# Patient Record
Sex: FEMALE | Race: BLACK OR AFRICAN AMERICAN | NOT HISPANIC OR LATINO | Employment: OTHER | URBAN - METROPOLITAN AREA
[De-identification: names, ages, dates, MRNs, and addresses within clinical notes are randomized per-mention and may not be internally consistent; named-entity substitution may affect disease eponyms.]

---

## 2017-06-12 ENCOUNTER — ALLSCRIPTS OFFICE VISIT (OUTPATIENT)
Dept: OTHER | Facility: OTHER | Age: 62
End: 2017-06-12

## 2017-08-21 ENCOUNTER — GENERIC CONVERSION - ENCOUNTER (OUTPATIENT)
Dept: OTHER | Facility: OTHER | Age: 62
End: 2017-08-21

## 2018-06-18 ENCOUNTER — OFFICE VISIT (OUTPATIENT)
Dept: DERMATOLOGY | Facility: CLINIC | Age: 63
End: 2018-06-18
Payer: COMMERCIAL

## 2018-06-18 DIAGNOSIS — L20.84 INTRINSIC ATOPIC DERMATITIS: Primary | ICD-10-CM

## 2018-06-18 DIAGNOSIS — Z13.89 SCREENING FOR SKIN CONDITION: ICD-10-CM

## 2018-06-18 PROCEDURE — 99213 OFFICE O/P EST LOW 20 MIN: CPT | Performed by: DERMATOLOGY

## 2018-06-18 RX ORDER — CLOBETASOL PROPIONATE 0.5 MG/G
OINTMENT TOPICAL
COMMUNITY
Start: 2015-06-03 | End: 2018-06-18 | Stop reason: SDUPTHER

## 2018-06-18 RX ORDER — CLOBETASOL PROPIONATE 0.5 MG/G
OINTMENT TOPICAL 2 TIMES DAILY
Qty: 30 G | Refills: 2 | Status: SHIPPED | OUTPATIENT
Start: 2018-06-18 | End: 2019-01-27

## 2018-06-18 NOTE — PROGRESS NOTES
[address] [facility] [address] [facility] DERMATOLOGY  [address] [address]     MRN: [id number] : 1955  Encounter: [id number]  Patient Information: Rubbie Craze  Chief complaint:Yearly checkup    History of present illness:  16-year-old female with history of eczema presents for overall checkup concerned regarding some itchy spots on her legs also between her feet she has been using clobetasol I had given her previously  No past medical history on file  No past surgical history on file  Social History   History   Alcohol use Not on file     History   Drug use: Unknown     History   Smoking Status    Not on file   Smokeless Tobacco    Not on file     No family history on file  Meds/Allergies   No Known Allergies    Meds:  Prior to Admission medications    Medication Sig Start Date End Date Taking?  Authorizing Provider   clobetasol (TEMOVATE) 0 05 % ointment Apply topically 6/3/15  Yes Historical Provider, MD   glucose blood test strip TEST 1-2 TIMES DAILY 17  Yes Historical Provider, MD   ONE TOUCH ULTRA TEST test strip TEST 1-2 TIMES DAILY 18  Yes Historical Provider, MD       Subjective:     Review of Systems:    General: negative for - chills, fatigue, fever,  weight gain or weight loss  Psychological: negative for - anxiety, behavioral disorder, concentration difficulties, decreased libido, depression, irritability, memory difficulties, mood swings, sleep disturbances or suicidal ideation  ENT: negative for - hearing difficulties , nasal congestion, nasal discharge, oral lesions, sinus pain, sneezing, sore throat  Allergy and Immunology: negative for - hives, insect bite sensitivity,  Hematological and Lymphatic: negative for - bleeding problems, blood clots,bruising, swollen lymph nodes  Endocrine: negative for - hair pattern changes, hot flashes, malaise/lethargy, mood swings, palpitations, polydipsia/polyuria, skin changes, temperature intolerance or unexpected weight change  Respiratory: negative for - cough, hemoptysis, orthopnea, shortness of breath, or wheezing  Cardiovascular: negative for - chest pain, dyspnea on exertion, edema,  Gastrointestinal: negative for - abdominal pain, nausea/vomiting  Genito-Urinary: negative for - dysuria, incontinence, irregular/heavy menses or urinary frequency/urgency  Musculoskeletal: negative for - gait disturbance, joint pain, joint stiffness, joint swelling, muscle pain, muscular weakness  Dermatological:  As in HPI  Neurological: negative for confusion, dizziness, headaches, impaired coordination/balance, memory loss, numbness/tingling, seizures, speech problems, tremors or weakness       Objective: There were no vitals taken for this visit  Physical Exam:    General Appearance:    Alert, cooperative, no distress   Head:    Normocephalic, without obvious abnormality, atraumatic           Skin:   A full skin exam was performed including scalp, head scalp, eyes, ears, nose, lips, neck, chest, axilla, abdomen, back, buttocks, bilateral upper extremities, bilateral lower extremities, hands, feet, fingers, toes, fingernails, and toenails  Mild hyperpigmentation noted between the toes of the 4th and 5th web  Postinflammatory hyperpigmentation noted on the lower legs no signs of any active rash at this time     Assessment:     1  Intrinsic atopic dermatitis     2   Screening for skin condition           Plan:    eczema again slightly active continue with the topical steroid as needed nothing else of concern noted on complete exam follow-up in a year or so as necessary    Lady Mae MD  6/18/2018,1:17 PM    Portions of the record may have been created with voice recognition software   Occasional wrong word or "sound a like" substitutions may have occurred due to the inherent limitations of voice recognition software   Read the chart carefully and recognize, using context, where substitutions have occurred

## 2018-06-18 NOTE — PATIENT INSTRUCTIONS
eczema again slightly active continue with the topical steroid as needed nothing else of concern noted on complete exam follow-up in a year or so as necessary

## 2019-01-27 ENCOUNTER — APPOINTMENT (EMERGENCY)
Dept: RADIOLOGY | Facility: HOSPITAL | Age: 64
End: 2019-01-27
Payer: COMMERCIAL

## 2019-01-27 ENCOUNTER — HOSPITAL ENCOUNTER (EMERGENCY)
Facility: HOSPITAL | Age: 64
Discharge: HOME | End: 2019-01-27
Attending: EMERGENCY MEDICINE | Admitting: EMERGENCY MEDICINE
Payer: COMMERCIAL

## 2019-01-27 ENCOUNTER — APPOINTMENT (EMERGENCY)
Dept: ULTRASOUND IMAGING | Facility: HOSPITAL | Age: 64
End: 2019-01-27
Payer: COMMERCIAL

## 2019-01-27 VITALS
WEIGHT: 152.12 LBS | RESPIRATION RATE: 18 BRPM | SYSTOLIC BLOOD PRESSURE: 122 MMHG | OXYGEN SATURATION: 98 % | TEMPERATURE: 99 F | HEART RATE: 64 BPM | DIASTOLIC BLOOD PRESSURE: 66 MMHG

## 2019-01-27 DIAGNOSIS — M70.40 PREPATELLAR BURSITIS: Primary | ICD-10-CM

## 2019-01-27 LAB
ANION GAP SERPL CALCULATED.3IONS-SCNC: 13 MMOL/L (ref 4–13)
APTT PPP: 29 SECONDS (ref 26–38)
BASOPHILS # BLD AUTO: 0.04 THOUSANDS/ΜL (ref 0–0.1)
BASOPHILS NFR BLD AUTO: 1 % (ref 0–1)
BUN SERPL-MCNC: 15 MG/DL (ref 5–25)
CALCIUM SERPL-MCNC: 9.2 MG/DL (ref 8.3–10.1)
CHLORIDE SERPL-SCNC: 103 MMOL/L (ref 100–108)
CO2 SERPL-SCNC: 25 MMOL/L (ref 21–32)
CREAT SERPL-MCNC: 0.97 MG/DL (ref 0.6–1.3)
EOSINOPHIL # BLD AUTO: 0.1 THOUSAND/ΜL (ref 0–0.61)
EOSINOPHIL NFR BLD AUTO: 2 % (ref 0–6)
ERYTHROCYTE [DISTWIDTH] IN BLOOD BY AUTOMATED COUNT: 13.5 % (ref 11.6–15.1)
GFR SERPL CREATININE-BSD FRML MDRD: 73 ML/MIN/1.73SQ M
GLUCOSE SERPL-MCNC: 119 MG/DL (ref 65–140)
HCT VFR BLD AUTO: 41.6 % (ref 34.8–46.1)
HGB BLD-MCNC: 14.1 G/DL (ref 11.5–15.4)
IMM GRANULOCYTES # BLD AUTO: 0.01 THOUSAND/UL (ref 0–0.2)
IMM GRANULOCYTES NFR BLD AUTO: 0 % (ref 0–2)
INR PPP: 0.94 (ref 0.86–1.17)
LYMPHOCYTES # BLD AUTO: 1.97 THOUSANDS/ΜL (ref 0.6–4.47)
LYMPHOCYTES NFR BLD AUTO: 29 % (ref 14–44)
MCH RBC QN AUTO: 27.7 PG (ref 26.8–34.3)
MCHC RBC AUTO-ENTMCNC: 33.9 G/DL (ref 31.4–37.4)
MCV RBC AUTO: 82 FL (ref 82–98)
MONOCYTES # BLD AUTO: 0.48 THOUSAND/ΜL (ref 0.17–1.22)
MONOCYTES NFR BLD AUTO: 7 % (ref 4–12)
NEUTROPHILS # BLD AUTO: 4.13 THOUSANDS/ΜL (ref 1.85–7.62)
NEUTS SEG NFR BLD AUTO: 61 % (ref 43–75)
NRBC BLD AUTO-RTO: 0 /100 WBCS
PLATELET # BLD AUTO: 277 THOUSANDS/UL (ref 149–390)
PMV BLD AUTO: 9.5 FL (ref 8.9–12.7)
POTASSIUM SERPL-SCNC: 3.7 MMOL/L (ref 3.5–5.3)
PROTHROMBIN TIME: 12.5 SECONDS (ref 11.8–14.2)
RBC # BLD AUTO: 5.09 MILLION/UL (ref 3.81–5.12)
SODIUM SERPL-SCNC: 141 MMOL/L (ref 136–145)
URATE SERPL-MCNC: 5.5 MG/DL (ref 2–6.8)
WBC # BLD AUTO: 6.73 THOUSAND/UL (ref 4.31–10.16)

## 2019-01-27 PROCEDURE — 80048 BASIC METABOLIC PNL TOTAL CA: CPT | Performed by: PHYSICIAN ASSISTANT

## 2019-01-27 PROCEDURE — 85610 PROTHROMBIN TIME: CPT | Performed by: PHYSICIAN ASSISTANT

## 2019-01-27 PROCEDURE — 73562 X-RAY EXAM OF KNEE 3: CPT

## 2019-01-27 PROCEDURE — 36415 COLL VENOUS BLD VENIPUNCTURE: CPT | Performed by: PHYSICIAN ASSISTANT

## 2019-01-27 PROCEDURE — 99284 EMERGENCY DEPT VISIT MOD MDM: CPT

## 2019-01-27 PROCEDURE — 85730 THROMBOPLASTIN TIME PARTIAL: CPT | Performed by: PHYSICIAN ASSISTANT

## 2019-01-27 PROCEDURE — 84550 ASSAY OF BLOOD/URIC ACID: CPT | Performed by: PHYSICIAN ASSISTANT

## 2019-01-27 PROCEDURE — 85025 COMPLETE CBC W/AUTO DIFF WBC: CPT | Performed by: PHYSICIAN ASSISTANT

## 2019-01-27 PROCEDURE — 93971 EXTREMITY STUDY: CPT | Performed by: SURGERY

## 2019-01-27 PROCEDURE — 93971 EXTREMITY STUDY: CPT

## 2019-01-27 RX ORDER — NAPROXEN 500 MG/1
500 TABLET ORAL 2 TIMES DAILY PRN
Qty: 30 TABLET | Refills: 0 | Status: SHIPPED | OUTPATIENT
Start: 2019-01-27 | End: 2021-03-30 | Stop reason: ALTCHOICE

## 2019-01-27 RX ORDER — TRAMADOL HYDROCHLORIDE 50 MG/1
50 TABLET ORAL EVERY 6 HOURS PRN
Qty: 15 TABLET | Refills: 0 | Status: SHIPPED | OUTPATIENT
Start: 2019-01-27 | End: 2019-02-06

## 2019-01-27 RX ORDER — CLINDAMYCIN HYDROCHLORIDE 300 MG/1
300 CAPSULE ORAL 3 TIMES DAILY
Qty: 21 CAPSULE | Refills: 0 | Status: SHIPPED | OUTPATIENT
Start: 2019-01-27 | End: 2019-02-03

## 2019-01-27 RX ORDER — ACETAMINOPHEN 325 MG/1
650 TABLET ORAL ONCE
Status: COMPLETED | OUTPATIENT
Start: 2019-01-27 | End: 2019-01-27

## 2019-01-27 RX ADMIN — ACETAMINOPHEN 650 MG: 325 TABLET, FILM COATED ORAL at 07:12
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
## 2019-01-27 NOTE — DISCHARGE INSTRUCTIONS
Knee Bursitis   WHAT YOU NEED TO KNOW:   Knee bursitis is inflammation of the bursa in your knee  The bursa is a fluid-filled sac that acts as a cushion between a bone and a tendon  A tendon is a cord of strong tissue that connects muscles to bones  DISCHARGE INSTRUCTIONS:   Medicines:   · NSAIDs:  These medicines decrease swelling, pain, and fever  NSAIDs are available without a doctor's order  Ask your healthcare provider which medicine is right for you  Ask how much to take and when to take it  Take as directed  NSAIDs can cause stomach bleeding and kidney problems if not taken correctly  · Antibiotics: These help fight an infection caused by bacteria  You may need antibiotics if your bursitis is caused by infection  · Take your medicine as directed  Contact your healthcare provider if you think your medicine is not helping or if you have side effects  Tell him of her if you are allergic to any medicine  Keep a list of the medicines, vitamins, and herbs you take  Include the amounts, and when and why you take them  Bring the list or the pill bottles to follow-up visits  Carry your medicine list with you in case of an emergency  Manage your symptoms:   · Rest:  Rest your knee as much as possible to decrease pain and swelling  Slowly start to do more each day  Return to your daily activities as directed  · Ice:  Ice helps decrease swelling and pain  Ice may also help prevent tissue damage  Use an ice pack, or put crushed ice in a plastic bag  Cover it with a towel and place it on your knee for 15 to 20 minutes, 3 to 4 times each day, as directed  · Heat:  Heat helps decrease pain and stiffness  Apply heat on the area for 15 to 20 minutes, 3 to 4 times each day, as directed  · Compression:  Healthcare providers may wrap your knee with tape or an elastic bandage to decrease swelling  Loosen the elastic bandage if you start to lose feeling in your toes      · Elevation:  Raise your knee above the level of your heart as often as you can  This will help decrease swelling and pain  Prop your knee on pillows or blankets to keep it elevated comfortably  Physical therapy:  A physical therapist teaches you exercises to help improve movement and strength, and to decrease pain  Prevent another knee injury:   · Stretch, warm up, and cool down:  Always stretch and do warmup and cool-down exercises before and after you exercise  This will help loosen your muscles and decrease stress on your knees  Rest between workouts  · Protect your knees:  Use kneepads when you kneel on a hard surface and when you play sports  Stand and walk around every 20 minutes if you have to kneel for a long period of time  Follow up with your healthcare provider as directed:  Write down your questions so you remember to ask them during your visits  Contact your healthcare provider if:   · Your pain and swelling increase  · Your symptoms do not improve with treatment  · You have a fever  · You have questions or concerns about your condition or care  © 2017 [facility] Information is for End User's use only and may not be sold, redistributed or otherwise used for commercial purposes  All illustrations and images included in CareNotes® are the copyrighted property of A D [employer]  or [employer]  The above information is an  only  It is not intended as medical advice for individual conditions or treatments  Talk to your doctor, nurse or pharmacist before following any medical regimen to see if it is safe and effective for you  Knee Immobilizer   WHAT YOU NEED TO KNOW:   A knee immobilizer limits knee movement  It is used after an injury or surgery to help your knee, muscles, or tendons heal    DISCHARGE INSTRUCTIONS:   How to safely use a knee immobilizer:   · Have your knee immobilizer fitted by your healthcare provider    It is important that your knee immobilizer is the right size for you and that it fits properly  · Wear your knee immobilizer as directed  It can be worn over your clothing  Check the fit of the knee immobilizer often  If it does not fit properly or slips out of place, it could cause further injury  · Use crutches as directed  You may need to avoid putting weight on your injured leg  Your healthcare provider will tell you if you need crutches and for how long  · Inspect your knee immobilizer often  Do not wear your knee immobilizer if it is damaged or broken  You may need to replace it if it becomes worn  · Ask your healthcare provider how to care for your knee immobilizer  You may be able to hand wash the fabric with mild soap and water  Do not place it in the washer or dryer  · Go to physical therapy as directed  A physical therapist can help you strengthen the muscles in your leg and help your knee heal   Contact your healthcare provider if:   · Your knee pain becomes worse when you wear your knee immobilizer  · Your skin is sore or raw after you wear your knee immobilizer  · Your leg feels numb or swells while you wear your knee immobilizer  · Your knee immobilizer is damaged  · You have questions or concerns about your condition or care  Return to the emergency department if:   · You have severe swelling or pain in your leg or knee  © 2017 [facility] Information is for End User's use only and may not be sold, redistributed or otherwise used for commercial purposes  All illustrations and images included in CareNotes® are the copyrighted property of [employer]  or [employer]  The above information is an  only  It is not intended as medical advice for individual conditions or treatments  Talk to your doctor, nurse or pharmacist before following any medical regimen to see if it is safe and effective for you        RICE Therapy   WHAT YOU NEED TO KNOW:   RICE therapy is a 4-step process used to reduce swelling and pain from an injury  RICE stands for rest, ice, compression, and elevation  RICE should be done within the first 24 to 48 hours after an injury  DISCHARGE INSTRUCTIONS:   Follow up with your healthcare provider as directed:  Write down your questions so you remember to ask them during your visits  How to use RICE therapy:   · Rest  the injured area so that it can heal  You may need to avoid putting any weight on your injury for at least 48 hours  Return to normal activities as directed  · Ice  the injury for 20 minutes every 4 hours, or as directed  Use an ice pack, or put crushed ice in a plastic bag  Cover it with a towel to protect your skin  Ice helps prevent tissue damage and decreases swelling and pain  · Compress  the injury with an elastic bandage, air cast, special boot, or splint to reduce swelling  Ask your healthcare provider which compression device to use, and how tight it should be  · Elevate  the injured area above the level of your heart as often as you can  This will help decrease swelling and pain  If possible, prop the injured area on pillows or blankets to keep it elevated comfortably  Contact your healthcare provider if:   · Your pain does not decrease, even after treatment  · You have questions or concerns about your condition or care  Return to the emergency department if:   · You have severe pain in the injured area  · You have numbness in the injured area  · You cannot put any weight on or move the injured area  © 2017 [address] [employer]  Information is for End User's use only and may not be sold, redistributed or otherwise used for commercial purposes  All illustrations and images included in CareNotes® are the copyrighted property of [employer]  or [employer]  The above information is an  only  It is not intended as medical advice for individual conditions or treatments   Talk to your doctor, nurse or pharmacist before following any medical regimen to see if it is safe and effective for you

## 2019-06-24 ENCOUNTER — OFFICE VISIT (OUTPATIENT)
Dept: DERMATOLOGY | Facility: CLINIC | Age: 64
End: 2019-06-24
Payer: COMMERCIAL

## 2019-06-24 DIAGNOSIS — Z13.89 SCREENING FOR SKIN CONDITION: ICD-10-CM

## 2019-06-24 DIAGNOSIS — L20.84 INTRINSIC ATOPIC DERMATITIS: Primary | ICD-10-CM

## 2019-06-24 PROCEDURE — 99213 OFFICE O/P EST LOW 20 MIN: CPT | Performed by: DERMATOLOGY

## 2019-06-24 RX ORDER — GABAPENTIN 100 MG/1
100 CAPSULE ORAL AS NEEDED
COMMUNITY

## 2019-06-24 RX ORDER — BETAMETHASONE DIPROPIONATE 0.5 MG/G
OINTMENT TOPICAL 2 TIMES DAILY
Qty: 15 G | Refills: 2 | Status: SHIPPED | OUTPATIENT
Start: 2019-06-24 | End: 2021-03-30 | Stop reason: ALTCHOICE

## 2020-06-26 ENCOUNTER — TELEPHONE (OUTPATIENT)
Dept: DERMATOLOGY | Facility: CLINIC | Age: 65
End: 2020-06-26

## 2020-06-29 ENCOUNTER — OFFICE VISIT (OUTPATIENT)
Dept: DERMATOLOGY | Facility: CLINIC | Age: 65
End: 2020-06-29
Payer: COMMERCIAL

## 2020-06-29 VITALS — DIASTOLIC BLOOD PRESSURE: 70 MMHG | SYSTOLIC BLOOD PRESSURE: 122 MMHG | TEMPERATURE: 98 F

## 2020-06-29 DIAGNOSIS — L28.0 LICHEN SIMPLEX CHRONICUS: Primary | ICD-10-CM

## 2020-06-29 DIAGNOSIS — L65.9 ALOPECIA: ICD-10-CM

## 2020-06-29 PROCEDURE — 99213 OFFICE O/P EST LOW 20 MIN: CPT | Performed by: DERMATOLOGY

## 2020-06-29 RX ORDER — CLOBETASOL PROPIONATE 0.5 MG/G
OINTMENT TOPICAL
COMMUNITY
End: 2020-06-29 | Stop reason: SDUPTHER

## 2020-06-29 RX ORDER — CLOBETASOL PROPIONATE 0.5 MG/G
OINTMENT TOPICAL 2 TIMES DAILY
Qty: 30 G | Refills: 1 | Status: SHIPPED | OUTPATIENT
Start: 2020-06-29 | End: 2021-03-30 | Stop reason: ALTCHOICE

## 2020-09-05 ENCOUNTER — APPOINTMENT (OUTPATIENT)
Dept: RADIOLOGY | Facility: CLINIC | Age: 65
End: 2020-09-05
Payer: COMMERCIAL

## 2020-09-05 ENCOUNTER — OFFICE VISIT (OUTPATIENT)
Dept: URGENT CARE | Facility: CLINIC | Age: 65
End: 2020-09-05
Payer: COMMERCIAL

## 2020-09-05 VITALS
OXYGEN SATURATION: 100 % | DIASTOLIC BLOOD PRESSURE: 88 MMHG | HEIGHT: 67 IN | SYSTOLIC BLOOD PRESSURE: 138 MMHG | RESPIRATION RATE: 18 BRPM | BODY MASS INDEX: 23.23 KG/M2 | TEMPERATURE: 97.8 F | HEART RATE: 80 BPM | WEIGHT: 148 LBS

## 2020-09-05 DIAGNOSIS — M75.32 CALCIFIC TENDINITIS OF LEFT SHOULDER: Primary | ICD-10-CM

## 2020-09-05 DIAGNOSIS — M25.512 ACUTE PAIN OF LEFT SHOULDER: ICD-10-CM

## 2020-09-05 PROCEDURE — 99213 OFFICE O/P EST LOW 20 MIN: CPT | Performed by: PHYSICIAN ASSISTANT

## 2020-09-05 PROCEDURE — 73030 X-RAY EXAM OF SHOULDER: CPT

## 2020-09-05 RX ORDER — ACETAMINOPHEN AND CODEINE PHOSPHATE 300; 30 MG/1; MG/1
1 TABLET ORAL EVERY 6 HOURS PRN
Qty: 12 TABLET | Refills: 0 | Status: SHIPPED | OUTPATIENT
Start: 2020-09-05 | End: 2021-03-30 | Stop reason: ALTCHOICE

## 2020-09-05 RX ORDER — PREDNISONE 10 MG/1
TABLET ORAL
Qty: 18 TABLET | Refills: 0 | Status: SHIPPED | OUTPATIENT
Start: 2020-09-05 | End: 2021-03-30 | Stop reason: ALTCHOICE

## 2020-09-05 NOTE — PROGRESS NOTES
[facility]        NAME: Ramila Horowitz is a 63 y o  female  : 1955    MRN: [id number]  DATE: 2020  TIME: 3:12 PM    Assessment and Plan   Calcific tendinitis of left shoulder [M75 32]  1  Calcific tendinitis of left shoulder  XR shoulder 2+ vw left    predniSONE 10 mg tablet    Ambulatory referral to Orthopedic Surgery    acetaminophen-codeine (TYLENOL #3) 300-30 mg per tablet         Patient Instructions   Patient Instructions     X-ray shows calcific tendinitis  We will start her on steroid taper  Monitor sugars of on the steroid  Will benefit from orthopedics evaluation  Follow up with PCP in 3-5 days  Proceed to  ER if symptoms worsen  Chief Complaint     Chief Complaint   Patient presents with    Shoulder Pain     Pt c/o left shoulder pain x 4 days, pt states she woke up with it on Tuesday no injury  History of Present Illness         31-year-old female complains of left shoulder pain starting   Tuesday  She slept on it  It has gotten worse  She woke up with it  No fall or trauma  Very painful now she cannot move the arm in all without pain  No numbness or tingling in the arm  No treatment at home  Review of Systems   Review of Systems   Constitutional: Negative for chills, fatigue and fever  HENT: Negative for congestion, ear pain, postnasal drip, rhinorrhea, sinus pressure, sinus pain, sore throat and trouble swallowing  Eyes: Negative for visual disturbance  Respiratory: Negative for chest tightness and shortness of breath  Cardiovascular: Negative for chest pain and palpitations  Gastrointestinal: Negative for diarrhea, nausea and vomiting  Musculoskeletal: Positive for arthralgias  Neurological: Negative for dizziness           Current Medications       Current Outpatient Medications:     clobetasol (TEMOVATE) 0 05 % ointment, Apply topically 2 (two) times a day, Disp: 30 g, Rfl: 1    gabapentin (NEURONTIN) 100 mg capsule, Take 100 mg by mouth as needed , Disp: , Rfl:     metFORMIN (GLUCOPHAGE) 500 mg tablet, Take 500 mg by mouth 2 (two) times a day with meals, Disp: , Rfl:     ONE TOUCH ULTRA TEST test strip, TEST 1-2 TIMES DAILY, Disp: , Rfl: 5    acetaminophen-codeine (TYLENOL #3) 300-30 mg per tablet, Take 1 tablet by mouth every 6 (six) hours as needed for moderate pain, Disp: 12 tablet, Rfl: 0    betamethasone, augmented, (DIPROLENE) 0 05 % ointment, Apply topically 2 (two) times a day to rash on legs till resolved (Patient not taking: Reported on 6/29/2020), Disp: 15 g, Rfl: 2    naproxen (NAPROSYN) 500 mg tablet, Take 1 tablet (500 mg total) by mouth 2 (two) times a day as needed for mild pain (knee pain/initial rx ) (Patient not taking: Reported on 6/24/2019), Disp: 30 tablet, Rfl: 0    predniSONE 10 mg tablet, 3 tabs daily for 3 days, 2 tabs daily for 3 days, 1 tab daily for 3 days, Disp: 18 tablet, Rfl: 0    Current Allergies     Allergies as of 09/05/2020    (No Known Allergies)            The following portions of the patient's history were reviewed and updated as appropriate: allergies, current medications, past family history, past medical history, past social history, past surgical history and problem list      Past Medical History:   Diagnosis Date    Diabetes mellitus ([facility] [address] )     [place] teeth extracted 08/14/2020       History reviewed  No pertinent surgical history  No family history on file  Medications have been verified  Objective   /88   Pulse 80   Temp 97 8 °F (36 6 °C) (Tympanic)   Resp 18   Ht 5' 7" (1 702 m)   Wt 67 1 kg (148 lb)   SpO2 100%   BMI 23 18 kg/m²        Physical Exam     Physical Exam  Constitutional:       General: She is not in acute distress  Appearance: She is well-developed  Musculoskeletal:      Comments:   Holding left arm at her side  Left shoulder tender over the [facility] joint and over the anterior and posterior shoulder    No active range of motion due to pain  She is able to passively flex to 70 but this is very painful  Pain with drop-arm  Painful abduction  Pain with internal rotation  Unable to assess impingement due to pain and guarding  Neurological:      Mental Status: She is alert and oriented to person, place, and time

## 2020-09-05 NOTE — PATIENT INSTRUCTIONS
X-ray shows calcific tendinitis  We will start her on steroid taper  Monitor sugars of on the steroid  Will benefit from orthopedics evaluation

## 2021-03-26 ENCOUNTER — TELEPHONE (OUTPATIENT)
Dept: FAMILY MEDICINE CLINIC | Facility: CLINIC | Age: 66
End: 2021-03-26

## 2021-03-29 NOTE — TELEPHONE ENCOUNTER
Woke up with blood clot in mouth 3/23 and 3/25  Took laxative at same time  She is feeling better now  Would like to see Dr Vitaly Wright or Jagdish Rosenthal to check up on what may be going on

## 2021-03-30 ENCOUNTER — OFFICE VISIT (OUTPATIENT)
Dept: FAMILY MEDICINE CLINIC | Facility: CLINIC | Age: 66
End: 2021-03-30
Payer: COMMERCIAL

## 2021-03-30 VITALS
TEMPERATURE: 97.2 F | SYSTOLIC BLOOD PRESSURE: 138 MMHG | HEIGHT: 67 IN | WEIGHT: 151.6 LBS | BODY MASS INDEX: 23.79 KG/M2 | RESPIRATION RATE: 16 BRPM | HEART RATE: 72 BPM | DIASTOLIC BLOOD PRESSURE: 66 MMHG | OXYGEN SATURATION: 97 %

## 2021-03-30 DIAGNOSIS — L65.9 HAIR LOSS: ICD-10-CM

## 2021-03-30 DIAGNOSIS — R04.2 BLOODY SPUTUM: ICD-10-CM

## 2021-03-30 DIAGNOSIS — E11.69 TYPE 2 DIABETES MELLITUS WITH OTHER SPECIFIED COMPLICATION, WITHOUT LONG-TERM CURRENT USE OF INSULIN (HCC): ICD-10-CM

## 2021-03-30 DIAGNOSIS — Z11.59 NEED FOR HEPATITIS C SCREENING TEST: ICD-10-CM

## 2021-03-30 DIAGNOSIS — Z00.01 ENCOUNTER FOR GENERAL ADULT MEDICAL EXAMINATION WITH ABNORMAL FINDINGS: ICD-10-CM

## 2021-03-30 DIAGNOSIS — J01.11 ACUTE RECURRENT FRONTAL SINUSITIS: ICD-10-CM

## 2021-03-30 DIAGNOSIS — D22.9 ATYPICAL MOLE: Primary | ICD-10-CM

## 2021-03-30 DIAGNOSIS — E78.2 MIXED HYPERLIPIDEMIA: ICD-10-CM

## 2021-03-30 PROBLEM — R53.82 CHRONIC FATIGUE: Status: ACTIVE | Noted: 2017-11-17

## 2021-03-30 PROBLEM — R87.810 CERVICAL HIGH RISK HPV (HUMAN PAPILLOMAVIRUS) TEST POSITIVE: Status: ACTIVE | Noted: 2017-11-17

## 2021-03-30 PROBLEM — E11.9 TYPE 2 DIABETES MELLITUS WITHOUT COMPLICATION, WITHOUT LONG-TERM CURRENT USE OF INSULIN (HCC): Status: ACTIVE | Noted: 2017-11-17

## 2021-03-30 PROBLEM — E78.5 DYSLIPIDEMIA: Status: ACTIVE | Noted: 2020-03-02

## 2021-03-30 PROBLEM — Z87.891 FORMER SMOKER: Status: ACTIVE | Noted: 2020-03-02

## 2021-03-30 PROBLEM — I44.0 FIRST DEGREE ATRIOVENTRICULAR BLOCK BY ELECTROCARDIOGRAM: Status: ACTIVE | Noted: 2017-11-17

## 2021-03-30 PROBLEM — E55.9 VITAMIN D DEFICIENCY: Status: ACTIVE | Noted: 2017-11-17

## 2021-03-30 PROBLEM — G62.9 NEUROPATHY: Status: ACTIVE | Noted: 2020-03-02

## 2021-03-30 PROCEDURE — 99396 PREV VISIT EST AGE 40-64: CPT | Performed by: NURSE PRACTITIONER

## 2021-03-30 RX ORDER — KETOCONAZOLE 20 MG/ML
1 SHAMPOO TOPICAL WEEKLY
Qty: 240 ML | Refills: 1 | Status: SHIPPED | OUTPATIENT
Start: 2021-03-30 | End: 2021-05-25

## 2021-03-30 RX ORDER — EZETIMIBE 10 MG/1
TABLET ORAL
COMMUNITY
End: 2021-03-30 | Stop reason: ALTCHOICE

## 2021-03-30 RX ORDER — OXYCODONE AND ACETAMINOPHEN 7.5; 325 MG/1; MG/1
TABLET ORAL
COMMUNITY
End: 2021-03-30 | Stop reason: ALTCHOICE

## 2021-03-30 RX ORDER — AMOXICILLIN 875 MG/1
875 TABLET, COATED ORAL 2 TIMES DAILY
Qty: 14 TABLET | Refills: 0 | Status: SHIPPED | OUTPATIENT
Start: 2021-03-30 | End: 2021-04-06

## 2021-03-30 RX ORDER — PRAVASTATIN SODIUM 40 MG
40 TABLET ORAL
COMMUNITY
Start: 2021-03-02 | End: 2022-03-02

## 2021-03-30 RX ORDER — AZITHROMYCIN 250 MG/1
TABLET, FILM COATED ORAL
COMMUNITY
End: 2021-03-30 | Stop reason: ALTCHOICE

## 2021-03-30 RX ORDER — NAPROXEN SODIUM 550 MG/1
TABLET ORAL
COMMUNITY
End: 2021-03-30 | Stop reason: ALTCHOICE

## 2021-03-30 RX ORDER — GLUCOSAMINE HCL/CHONDROITIN SU 500-400 MG
CAPSULE ORAL
COMMUNITY
Start: 2021-03-02

## 2021-03-30 RX ORDER — CYCLOSPORINE 0.5 MG/ML
EMULSION OPHTHALMIC
COMMUNITY
End: 2021-10-15

## 2021-03-30 RX ORDER — LANCETS 30 GAUGE
EACH MISCELLANEOUS 2 TIMES DAILY
COMMUNITY
Start: 2021-03-04

## 2021-03-30 RX ORDER — GLUCOSAMINE HCL/CHONDROITIN SU 500-400 MG
CAPSULE ORAL 2 TIMES DAILY
COMMUNITY
Start: 2021-03-04

## 2021-03-30 RX ORDER — HYDROCODONE BITARTRATE AND ACETAMINOPHEN 5; 325 MG/1; MG/1
TABLET ORAL
COMMUNITY
End: 2021-03-30 | Stop reason: ALTCHOICE

## 2021-03-30 RX ORDER — IBUPROFEN 800 MG/1
TABLET ORAL
COMMUNITY
End: 2021-10-15

## 2021-03-30 RX ORDER — AMOXICILLIN 500 MG/1
CAPSULE ORAL
COMMUNITY
End: 2021-03-30 | Stop reason: ALTCHOICE

## 2021-03-30 NOTE — PROGRESS NOTES
Assessment/Plan:    Follow up Type 2 diabetes - has been eating healthy keeping active - extensive family history of kidney issues   Hyperlipidemia - will needs labs  Has had recurrent sinus infection and some bloody sputum   Has had some hair loss - will discuss and in need to see DERM as she has some atypical hair spots  Will need medicare wellness today          Problem List Items Addressed This Visit     None      Visit Diagnoses     Atypical mole    -  Primary    Relevant Orders    Ambulatory referral to Dermatology    Hair loss        Relevant Medications    ketoconazole (NIZORAL) 2 % shampoo    Bloody sputum        Type 2 diabetes mellitus with other specified complication, without long-term current use of insulin (HCC)        Relevant Orders    Comprehensive metabolic panel    CBC and differential    TSH, 3rd generation with Free T4 reflex    Vitamin D 25 hydroxy    Hemoglobin A1C    Quantiferon TB Gold Plus    XR chest pa & lateral    UA (URINE) with reflex to Scope    Microalbumin / creatinine urine ratio    Lipid panel    Mixed hyperlipidemia        Relevant Orders    Comprehensive metabolic panel    CBC and differential    TSH, 3rd generation with Free T4 reflex    Vitamin D 25 hydroxy    Hemoglobin A1C    Quantiferon TB Gold Plus    XR chest pa & lateral    UA (URINE) with reflex to Scope    Microalbumin / creatinine urine ratio    Lipid panel    Acute recurrent frontal sinusitis        Relevant Medications    amoxicillin (AMOXIL) 875 mg tablet    sodium chloride (OCEAN) 0 65 % nasal spray    Need for hepatitis C screening test        Relevant Orders    Hepatitis C antibody    Encounter for general adult medical examination with abnormal findings                Subjective:      Patient ID: Ivett Allen is a 63 y o  female      Follow up Type 2 diabetes - has been eating healthy keeping active - extensive family history of kidney issues   Hyperlipidemia - will needs labs  Has had recurrent sinus infection and some bloody sputum   Has had some hair loss - will discuss and in need to see DERM as she has some atypical hair spots  Will need medicare wellness today                The following portions of the patient's history were reviewed and updated as appropriate:   Past Medical History:  She has a past medical history of Diabetes mellitus ([address] ) and [place] teeth extracted (08/14/2020)  ,  _______________________________________________________________________  Medical Problems:  does not have any pertinent problems on file ,  _______________________________________________________________________  Past Surgical History:   has no past surgical history on file ,  _______________________________________________________________________  Family History:  family history is not on file ,  _______________________________________________________________________  Social History:   reports that she has quit smoking  She has never used smokeless tobacco  She reports that she does not drink alcohol or use drugs  ,  _______________________________________________________________________  Allergies:  has No Known Allergies     _______________________________________________________________________  Current Outpatient Medications   Medication Sig Dispense Refill    Ergocalciferol (VITAMIN D2 PO) 50,000 Units      gabapentin (NEURONTIN) 100 mg capsule Take 100 mg by mouth as needed       Glucose Blood (BLOOD GLUCOSE TEST STRIPS) STRP Check BG BID      Glucose Blood (BLOOD GLUCOSE TEST STRIPS) STRP Use 2 (two) times a day      ibuprofen (MOTRIN) 800 mg tablet ibuprofen 800 mg tablet      Lancets (OneTouch Delica Plus [id number]) MISC Use 2 (two) times a day      ONE TOUCH ULTRA TEST test strip TEST 1-2 TIMES DAILY  5    pravastatin (PRAVACHOL) 40 mg tablet Take 40 mg by mouth      amoxicillin (AMOXIL) 875 mg tablet Take 1 tablet (875 mg total) by mouth 2 (two) times a day for 7 days 14 tablet 0    cycloSPORINE (Restasis) 0 05 % ophthalmic emulsion Restasis 0 05 % eye drops in a dropperette      Dapagliflozin Propanediol 5 MG TABS Take 5 mg by mouth      ketoconazole (NIZORAL) 2 % shampoo Apply 1 application topically once a week Weekly 240 mL 1    sodium chloride (OCEAN) 0 65 % nasal spray 1 spray into each nostril as needed for congestion or rhinitis for up to 10 days 240 mL 1     No current facility-administered medications for this visit       _______________________________________________________________________  Review of Systems   Constitutional: Negative for chills and fatigue  HENT: Positive for congestion, postnasal drip and sore throat  Intermittent blood tinged sputum    Eyes: Negative  Respiratory: Negative for cough and shortness of breath  Cardiovascular: Negative for chest pain, palpitations and leg swelling  Gastrointestinal: Negative for abdominal distention, abdominal pain and nausea  Endocrine:        Type 2 diabetes    Genitourinary: Negative for difficulty urinating and flank pain  Family history of kidney issues    Skin: Negative  Positive for hair loss    Allergic/Immunologic: Positive for environmental allergies  Neurological: Negative for dizziness and headaches  Psychiatric/Behavioral: Negative for sleep disturbance and suicidal ideas  The patient is nervous/anxious  Objective:  Vitals:    03/30/21 1647   BP: 138/66   BP Location: Left arm   Patient Position: Sitting   Cuff Size: Standard   Pulse: 72   Resp: 16   Temp: (!) 97 2 °F (36 2 °C)   TempSrc: Temporal   SpO2: 97%   Weight: 68 8 kg (151 lb 9 6 oz)   Height: 5' 7" (1 702 m)     Body mass index is 23 74 kg/m²  Physical Exam  Vitals signs and nursing note reviewed  Constitutional:       Appearance: Normal appearance  HENT:      Head: Atraumatic  Mouth/Throat:      Mouth: Mucous membranes are dry  Pharynx: Oropharyngeal exudate and posterior oropharyngeal erythema present  Eyes:      Extraocular Movements: Extraocular movements intact  Neck:      Musculoskeletal: Normal range of motion  Cardiovascular:      Rate and Rhythm: Normal rate and regular rhythm  Pulses: Normal pulses  Heart sounds: Normal heart sounds  Pulmonary:      Effort: Pulmonary effort is normal       Breath sounds: Normal breath sounds  Abdominal:      Palpations: Abdomen is soft  Musculoskeletal: Normal range of motion  Skin:     General: Skin is warm  Neurological:      Mental Status: She is alert and oriented to person, place, and time     Psychiatric:         Mood and Affect: Mood normal          Behavior: Behavior normal

## 2021-04-04 NOTE — PATIENT INSTRUCTIONS

## 2021-04-28 ENCOUNTER — OFFICE VISIT (OUTPATIENT)
Dept: FAMILY MEDICINE CLINIC | Facility: CLINIC | Age: 66
End: 2021-04-28
Payer: COMMERCIAL

## 2021-04-28 VITALS
HEIGHT: 67 IN | SYSTOLIC BLOOD PRESSURE: 110 MMHG | HEART RATE: 88 BPM | RESPIRATION RATE: 16 BRPM | TEMPERATURE: 97.3 F | DIASTOLIC BLOOD PRESSURE: 76 MMHG | BODY MASS INDEX: 23.39 KG/M2 | WEIGHT: 149 LBS

## 2021-04-28 DIAGNOSIS — E11.9 TYPE 2 DIABETES MELLITUS WITHOUT COMPLICATION, WITHOUT LONG-TERM CURRENT USE OF INSULIN (HCC): ICD-10-CM

## 2021-04-28 DIAGNOSIS — R53.82 CHRONIC FATIGUE: ICD-10-CM

## 2021-04-28 DIAGNOSIS — J01.11 ACUTE RECURRENT FRONTAL SINUSITIS: Primary | ICD-10-CM

## 2021-04-28 PROCEDURE — 99214 OFFICE O/P EST MOD 30 MIN: CPT | Performed by: NURSE PRACTITIONER

## 2021-04-28 RX ORDER — GUAIFENESIN 600 MG
1200 TABLET, EXTENDED RELEASE 12 HR ORAL EVERY 12 HOURS SCHEDULED
Qty: 20 TABLET | Refills: 1 | Status: SHIPPED | OUTPATIENT
Start: 2021-04-28 | End: 2021-05-04

## 2021-04-28 RX ORDER — AZITHROMYCIN 500 MG/1
500 TABLET, FILM COATED ORAL DAILY
Qty: 3 TABLET | Refills: 0 | Status: SHIPPED | OUTPATIENT
Start: 2021-04-28 | End: 2021-05-01

## 2021-04-28 NOTE — PROGRESS NOTES
BMI Counseling: Body mass index is 23 34 kg/m²  The BMI is above normal  Nutrition recommendations include decreasing portion sizes, encouraging healthy choices of fruits and vegetables, decreasing fast food intake, consuming healthier snacks, limiting drinks that contain sugar, moderation in carbohydrate intake, increasing intake of lean protein, reducing intake of saturated and trans fat and reducing intake of cholesterol  Exercise recommendations include vigorous physical activity 75 minutes/week, exercising 3-5 times per week and strength training exercises  NORMAL BMI       Depression Screening and Follow-up Plan: Clincally patient does not have depression  No treatment is required  Assessment/Plan:  Presents in office for follow up recurrent sinus congestion - increased fatigue  After the last treatments she is feeling better but still feels some pressure would like to follow up with ENT to look into it further  Possibly enlarged thyroid on exam and with increased fatigue will order US and add some additional blood work - follow up in 4-6 weeks to review labs and discuss work up   She is still pending a chest x ray also   Hair loss has gotten better on the shampoo continue use        Problem List Items Addressed This Visit        Other    Chronic fatigue    Relevant Orders    US thyroid      Other Visit Diagnoses     Acute recurrent frontal sinusitis    -  Primary    Relevant Orders    Ambulatory Referral to Otolaryngology            Subjective:      Patient ID: Javier Flynn is a 63 y o  female  Assessment/Plan:  Presents in office for follow up recurrent sinus congestion - increased fatigue  After the last treatments she is feeling better but still feels some pressure would like to follow up with ENT to look into it further     Possibly enlarged thyroid on exam and with increased fatigue will order US and add some additional blood work - follow up in 4-6 weeks to review labs and discuss work up She is still pending a chest x ray also         The following portions of the patient's history were reviewed and updated as appropriate:   Past Medical History:  She has a past medical history of Diabetes mellitus ([address] ), Ear problems, Tinnitus, and Johnson teeth extracted (08/14/2020)  ,  _______________________________________________________________________  Medical Problems:  does not have any pertinent problems on file ,  _______________________________________________________________________  Past Surgical History:   has no past surgical history on file ,  _______________________________________________________________________  Family History:  family history is not on file ,  _______________________________________________________________________  Social History:   reports that she has quit smoking  She has never used smokeless tobacco  She reports that she does not drink alcohol or use drugs  ,  _______________________________________________________________________  Allergies:  has No Known Allergies     _______________________________________________________________________  Current Outpatient Medications   Medication Sig Dispense Refill    cycloSPORINE (Restasis) 0 05 % ophthalmic emulsion Restasis 0 05 % eye drops in a dropperette      Dapagliflozin Propanediol 5 MG TABS Take 5 mg by mouth      Ergocalciferol (VITAMIN D2 PO) 50,000 Units      gabapentin (NEURONTIN) 100 mg capsule Take 100 mg by mouth as needed       Glucose Blood (BLOOD GLUCOSE TEST STRIPS) STRP Check BG BID      Glucose Blood (BLOOD GLUCOSE TEST STRIPS) STRP Use 2 (two) times a day      ibuprofen (MOTRIN) 800 mg tablet ibuprofen 800 mg tablet      ketoconazole (NIZORAL) 2 % shampoo Apply 1 application topically once a week Weekly 240 mL 1    Lancets (OneTouch Delica Plus [id number]) MISC Use 2 (two) times a day      ONE TOUCH ULTRA TEST test strip TEST 1-2 TIMES DAILY  5    pravastatin (PRAVACHOL) 40 mg tablet Take 40 mg by mouth      fluticasone (FLONASE) 50 mcg/act nasal spray 2 sprays into each nostril daily 1 Bottle 5    sodium chloride (OCEAN) 0 65 % nasal spray 1 spray into each nostril as needed for congestion or rhinitis for up to 10 days 240 mL 1     No current facility-administered medications for this visit       _______________________________________________________________________  Review of Systems   Constitutional: Positive for fatigue  Negative for chills  HENT: Negative for congestion, postnasal drip and sore throat  Blood tinges sputum resolved   Feels better not as much pressure however she still feels one side is stuffier   Will discuss follow up with ENT    Eyes: Negative  Respiratory: Negative for cough and shortness of breath  Cardiovascular: Negative for chest pain, palpitations and leg swelling  Gastrointestinal: Negative for abdominal distention, abdominal pain and nausea  Endocrine:        Type 2 diabetes    Genitourinary: Negative for difficulty urinating and flank pain  Family history of kidney issues    Skin: Negative  Positive for hair loss    Allergic/Immunologic: Positive for environmental allergies  Neurological: Negative for dizziness and headaches  Psychiatric/Behavioral: Negative for sleep disturbance and suicidal ideas  The patient is nervous/anxious  Objective:  Vitals:    04/28/21 1106   BP: 110/76   BP Location: Right arm   Patient Position: Sitting   Cuff Size: Adult   Pulse: 88   Resp: 16   Temp: (!) 97 3 °F (36 3 °C)   TempSrc: Temporal   Weight: 67 6 kg (149 lb)   Height: 5' 7" (1 702 m)     Body mass index is 23 34 kg/m²  Physical Exam  Vitals signs and nursing note reviewed  Constitutional:       Appearance: Normal appearance  HENT:      Head: Atraumatic  Nose: Congestion (slight sinus ) present  Mouth/Throat:      Mouth: Mucous membranes are dry  Pharynx: No oropharyngeal exudate or posterior oropharyngeal erythema  Eyes:      Extraocular Movements: Extraocular movements intact  Neck:      Musculoskeletal: Normal range of motion  Cardiovascular:      Rate and Rhythm: Normal rate and regular rhythm  Pulses: Normal pulses  Heart sounds: Normal heart sounds  Pulmonary:      Effort: Pulmonary effort is normal       Breath sounds: Normal breath sounds  Abdominal:      Palpations: Abdomen is soft  Musculoskeletal: Normal range of motion  Skin:     General: Skin is warm  Neurological:      Mental Status: She is alert and oriented to person, place, and time     Psychiatric:         Mood and Affect: Mood normal          Behavior: Behavior normal

## 2021-05-09 NOTE — PATIENT INSTRUCTIONS
Fatigue   WHAT YOU NEED TO KNOW:   Fatigue is mental and physical exhaustion that does not get better with rest  Fatigue may make daily activities difficult or cause extreme sleepiness  It is normal to feel tired sometimes, but long-term fatigue may be a sign of serious illness  DISCHARGE INSTRUCTIONS:   Return to the emergency department if:   · You have chest pain  · You have difficulty breathing  Contact your healthcare provider if:   · You have a cough that gets worse, or does not go away  · You see blood in your urine or bowel movement  · You have numbness or tingling around your mouth or in an arm or leg  · You faint, feel dizzy, or have vision changes  · You have swelling in your lymph nodes  · You are a woman and have vaginal bleeding that is not normal for you, or is not expected  · You lose weight without trying, or you have trouble eating  · You feel weak or have muscle pain  · You have pain or swelling in your joints  · You have questions or concerns about your condition or care  Follow up with your healthcare provider as directed: You may need more tests  Your healthcare provider may also refer you to a specialist  Write down your questions so you remember to ask them during your visits  Manage fatigue:   · Keep a fatigue diary  Include anything that makes you feel more tired or less tired  Bring the diary with you to follow-up visits with your provider  · Exercise as directed  Exercise can help you feel more alert  Exercise can also help you manage stress or relieve depression  Try to get at least 30 minutes of exercise most days of the week  · Keep a regular sleep schedule  Go to bed and wake up at the same times every day  Limit naps to 1 hour each day  A nap can improve fatigue, but a long nap may make it harder to go to sleep at night  · Plan and limit your activities    Limit the number of activities such as shopping and cleaning you do each day  If possible, try to spread out your trips throughout the week  Plan ahead so you are not rushing to get something done  Only do activities that you have the energy to complete  Take breaks between activities  Ask for help if you need it  Another person may be able to drive you or help with daily activities  · Eat a variety of healthy foods  Healthy foods include fruits, vegetables, whole-grain breads, low-fat dairy products, beans, lean meats, and fish  Good nutrition can help manage fatigue  · Limit caffeine and alcohol  These can make it difficult to fall or stay asleep  Women should limit alcohol to 1 drink a day  Men should limit alcohol to 2 drinks a day  A drink of alcohol is 12 ounces of beer, 5 ounces of wine, or 1½ ounces of liquor  Ask our healthcare provider how much caffeine is safe for you  · Do not smoke  Nicotine and other chemicals in cigarettes and cigars can cause lung damage and increase fatigue  Ask your healthcare provider for information if you currently smoke and need help to quit  E-cigarettes or smokeless tobacco still contain nicotine  Talk to your healthcare provider before you use these products  © Copyright [address] [place] [address] Drive Information is for End User's use only and may not be sold, redistributed or otherwise used for commercial purposes  All illustrations and images included in CareNotes® are the copyrighted property of [employer]  or [address] [place], [place] [address]meliza Man   The above information is an  only  It is not intended as medical advice for individual conditions or treatments  Talk to your doctor, nurse or pharmacist before following any medical regimen to see if it is safe and effective for you  Sinusitis   WHAT YOU NEED TO KNOW:   Sinusitis is inflammation or infection of your sinuses  It is most often caused by a virus  Acute sinusitis may last up to 12 weeks  Chronic sinusitis lasts longer than 12 weeks   Recurrent sinusitis means you have 4 or more times in 1 year  DISCHARGE INSTRUCTIONS:   Return to the emergency department if:   · Your eye and eyelid are red, swollen, and painful  · You cannot open your eye  · You have vision changes, such as double vision  · Your eyeball bulges out or you cannot move your eye  · You are more sleepy than normal, or you notice changes in your ability to think, move, or talk  · You have a stiff neck, a fever, or a bad headache  · You have swelling of your forehead or scalp  Contact your healthcare provider if:   · Your symptoms do not improve after 3 days  · Your symptoms do not go away after 10 days  · You have nausea and are vomiting  · Your nose is bleeding  · You have questions or concerns about your condition or care  Medicines: Your symptoms may go away on their own  Your healthcare provider may recommend watchful waiting for up to 10 days before starting antibiotics  You may  need any of the following:  · Acetaminophen  decreases pain and fever  It is available without a doctor's order  Ask how much to take and how often to take it  Follow directions  Read the labels of all other medicines you are using to see if they also contain acetaminophen, or ask your doctor or pharmacist  Acetaminophen can cause liver damage if not taken correctly  Do not use more than 4 grams (4,000 milligrams) total of acetaminophen in one day  · NSAIDs , such as ibuprofen, help decrease swelling, pain, and fever  This medicine is available with or without a doctor's order  NSAIDs can cause stomach bleeding or kidney problems in certain people  If you take blood thinner medicine, always ask your healthcare provider if NSAIDs are safe for you  Always read the medicine label and follow directions  · Nasal steroid sprays  may help decrease inflammation in your nose and sinuses  · Decongestants  help reduce swelling and drain mucus in the nose and sinuses  They may help you breathe easier  · Antihistamines  help dry mucus in the nose and relieve sneezing  · Antibiotics  help treat or prevent a bacterial infection  · Take your medicine as directed  Contact your healthcare provider if you think your medicine is not helping or if you have side effects  Tell him or her if you are allergic to any medicine  Keep a list of the medicines, vitamins, and herbs you take  Include the amounts, and when and why you take them  Bring the list or the pill bottles to follow-up visits  Carry your medicine list with you in case of an emergency  Self-care:   · Rinse your sinuses  Use a sinus rinse device to rinse your nasal passages with a saline (salt water) solution or distilled water  Do not use tap water  This will help thin the mucus in your nose and rinse away pollen and dirt  It will also help reduce swelling so you can breathe normally  Ask your healthcare provider how often to do this  · Breathe in steam   Heat a bowl of water until you see steam  Lean over the bowl and make a tent over your head with a large towel  Breathe deeply for about 20 minutes  Be careful not to get too close to the steam or burn yourself  Do this 3 times a day  You can also breathe deeply when you take a hot shower  · Sleep with your head elevated  Place an extra pillow under your head before you go to sleep to help your sinuses drain  · Drink liquids as directed  Ask your healthcare provider how much liquid to drink each day and which liquids are best for you  Liquids will thin the mucus in your nose and help it drain  Avoid drinks that contain alcohol or caffeine  · Do not smoke, and avoid secondhand smoke  Nicotine and other chemicals in cigarettes and cigars can make your symptoms worse  Ask your healthcare provider for information if you currently smoke and need help to quit  E-cigarettes or smokeless tobacco still contain nicotine   Talk to your healthcare provider before you use these products  Prevent the spread of germs that cause sinusitis:  Wash your hands often with soap and water  Wash your hands after you use the bathroom, change a child's diaper, or sneeze  Wash your hands before you prepare or eat food  Follow up with your healthcare provider as directed: You may be referred to an ear, nose, and throat specialist  Write down your questions so you remember to ask them during your visits  © Copyright [address] Information is for End User's use only and may not be sold, redistributed or otherwise used for commercial purposes  All illustrations and images included in CareNotes® are the copyrighted property of [employer] M [employer] Inc  or [address] [place] [address]  The above information is an  only  It is not intended as medical advice for individual conditions or treatments  Talk to your doctor, nurse or pharmacist before following any medical regimen to see if it is safe and effective for you

## 2021-05-25 DIAGNOSIS — L65.9 HAIR LOSS: ICD-10-CM

## 2021-05-25 RX ORDER — KETOCONAZOLE 20 MG/ML
1 SHAMPOO TOPICAL WEEKLY
Qty: 240 ML | Refills: 1 | Status: SHIPPED | OUTPATIENT
Start: 2021-05-25 | End: 2021-06-01 | Stop reason: SDUPTHER

## 2021-05-28 LAB
25(OH)D3 SERPL-MCNC: 56 NG/ML (ref 30–100)
ALBUMIN SERPL-MCNC: 4.2 G/DL (ref 3.6–5.1)
ALBUMIN/GLOB SERPL: 1.6 (CALC) (ref 1–2.5)
ALP SERPL-CCNC: 74 U/L (ref 37–153)
ALT SERPL-CCNC: 16 U/L (ref 6–29)
APPEARANCE UR: CLEAR
AST SERPL-CCNC: 17 U/L (ref 10–35)
BACTERIA UR QL AUTO: ABNORMAL /HPF
BASOPHILS # BLD AUTO: 39 CELLS/UL (ref 0–200)
BASOPHILS NFR BLD AUTO: 0.7 %
BILIRUB SERPL-MCNC: 0.4 MG/DL (ref 0.2–1.2)
BILIRUB UR QL STRIP: NEGATIVE
BUN SERPL-MCNC: 12 MG/DL (ref 7–25)
BUN/CREAT SERPL: ABNORMAL (CALC) (ref 6–22)
CALCIUM SERPL-MCNC: 9.2 MG/DL (ref 8.6–10.4)
CHLORIDE SERPL-SCNC: 108 MMOL/L (ref 98–110)
CHOLEST SERPL-MCNC: 138 MG/DL
CHOLEST/HDLC SERPL: 2.5 (CALC)
CO2 SERPL-SCNC: 25 MMOL/L (ref 20–32)
COLOR UR: YELLOW
CREAT SERPL-MCNC: 0.9 MG/DL (ref 0.5–0.99)
EOSINOPHIL # BLD AUTO: 160 CELLS/UL (ref 15–500)
EOSINOPHIL NFR BLD AUTO: 2.9 %
ERYTHROCYTE [DISTWIDTH] IN BLOOD BY AUTOMATED COUNT: 14.3 % (ref 11–15)
GLOBULIN SER CALC-MCNC: 2.6 G/DL (CALC) (ref 1.9–3.7)
GLUCOSE SERPL-MCNC: 151 MG/DL (ref 65–99)
GLUCOSE UR QL STRIP: ABNORMAL
HBA1C MFR BLD: 7.2 % OF TOTAL HGB
HCT VFR BLD AUTO: 44 % (ref 35–45)
HCV AB S/CO SERPL IA: 0.01
HCV AB SERPL QL IA: NORMAL
HDLC SERPL-MCNC: 56 MG/DL
HGB BLD-MCNC: 14.7 G/DL (ref 11.7–15.5)
HGB UR QL STRIP: NEGATIVE
HYALINE CASTS #/AREA URNS LPF: ABNORMAL /LPF
KETONES UR QL STRIP: NEGATIVE
LDLC SERPL CALC-MCNC: 62 MG/DL (CALC)
LEUKOCYTE ESTERASE UR QL STRIP: NEGATIVE
LYMPHOCYTES # BLD AUTO: 2261 CELLS/UL (ref 850–3900)
LYMPHOCYTES NFR BLD AUTO: 41.1 %
M TB IFN-G CD4+ BCKGRND COR BLD-ACNC: 0 IU/ML
M TB IFN-G CD4+ BCKGRND COR BLD-ACNC: <0 IU/ML
M TB IFN-G CD4+ T-CELLS BLD-ACNC: 0.04 IU/ML
M TB TUBERC IFN-G BLD QL: NEGATIVE
M TB TUBERC IGNF/MITOGEN IGNF CONTROL: 6.73 IU/ML
MCH RBC QN AUTO: 28.4 PG (ref 27–33)
MCHC RBC AUTO-ENTMCNC: 33.4 G/DL (ref 32–36)
MCV RBC AUTO: 84.9 FL (ref 80–100)
MONOCYTES # BLD AUTO: 440 CELLS/UL (ref 200–950)
MONOCYTES NFR BLD AUTO: 8 %
NEUTROPHILS # BLD AUTO: 2602 CELLS/UL (ref 1500–7800)
NEUTROPHILS NFR BLD AUTO: 47.3 %
NITRITE UR QL STRIP: NEGATIVE
NONHDLC SERPL-MCNC: 82 MG/DL (CALC)
PH UR STRIP: 5.5 [PH] (ref 5–8)
PLATELET # BLD AUTO: 302 THOUSAND/UL (ref 140–400)
PMV BLD REES-ECKER: 9.9 FL (ref 7.5–12.5)
POTASSIUM SERPL-SCNC: 4.1 MMOL/L (ref 3.5–5.3)
PROT SERPL-MCNC: 6.8 G/DL (ref 6.1–8.1)
PROT UR QL STRIP: ABNORMAL
RBC # BLD AUTO: 5.18 MILLION/UL (ref 3.8–5.1)
RBC #/AREA URNS HPF: ABNORMAL /HPF
SL AMB EGFR AFRICAN AMERICAN: 79 ML/MIN/1.73M2
SL AMB EGFR NON AFRICAN AMERICAN: 68 ML/MIN/1.73M2
SODIUM SERPL-SCNC: 142 MMOL/L (ref 135–146)
SP GR UR STRIP: 1.02 (ref 1–1.03)
SQUAMOUS #/AREA URNS HPF: ABNORMAL /HPF
TRIGL SERPL-MCNC: 115 MG/DL
TSH SERPL-ACNC: 2.06 MIU/L (ref 0.4–4.5)
WBC # BLD AUTO: 5.5 THOUSAND/UL (ref 3.8–10.8)
WBC #/AREA URNS HPF: ABNORMAL /HPF

## 2021-06-01 ENCOUNTER — OFFICE VISIT (OUTPATIENT)
Dept: FAMILY MEDICINE CLINIC | Facility: CLINIC | Age: 66
End: 2021-06-01
Payer: COMMERCIAL

## 2021-06-01 VITALS
TEMPERATURE: 98.4 F | BODY MASS INDEX: 23.45 KG/M2 | SYSTOLIC BLOOD PRESSURE: 138 MMHG | HEIGHT: 67 IN | HEART RATE: 84 BPM | OXYGEN SATURATION: 98 % | WEIGHT: 149.4 LBS | RESPIRATION RATE: 16 BRPM | DIASTOLIC BLOOD PRESSURE: 62 MMHG

## 2021-06-01 DIAGNOSIS — E11.9 TYPE 2 DIABETES MELLITUS WITHOUT COMPLICATION, WITHOUT LONG-TERM CURRENT USE OF INSULIN (HCC): Primary | ICD-10-CM

## 2021-06-01 DIAGNOSIS — E78.5 DYSLIPIDEMIA: ICD-10-CM

## 2021-06-01 DIAGNOSIS — L65.9 HAIR LOSS: ICD-10-CM

## 2021-06-01 DIAGNOSIS — E55.9 VITAMIN D DEFICIENCY: ICD-10-CM

## 2021-06-01 PROCEDURE — 99214 OFFICE O/P EST MOD 30 MIN: CPT | Performed by: NURSE PRACTITIONER

## 2021-06-01 RX ORDER — AZITHROMYCIN 500 MG/1
TABLET, FILM COATED ORAL
COMMUNITY
End: 2021-10-15

## 2021-06-01 RX ORDER — KETOCONAZOLE 20 MG/ML
1 SHAMPOO TOPICAL WEEKLY
Qty: 240 ML | Refills: 1 | Status: SHIPPED | OUTPATIENT
Start: 2021-06-01 | End: 2021-11-24

## 2021-06-01 RX ORDER — AMOXICILLIN 875 MG/1
TABLET, COATED ORAL
COMMUNITY
End: 2021-10-15

## 2021-06-01 NOTE — PROGRESS NOTES
Diabetic Foot Exam    Patient's shoes and socks removed  Right Foot/Ankle   Right Foot Inspection  Skin Exam: skin normal and skin intact no dry skin, no warmth, no callus, no erythema, no maceration, no abnormal color, no pre-ulcer, no ulcer and no callus                          Toe Exam: ROM and strength within normal limits  Sensory   Vibration: intact    Monofilament testing: intact  Vascular  Capillary refills: < 3 seconds      Left Foot/Ankle  Left Foot Inspection  Skin Exam: skin normal and skin intactno dry skin, no warmth, no erythema, no maceration, normal color, no pre-ulcer, no ulcer and no callus                         Toe Exam: ROM and strength within normal limits                   Sensory   Vibration: intact    Monofilament: intact  Vascular  Capillary refills: < 3 seconds    Assign Risk Category:  No deformity present; Loss of protective sensation;  No weak pulses       Risk: 1

## 2021-06-01 NOTE — PROGRESS NOTES
BMI Counseling: Body mass index is 23 4 kg/m²  The BMI is above normal  Nutrition recommendations include decreasing portion sizes, encouraging healthy choices of fruits and vegetables, decreasing fast food intake, consuming healthier snacks, limiting drinks that contain sugar, moderation in carbohydrate intake, increasing intake of lean protein, reducing intake of saturated and trans fat and reducing intake of cholesterol  Exercise recommendations include vigorous physical activity 75 minutes/week, exercising 3-5 times per week and strength training exercises  No pharmacotherapy was ordered   BMI within range  23 40       Assessment/Plan:    Presents in office for follow up multiple issues   Sinus issues continue --> was seen by ENT and discussed treatment with natty meds irrigations and Flonase twice a day   Has follow up in 1 month   Type 2 diabetes Sees ENDO --> discussed HbA1C 7 2   Discussed diet - we have reviewed changes and noted that she has increased intake of smoothies and also some junk food cakes at night   She will go back and discuss further - will refer to Nutritionist   She is to keep log of her diet and follow up to discuss   Dry scalp - will refill Nizoral  shampoo -->  - and hair loss has much improved   Hyperlipidemia - stable   Vitamin D def - stable      Problem List Items Addressed This Visit        Endocrine    Type 2 diabetes mellitus without complication, without long-term current use of insulin ([facility] [address] ) - Primary       Lab Results   Component Value Date    HGBA1C 7 2 (H) 05/25/2021            Relevant Orders    Ambulatory referral to Nutrition Services    Comprehensive metabolic panel    CBC and differential    TSH, 3rd generation    Hemoglobin A1C    Lipid panel       Other    Vitamin D deficiency    Relevant Orders    Comprehensive metabolic panel    CBC and differential    TSH, 3rd generation    Hemoglobin A1C    Lipid panel    Dyslipidemia    Relevant Orders    Comprehensive metabolic panel    CBC and differential    TSH, 3rd generation    Hemoglobin A1C    Lipid panel      Other Visit Diagnoses     Hair loss        Relevant Medications    ketoconazole (NIZORAL) 2 % shampoo    Other Relevant Orders    Comprehensive metabolic panel    CBC and differential    TSH, 3rd generation    Hemoglobin A1C    Lipid panel            Subjective:      Patient ID: Blayne Goodwin is a 63 y o  female  Presents in office for follow up multiple issues   Sinus issues continue --> was seen by ENT and discussed treatment with natty meds irrigations and Flonase twice a day   Has follow up in 1 month   Type 2 diabetes Sees ENDO --> discussed HbA1C 7 2   Discussed diet - we have reviewed changes and noted that she has increased intake of smoothies and also some junk food cakes at night   She will go back and discuss further - will refer to Nutritionist   She is to keep log of her diet and follow up to discuss   Dry scalp - will refill Nizoral  shampoo -->  - and hair loss has much improved   Hyperlipidemia - stable   Vitamin D def - stable       The following portions of the patient's history were reviewed and updated as appropriate:   Past Medical History:  She has a past medical history of Diabetes mellitus ([address] ), Ear problems, Tinnitus, and [place] teeth extracted (08/14/2020)  ,  _______________________________________________________________________  Medical Problems:  does not have any pertinent problems on file ,  _______________________________________________________________________  Past Surgical History:   has no past surgical history on file ,  _______________________________________________________________________  Family History:  family history is not on file ,  _______________________________________________________________________  Social History:   reports that she has quit smoking   She has never used smokeless tobacco  She reports that she does not drink alcohol or use drugs ,  _______________________________________________________________________  Allergies:  has No Known Allergies     _______________________________________________________________________  Current Outpatient Medications   Medication Sig Dispense Refill    cycloSPORINE (Restasis) 0 05 % ophthalmic emulsion Restasis 0 05 % eye drops in a dropperette      Ergocalciferol (VITAMIN D2 PO) 50,000 Units      fluticasone (FLONASE) 50 mcg/act nasal spray 2 sprays into each nostril daily 1 Bottle 5    gabapentin (NEURONTIN) 100 mg capsule Take 100 mg by mouth as needed       Glucose Blood (BLOOD GLUCOSE TEST STRIPS) STRP Check BG BID      Glucose Blood (BLOOD GLUCOSE TEST STRIPS) STRP Use 2 (two) times a day      Lancets (OneTouch Delica Plus [id number]) MISC Use 2 (two) times a day      ONE TOUCH ULTRA TEST test strip TEST 1-2 TIMES DAILY  5    pravastatin (PRAVACHOL) 40 mg tablet Take 40 mg by mouth      sodium chloride (OCEAN) 0 65 % nasal spray 1 spray into each nostril as needed for congestion or rhinitis for up to 10 days 240 mL 1    amoxicillin (AMOXIL) 875 mg tablet amoxicillin 875 mg tablet   TAKE 1 TABLET BY MOUTH TWICE A DAY FOR 7 DAYS      azithromycin (ZITHROMAX) 500 MG tablet azithromycin 500 mg tablet      Dapagliflozin Propanediol 5 MG TABS Take 5 mg by mouth      ibuprofen (MOTRIN) 800 mg tablet ibuprofen 800 mg tablet      ketoconazole (NIZORAL) 2 % shampoo Apply 1 application topically once a week Weekly 240 mL 1     No current facility-administered medications for this visit       _______________________________________________________________________  Review of Systems   Constitutional: Negative for chills and fatigue  HENT: Negative for congestion, postnasal drip and sore throat  Was seen by ENT - for sinus issues   Has follow up in 1 month    Eyes: Negative  Respiratory: Negative for cough and shortness of breath      Cardiovascular: Negative for chest pain, palpitations and leg swelling  Gastrointestinal: Negative for abdominal distention, abdominal pain and nausea  Endocrine:        Type 2 diabetes   Under care of ENDO    Genitourinary: Negative for difficulty urinating and flank pain  Family history of kidney issues    Skin: Negative  Positive for hair loss - MUCH IMPROVED    Allergic/Immunologic: Positive for environmental allergies  Neurological: Negative for dizziness and headaches  Psychiatric/Behavioral: Negative for sleep disturbance and suicidal ideas  The patient is nervous/anxious  Objective:  Vitals:    06/01/21 1219   BP: 138/62   BP Location: Left arm   Patient Position: Sitting   Cuff Size: Standard   Pulse: 84   Resp: 16   Temp: 98 4 °F (36 9 °C)   TempSrc: Temporal   SpO2: 98%   Weight: 67 8 kg (149 lb 6 4 oz)   Height: 5' 7" (1 702 m)     Body mass index is 23 4 kg/m²  Physical Exam  Vitals signs and nursing note reviewed  Constitutional:       Appearance: Normal appearance  HENT:      Head: Atraumatic  Nose: No congestion  Mouth/Throat:      Mouth: Mucous membranes are dry  Pharynx: No oropharyngeal exudate or posterior oropharyngeal erythema  Eyes:      Extraocular Movements: Extraocular movements intact  Neck:      Musculoskeletal: Normal range of motion  Cardiovascular:      Rate and Rhythm: Normal rate and regular rhythm  Pulses: Normal pulses  Heart sounds: Normal heart sounds  Pulmonary:      Effort: Pulmonary effort is normal       Breath sounds: Normal breath sounds  Abdominal:      Palpations: Abdomen is soft  Musculoskeletal: Normal range of motion  Skin:     General: Skin is warm  Capillary Refill: Capillary refill takes less than 2 seconds  Neurological:      General: No focal deficit present  Mental Status: She is alert and oriented to person, place, and time     Psychiatric:         Mood and Affect: Mood normal          Behavior: Behavior normal          Contains abnormal data UA (URINE) with reflex to Scope  Order: [id number]  Status:  Final result   Visible to patient:  No (inaccessible in [address])   Next appt:  06/22/2021 at 02:30 PM in Otolaryngology Kristine Dubon MD)   Ref Range & Units 5/25/21 10:25 AM   Color UA YELLOW YELLOW    Urine Appearance CLEAR CLEAR    Specific Gravity 1 001 - 1 035 1 017    Ph 5 0 - 8 0 5 5    Glucose, Urine NEGATIVE 3+Abnormal     Bilirubin, Urine NEGATIVE NEGATIVE    Ketone, Urine NEGATIVE NEGATIVE    Blood, Urine NEGATIVE NEGATIVE    Protein, Urine NEGATIVE TRACEAbnormal     SL AMB NITRITES URINE, QUAL  NEGATIVE NEGATIVE    Leukocyte Esterase NEGATIVE NEGATIVE    SL AMB WBC, URINE < OR = 5 /HPF NONE SEEN    RBC, Urine < OR = 2 /HPF NONE SEEN    Squamous Epithelial Cells < OR = 5 /HPF 0-5    Bacteria, UA NONE SEEN /HPF NONE SEEN    Hyaline Casts NONE SEEN /LPF NONE SEEN       Narrative    FASTING:YES   FASTING: YES      Specimen Collected: 05/25/21 10:25 AM   Last Resulted: 05/28/21 12:13 AM                Quantiferon-TB Gold Plus, 1 tube  Order: [id number]  Status:  Final result   Visible to patient:  No (inaccessible in [address])   Next appt:  06/22/2021 at 02:30 PM in Otolaryngology Kristine Dubon MD)   Ref Range & Units 5/25/21 10:25 AM   Quest Quantiferon(R)-TB Gold Plus, 1 Tube NEGATIVE NEGATIVE    Comment: Negative test result  M  tuberculosis complex   infection unlikely  NIL IU/mL 0 04    Mitogen-NIL IU/mL 6 73    TB1-NIL IU/mL 0 00    TB2-NIL IU/mL <0 00    Comment:     The Nil tube value reflects the background interferon   gamma immune response of the patient's blood sample     This value has been subtracted from the patient's   displayed TB and Mitogen results        Lower than expected results with the Mitogen tube   prevent false-negative Quantiferon readings by   detecting a patient with a potential immune   suppressive condition and/or suboptimal pre-analytical   specimen handling        The TB1 Antigen tube is coated with the   M  tuberculosis-specific antigens designed to elicit   responses from TB antigen primed CD4+ helper   T-lymphocytes        The TB2 Antigen tube is coated with the   M  tuberculosis-specific antigens designed to elicit   responses from TB antigen primed CD4+ helper and CD8+   cytotoxic T-lymphocytes        For additional information, please refer to   [email or web address]/faq/EBH123   (This link is being provided for informational/   educational purposes only )           Narrative    FASTING:YES   FASTING: YES      Specimen Collected: 05/25/21 10:25 AM   Last Resulted: 05/28/21 12:13 AM           Contains abnormal data Hemoglobin A1c (w/out EAG)  Order: [id number]  Status:  Final result   Visible to patient:  No (inaccessible in [address])   Next appt:  06/22/2021 at 02:30 PM in Otolaryngology Dionne Anderson MD)   Ref Range & Units 5/25/21 10:25 AM   Hemoglobin A1C <5 7 % of total Hgb 7  2High     Comment: For someone without known diabetes, a hemoglobin A1c   value of 6 5% or greater indicates that they may have   diabetes and this should be confirmed with a follow-up   test        For someone with known diabetes, a value <7% indicates   that their diabetes is well controlled and a value   greater than or equal to 7% indicates suboptimal   control   A1c targets should be individualized based on   duration of diabetes, age, comorbid conditions, and   other considerations        Currently, no consensus exists regarding use of   hemoglobin A1c for diagnosis of diabetes for children            Narrative    FASTING:YES   FASTING: YES      Specimen Collected: 05/25/21 10:25 AM   Last Resulted: 05/28/21 12:13 AM              TSH, 3rd generation with Free T4 reflex  Order: [id number]  Status:  Final result   Visible to patient:  No (inaccessible in [address])   Next appt:  06/22/2021 at 02:30 PM in Otolaryngology Dionne Anderson MD)   Ref Range & Units 5/25/21 10:25 AM   TSH W/RFX TO FREE T4 0 40 - 4 50 mIU/L 2 06       Narrative    FASTING:YES   FASTING: YES      Specimen Collected: 05/25/21 10:25 AM   Last Resulted: 05/28/21 12:13 AM           Vitamin D 25 hydroxy  Order: [id number]  Status:  Final result   Visible to patient:  No (inaccessible in [address] [facility])   Next appt:  06/22/2021 at 02:30 PM in Otolaryngology Hermes Meyer MD)   Ref Range & Units 5/25/21 10:25 AM   Vitamin D, 25-Hydroxy, Serum 30 - 100 ng/mL 56    Comment: Vitamin D Status         25-OH Vitamin D:       Deficiency:                    <20 ng/mL   Insufficiency:             20 - 29 ng/mL   Optimal:                 > or = 30 ng/mL       For 25-OH Vitamin D testing on patients on   D2-supplementation and patients for whom quantitation   of D2 and D3 fractions is required, the QuestAssureD(TM)   25-OH VIT D, (D2,D3), LC/MS/MS is recommended: order   code 90820 (patients >2yrs)  See Note 1       Note 1       For additional information, please refer to   http://[employer]/faq/RHG682   (This link is being provided for informational/   educational purposes only )       Narrative    FASTING:YES   FASTING: YES      Specimen Collected: 05/25/21 10:25 AM   Last Resulted: 05/28/21 12:13 AM           Hepatitis C Ab W/Refl To HCV RNA, Qn, PCR  Order: [id number]  Status:  Final result   Visible to patient:  No (inaccessible in [address] [facility])   Next appt:  06/22/2021 at 02:30 PM in Otolaryngology Hermes Meyer MD)   Ref Range & Units 5/25/21 10:25 AM   HEP C AB NON-REACTIVE NON-REACTIVE    Signal to Cut-Off <1 00 0 01    Comment:     HCV antibody was non-reactive  There is no laboratory   evidence of HCV infection        In most cases, no further action is required   However,   if recent HCV exposure is suspected, a test for HCV RNA   (test code 08492) is suggested        For additional information please refer to http://[employer]/faq/PKT91a6   (This link is being provided for informational/   educational purposes only )           Narrative    FASTING:YES   FASTING: YES           Contains abnormal data CBC and differential  Order: [id number]  Status:  Final result   Visible to patient:  No (inaccessible in [place])   Next appt:  06/22/2021 at 02:30 PM in Otolaryngology Rachel Ann MD)   Ref Range & Units 5/25/21 10:25 AM   White Blood Cell Count 3 8 - 10 8 Thousand/uL 5 5    Red Blood Cell Count 3 80 - 5 10 Million/uL 5  18High     Hemoglobin 11 7 - 15 5 g/dL 14 7    HCT 35 0 - 45 0 % 44 0    MCV 80 0 - 100 0 fL 84 9    MCH 27 0 - 33 0 pg 28 4    MCHC 32 0 - 36 0 g/dL 33 4    RDW 11 0 - 15 0 % 14 3    Platelet Count 460 - 400 Thousand/uL 302    SL AMB MPV 7 5 - 12 5 fL 9 9    Neutrophils (Absolute) 1,500 - 7,800 cells/uL 2,602    Lymphocytes (Absolute) 850 - 3,900 cells/uL 2,261    Monocytes (Absolute) 200 - 950 cells/uL 440    Eosinophils (Absolute) 15 - 500 cells/uL 160    Basophils ABS 0 - 200 cells/uL 39    Neutrophils % 47 3    Lymphocytes % 41 1    Monocytes % 8 0    Eosinophils % 2 9    Basophils PCT % 0 7       Narrative    FASTING:YES   FASTING: YES      Specimen Collected: 05/25/21 10:25 AM   Last Resulted: 05/28/21 12:13 AM           Contains abnormal data Comprehensive metabolic panel  Order: [id number]  Status:  Final result   Visible to patient:  No (inaccessible in [facility])   Next appt:  06/22/2021 at 02:30 PM in Otolaryngology Rachel Ann MD)   Ref Range & Units 5/25/21 10:25 AM   Glucose, Random 65 - 99 mg/dL 151High     Comment:                Fasting reference interval       For someone without known diabetes, a glucose   value >125 mg/dL indicates that they may have   diabetes and this should be confirmed with a   follow-up test         BUN 7 - 25 mg/dL 12    Creatinine 0 50 - 0 99 mg/dL 0 90    Comment: For patients >52years of age, the reference limit   for Creatinine is approximately 13% higher for people   identified as -American         eGFR Non African American > OR = 60 mL/min/1 73m2 68    eGFR African American > OR = 60 mL/min/1 73m2 79    SL AMB BUN/CREATININE RATIO 6 - 22 (calc) NOT APPLICABLE    Sodium 018 - 146 mmol/L 142    Potassium 3 5 - 5 3 mmol/L 4 1    Chloride 98 - 110 mmol/L 108    CO2 20 - 32 mmol/L 25    Calcium 8 6 - 10 4 mg/dL 9 2    Protein, Total 6 1 - 8 1 g/dL 6 8    Albumin 3 6 - 5 1 g/dL 4 2    Globulin 1 9 - 3 7 g/dL (calc) 2 6    Albumin/Globulin Ratio 1 0 - 2 5 (calc) 1 6    TOTAL BILIRUBIN 0 2 - 1 2 mg/dL 0 4    Alkaline Phosphatase 37 - 153 U/L 74    AST 10 - 35 U/L 17    ALT 6 - 29 U/L 16       Narrative    FASTING:YES   FASTING: YES      Specimen Collected: 05/25/21 10:25 AM   Last Resulted: 05/28/21 12:13 AM           Lipid panel  Order: [id number]  Status:  Final result   Visible to patient:  No (inaccessible in [facility])   Next appt:  06/22/2021 at 02:30 PM in Otolaryngology Rigoberto Nails MD)   Ref Range & Units 5/25/21 10:25 AM   Total Cholesterol <200 mg/dL 138    HDL > OR = 50 mg/dL 56    Triglycerides <150 mg/dL 115    LDL Calculated mg/dL (calc) 62    Comment: Reference range: <100       Desirable range <100 mg/dL for primary prevention;     <70 mg/dL for patients with CHD or diabetic patients   with > or = 2 CHD risk factors        LDL-C is now calculated using the Taras-Carvalho   calculation, which is a validated novel method providing   better accuracy than the Friedewald equation in the   estimation of LDL-C  Lili Kulkarni  86;520(97): 6378-7217   (http://[employer]/faq/CMW201)    Chol HDLC Ratio <5 0 (calc) 2 5    Non-HDL Cholesterol <130 mg/dL (calc) 82    Comment: For patients with diabetes plus 1 major ASCVD risk   factor, treating to a non-HDL-C goal of <100 mg/dL   (LDL-C of <70 mg/dL) is considered a therapeutic   option  Narrative    FASTING:YES   FASTING: YES      Specimen Collected: 05/25/21 10:25 AM   Last Resulted: 05/28/21 12:13 AM

## 2021-06-01 NOTE — PATIENT INSTRUCTIONS
Diabetes and Nutrition   WHAT YOU NEED TO KNOW:   Nutrition plans help with healthy eating patterns that improve health  Nutrition plans and regular exercise help keep your blood sugar levels steady  They also help delay or prevent complications of diabetes, such as diabetic kidney disease  DISCHARGE INSTRUCTIONS:   Call your local emergency number (911 in the [facility] [facility]) if:   · You have any of the following signs of a heart attack:      ? Squeezing, pressure, or pain in your chest    ? You may  also have any of the following:     § Discomfort or pain in your back, neck, jaw, stomach, or arm    § Shortness of breath    § Nausea or vomiting    § Lightheadedness or a sudden cold sweat      Return to the emergency department if:   · You have a low blood sugar level and it does not improve with treatment  Symptoms are trouble thinking, a pounding heartbeat, and sweating  · Your blood sugar level is above 240 mg/dL and does not come down within 15 minutes of treatment  · You have ketones in your blood or urine  · You have nausea or are vomiting and cannot keep any food or liquid down  · You have blurred or double vision  · Your breath has a fruity, sweet smell, or your breathing is shallow  Call your doctor or diabetes care team if:   · Your blood sugar levels are higher than your target goals  · You often have low blood sugar levels  · You have trouble coping with diabetes, or you feel anxious or depressed  · You have questions or concerns about your condition or care  A dietitian will help you create a nutrition plan  to meet your needs and your family's needs  The goal is for you to reach or maintain healthy weight, blood sugar, blood pressure, and lipid levels  You should meet with the dietitian at least 1 time each year   You will learn the following:  · How food affects your blood sugar levels    · How to create healthy eating habits    · How to make food choices based on your activity level, weight, and glucose levels    · How your favorite foods may fit into your plan    · How to keep track of carbohydrates    · Correct portion sizes for each food    · Changes you can make to your plan if you get pregnant or are breastfeeding    Do not skip meals: The goal is to keep your blood sugar level steady  Blood sugar levels may drop too low if you have received insulin and do not eat  Eat more high-fiber foods:  High-fiber foods include fresh or frozen fruits and vegetables, whole-grain breads, and beans  Fiber helps control or lower blood sugar and cholesterol levels  Choose whole fruits instead of fruit juice as much as possible  Sugar may be added to juice, and fiber may be removed  Choose heart-healthy fats:  Foods high in heart-healthy fats include olive oil, nuts, avocados, and fatty fish, such as salmon and tuna  Foods high in unhealthy fats include red meat, full-fat dairy products, and soft margarine  Unhealthy fats can increase your risk for heart disease, increase bad cholesterol, and lower good cholesterol  Choose complex carbohydrates:  Foods with complex carbohydrates include brown rice, whole-grain breads and cereals, and cooked beans  Foods with simple carbohydrates include white bread, white rice, most cold cereals, and snack foods  Your plan will include the amount of carbohydrate to have at one time or in a day  Your blood sugar level can get too high if you eat too much carbohydrate at one time  Blood sugar levels do not spike as high or drop as quickly with complex carbohydrates as with simple carbohydrates  Choose complex carbohydrates whenever possible  Have less sodium (salt): The risk for high blood pressure (BP) increases with high-sodium foods  Limit high-sodium foods, such as soy sauce, potato chips, and canned soup  Do not add salt to food you cook  Limit your use of table salt  Read labels to have no more than 2,300 milligrams of sodium in one day       Limit artificial sweeteners: These may be found in food or drinks, such as diet soft drinks or other low-calorie beverages  Artificial sweeteners are low in calories  They may help you lower your overall calories and carbohydrates  It is important not to have more calories from other foods to make up for the calories saved  Artificial sweeteners do not have any nutrition  Eat whole foods and drink water as much as possible  Your plan may include beverages with artificial sweeteners for a short time  These can help you transition from high-sugar beverages to water  Use the plate method for each meal:  This method can help you eat the right amount of carbohydrates and keep your blood sugar levels under control  · Draw an imaginary line down the middle of a 9-inch dinner plate  On one side, draw another line to divide that section in half  Your plate will have one large section and 2 small sections  · Fill the largest section with non-starchy vegetables  These include broccoli, spinach, cucumbers, peppers, cauliflower, and tomatoes  · Add a starch to one of the small sections  Starches include pasta, rice, whole-grain bread, tortillas, corn, potatoes, and beans  · Add meat or another source of protein to the other small section  Examples include chicken or turkey without skin, fish, lean beef or pork, low-fat cheese, tofu, and eggs  · Add dairy products or fruit next to your plate if your meal plan allows  Examples of dairy include skim or 1% milk and low-fat yogurt  If you do not drink milk or eat dairy products, you may be able to add another serving of starchy food instead  · Have a low-calorie or calorie-free drink with your meal  Examples include water or unsweetened tea or coffee  Know the risks if you choose to drink alcohol:  Alcohol can cause hypoglycemia (low blood sugar level), especially if you use insulin   Alcohol can cause high blood sugar and BP levels, and weight gain if you drink too much  Women 21 years or older and men 72 years or older should limit alcohol to 1 drink a day  Men aged [address] to 59 years should limit alcohol to 2 drinks a day  A drink of alcohol is 12 ounces of beer, 5 ounces of wine, or 1½ ounces of liquor  Hypoglycemia can happen hours after you drink alcohol  Check your blood sugar level for several hours after you drink alcohol  Have a source of fast-acting carbohydrates with you in case your level goes too low  You need immediate care if you have signs or symptoms of hypoglycemia, such as sweating, confusion, or fainting  Maintain a healthy weight:  A healthy weight can help you control your diabetes  You can maintain a healthy weight with a nutrition plan and exercise  Ask your healthcare provider how much you should weigh  Ask him or her to help you create a weight loss plan if you are overweight  Together you can set weight loss and maintenance goals  Follow up with your doctor or diabetes team as directed:  Write down your questions so you remember to ask them during your visits  © Copyright [address] Information is for End User's use only and may not be sold, redistributed or otherwise used for commercial purposes  All illustrations and images included in CareNotes® are the copyrighted property of [employer]  or [address] [place], [place] [address]anne   The above information is an  only  It is not intended as medical advice for individual conditions or treatments  Talk to your doctor, nurse or pharmacist before following any medical regimen to see if it is safe and effective for you

## 2021-07-06 ENCOUNTER — OFFICE VISIT (OUTPATIENT)
Dept: DERMATOLOGY | Facility: CLINIC | Age: 66
End: 2021-07-06
Payer: COMMERCIAL

## 2021-07-06 DIAGNOSIS — L82.1 SEBORRHEIC KERATOSIS: ICD-10-CM

## 2021-07-06 DIAGNOSIS — L28.0 LICHEN SIMPLEX CHRONICUS: Primary | ICD-10-CM

## 2021-07-06 DIAGNOSIS — L65.9 ALOPECIA: ICD-10-CM

## 2021-07-06 DIAGNOSIS — Z13.89 SCREENING FOR SKIN CONDITION: ICD-10-CM

## 2021-07-06 PROCEDURE — 99214 OFFICE O/P EST MOD 30 MIN: CPT | Performed by: DERMATOLOGY

## 2021-07-06 NOTE — PATIENT INSTRUCTIONS
Lichen simplex chronicus improved patient advised to be more consistent with the topical steroids to see if we can make it go away however the discoloration that is present may not resolved completely  Alopecia again consistent with probable scarring alopecia no treatment at this time  Seborrheic Keratosis  Patient reasurred these are normal growths we acquire with age no treatment needed    Screening for Dermatologic Disorders: Nothing else of concern noted on complete exam follow up in 1 year

## 2021-07-06 NOTE — PROGRESS NOTES
[facility] DERMATOLOGY  [address] [place] [address]  Yuri Ortiz [place] [address]  [phone]  [phone]     MRN: [id number] : 1955  Encounter: [id number]  Patient Information: Khadijah Mitchell  Chief complaint:  Yearly Checkup    History of present illness:  81-year-old female with known history of alopecia that I felt was probably a scarring process patient has been using ketoconazole shampoo also concerned regarding the itching red rash on her legs which has improved somewhat with the topical steroids but she has not been real consistent with treatment only uses it when it itches and lastly concerned regarding several growths on her skin scalp  Past Medical History:   Diagnosis Date    Diabetes mellitus ([address] )     Ear problems     Tinnitus     [place] teeth extracted 2020     History reviewed  No pertinent surgical history  Social History   Social History     Substance and Sexual Activity   Alcohol Use No     Social History     Substance and Sexual Activity   Drug Use No     Social History     Tobacco Use   Smoking Status Former Smoker   Smokeless Tobacco Never Used     History reviewed  No pertinent family history  Meds/Allergies   No Known Allergies    Meds:  Prior to Admission medications    Medication Sig Start Date End Date Taking?  Authorizing Provider   cycloSPORINE (Restasis) 0 05 % ophthalmic emulsion Restasis 0 05 % eye drops in a dropperette   Yes Historical Provider, MD   Dapagliflozin Propanediol 5 MG TABS Take 5 mg by mouth 3/2/21  Yes Historical Provider, MD   Ergocalciferol (VITAMIN D2 PO) 50,000 Units 3/2/21  Yes Historical Provider, MD   fluticasone (FLONASE) 50 mcg/act nasal spray 2 sprays into each nostril daily 21  Yes Americo Sanchez MD   gabapentin (NEURONTIN) 100 mg capsule Take 100 mg by mouth as needed    Yes Historical Provider, MD   Glucose Blood (BLOOD GLUCOSE TEST STRIPS) STRP Check BG BID 3/2/21  Yes Historical Provider, MD   Glucose Blood (BLOOD GLUCOSE TEST STRIPS) STRP Use 2 (two) times a day 3/4/21  Yes Historical Provider, MD   ibuprofen (MOTRIN) 800 mg tablet ibuprofen 800 mg tablet   Yes Historical Provider, MD   Lancets (OneTouch Delica Plus CHMVVH45A) MISC Use 2 (two) times a day 3/4/21  Yes Historical Provider, MD   ONE TOUCH ULTRA TEST test strip TEST 1-2 TIMES DAILY 4/30/18  Yes Historical Provider, MD   pravastatin (PRAVACHOL) 40 mg tablet Take 40 mg by mouth 3/2/21 3/2/22 Yes Historical Provider, MD   amoxicillin (AMOXIL) 875 mg tablet amoxicillin 875 mg tablet   TAKE 1 TABLET BY MOUTH TWICE A DAY FOR 7 DAYS  Patient not taking: Reported on 7/6/2021    Historical Provider, MD   azithromycin (ZITHROMAX) 500 MG tablet azithromycin 500 mg tablet  Patient not taking: Reported on 7/6/2021    Historical Provider, MD   ketoconazole (NIZORAL) 2 % shampoo Apply 1 application topically once a week Weekly 6/1/21 7/1/21  MARSHALL Locke   sodium chloride (OCEAN) 0 65 % nasal spray 1 spray into each nostril as needed for congestion or rhinitis for up to 10 days 3/30/21 6/1/21  MARSHALL Locke       Subjective:     Review of Systems:    General: negative for - chills, fatigue, fever,  weight gain or weight loss  Psychological: negative for - anxiety, behavioral disorder, concentration difficulties, decreased libido, depression, irritability, memory difficulties, mood swings, sleep disturbances or suicidal ideation  ENT: negative for - hearing difficulties , nasal congestion, nasal discharge, oral lesions, sinus pain, sneezing, sore throat  Allergy and Immunology: negative for - hives, insect bite sensitivity,  Hematological and Lymphatic: negative for - bleeding problems, blood clots,bruising, swollen lymph nodes  Endocrine: negative for - hair pattern changes, hot flashes, malaise/lethargy, mood swings, palpitations, polydipsia/polyuria, skin changes, temperature intolerance or unexpected weight change  Respiratory: negative for - cough, hemoptysis, orthopnea, shortness of breath, or wheezing  Cardiovascular: negative for - chest pain, dyspnea on exertion, edema,  Gastrointestinal: negative for - abdominal pain, nausea/vomiting  Genito-Urinary: negative for - dysuria, incontinence, irregular/heavy menses or urinary frequency/urgency  Musculoskeletal: negative for - gait disturbance, joint pain, joint stiffness, joint swelling, muscle pain, muscular weakness  Dermatological:  As in HPI  Neurological: negative for confusion, dizziness, headaches, impaired coordination/balance, memory loss, numbness/tingling, seizures, speech problems, tremors or weakness       Objective: There were no vitals taken for this visit  Physical Exam:    General Appearance:    Alert, cooperative, no distress   Head:    Normocephalic, without obvious abnormality, atraumatic           Skin:   A full skin exam was performed including scalp, head scalp, eyes, ears, nose, lips, neck, chest, axilla, abdomen, back, buttocks, bilateral upper extremities, bilateral lower extremities, hands, feet, fingers, toes, fingernails, and toenails slightly lichenified patches noted on the lower legs normal keratotic papules with greasy stuck on appearance hair loss as noted previously in the bitemporal area else remarkable     Assessment:     1  Lichen simplex chronicus     2  Alopecia     3  Screening for skin condition     4  Seborrheic keratosis           Plan:   Lichen simplex chronicus improved patient advised to be more consistent with the topical steroids to see if we can make it go away however the discoloration that is present may not resolved completely  Alopecia again consistent with probable scarring alopecia no treatment at this time  Seborrheic Keratosis  Patient reasurred these are normal growths we acquire with age no treatment needed    Screening for Dermatologic Disorders: Nothing else of concern noted on complete exam follow up in 1 year       Karlo Sauceda MD  7/6/2021,11:32 AM    Portions of the record may have been created with voice recognition software   Occasional wrong word or "sound a like" substitutions may have occurred due to the inherent limitations of voice recognition software   Read the chart carefully and recognize, using context, where substitutions have occurred

## 2021-08-03 ENCOUNTER — CLINICAL SUPPORT (OUTPATIENT)
Dept: NUTRITION | Facility: HOSPITAL | Age: 66
End: 2021-08-03
Payer: COMMERCIAL

## 2021-08-03 DIAGNOSIS — E11.9 TYPE 2 DIABETES MELLITUS WITHOUT COMPLICATION, WITHOUT LONG-TERM CURRENT USE OF INSULIN (HCC): ICD-10-CM

## 2021-08-03 PROCEDURE — 97802 MEDICAL NUTRITION INDIV IN: CPT

## 2021-10-12 RX ORDER — ACETAMINOPHEN AND CODEINE PHOSPHATE 300; 30 MG/1; MG/1
TABLET ORAL
COMMUNITY
End: 2021-10-15

## 2021-10-15 ENCOUNTER — OFFICE VISIT (OUTPATIENT)
Dept: FAMILY MEDICINE CLINIC | Facility: CLINIC | Age: 66
End: 2021-10-15
Payer: COMMERCIAL

## 2021-10-15 VITALS
DIASTOLIC BLOOD PRESSURE: 70 MMHG | SYSTOLIC BLOOD PRESSURE: 128 MMHG | TEMPERATURE: 97.2 F | BODY MASS INDEX: 22.98 KG/M2 | WEIGHT: 146.4 LBS | HEIGHT: 67 IN | RESPIRATION RATE: 17 BRPM

## 2021-10-15 DIAGNOSIS — R53.82 CHRONIC FATIGUE: ICD-10-CM

## 2021-10-15 DIAGNOSIS — Z12.11 SCREENING FOR COLON CANCER: Primary | ICD-10-CM

## 2021-10-15 DIAGNOSIS — G62.9 NEUROPATHY: ICD-10-CM

## 2021-10-15 DIAGNOSIS — N18.2 CKD (CHRONIC KIDNEY DISEASE) STAGE 2, GFR 60-89 ML/MIN: ICD-10-CM

## 2021-10-15 DIAGNOSIS — E78.5 DYSLIPIDEMIA: ICD-10-CM

## 2021-10-15 DIAGNOSIS — R87.810 CERVICAL HIGH RISK HPV (HUMAN PAPILLOMAVIRUS) TEST POSITIVE: ICD-10-CM

## 2021-10-15 DIAGNOSIS — L20.84 INTRINSIC ATOPIC DERMATITIS: ICD-10-CM

## 2021-10-15 DIAGNOSIS — Z11.4 SCREENING FOR HIV (HUMAN IMMUNODEFICIENCY VIRUS): ICD-10-CM

## 2021-10-15 DIAGNOSIS — F17.210 CIGARETTE NICOTINE DEPENDENCE WITHOUT COMPLICATION: ICD-10-CM

## 2021-10-15 DIAGNOSIS — Z12.31 ENCOUNTER FOR SCREENING MAMMOGRAM FOR MALIGNANT NEOPLASM OF BREAST: ICD-10-CM

## 2021-10-15 DIAGNOSIS — E11.9 TYPE 2 DIABETES MELLITUS WITHOUT COMPLICATION, WITHOUT LONG-TERM CURRENT USE OF INSULIN (HCC): ICD-10-CM

## 2021-10-15 PROCEDURE — 99214 OFFICE O/P EST MOD 30 MIN: CPT | Performed by: NURSE PRACTITIONER

## 2021-11-23 LAB — COLOGUARD RESULT REPORTABLE: NEGATIVE

## 2021-11-24 DIAGNOSIS — L65.9 HAIR LOSS: ICD-10-CM

## 2021-11-24 RX ORDER — KETOCONAZOLE 20 MG/ML
1 SHAMPOO TOPICAL WEEKLY
Qty: 240 ML | Refills: 1 | Status: SHIPPED | OUTPATIENT
Start: 2021-11-24 | End: 2022-04-15 | Stop reason: SDUPTHER

## 2021-12-04 DIAGNOSIS — J01.11 ACUTE RECURRENT FRONTAL SINUSITIS: ICD-10-CM

## 2021-12-06 RX ORDER — SODIUM CHLORIDE 0.65 %
AEROSOL, SPRAY (ML) NASAL
Qty: 88 ML | Refills: 1 | Status: SHIPPED | OUTPATIENT
Start: 2021-12-06

## 2022-04-15 ENCOUNTER — OFFICE VISIT (OUTPATIENT)
Dept: FAMILY MEDICINE CLINIC | Facility: CLINIC | Age: 67
End: 2022-04-15
Payer: COMMERCIAL

## 2022-04-15 VITALS
RESPIRATION RATE: 17 BRPM | WEIGHT: 144.2 LBS | BODY MASS INDEX: 22.63 KG/M2 | TEMPERATURE: 97.3 F | HEART RATE: 77 BPM | SYSTOLIC BLOOD PRESSURE: 126 MMHG | DIASTOLIC BLOOD PRESSURE: 70 MMHG | OXYGEN SATURATION: 97 % | HEIGHT: 67 IN

## 2022-04-15 DIAGNOSIS — E55.9 VITAMIN D DEFICIENCY: ICD-10-CM

## 2022-04-15 DIAGNOSIS — E78.5 DYSLIPIDEMIA: ICD-10-CM

## 2022-04-15 DIAGNOSIS — L65.9 HAIR LOSS: ICD-10-CM

## 2022-04-15 DIAGNOSIS — Z86.010 HISTORY OF COLON POLYPS: ICD-10-CM

## 2022-04-15 DIAGNOSIS — E11.9 TYPE 2 DIABETES MELLITUS WITHOUT COMPLICATION, WITHOUT LONG-TERM CURRENT USE OF INSULIN (HCC): ICD-10-CM

## 2022-04-15 DIAGNOSIS — Z00.00 ROUTINE GENERAL MEDICAL EXAMINATION AT A HEALTH CARE FACILITY: ICD-10-CM

## 2022-04-15 DIAGNOSIS — N18.2 CKD (CHRONIC KIDNEY DISEASE) STAGE 2, GFR 60-89 ML/MIN: Primary | ICD-10-CM

## 2022-04-15 DIAGNOSIS — L81.9 DISCOLORATION OF SKIN: ICD-10-CM

## 2022-04-15 DIAGNOSIS — D22.9 ATYPICAL MOLE: ICD-10-CM

## 2022-04-15 DIAGNOSIS — E78.00 PURE HYPERCHOLESTEROLEMIA: ICD-10-CM

## 2022-04-15 PROBLEM — G62.9 POLYNEUROPATHY: Status: ACTIVE | Noted: 2018-12-16

## 2022-04-15 PROCEDURE — 99396 PREV VISIT EST AGE 40-64: CPT | Performed by: NURSE PRACTITIONER

## 2022-04-15 RX ORDER — LOSARTAN POTASSIUM 50 MG/1
50 TABLET ORAL DAILY
COMMUNITY
Start: 2022-01-26

## 2022-04-15 RX ORDER — KETOCONAZOLE 20 MG/ML
1 SHAMPOO TOPICAL WEEKLY
Qty: 240 ML | Refills: 1 | Status: SHIPPED | OUTPATIENT
Start: 2022-04-15 | End: 2022-05-15

## 2022-04-15 NOTE — PROGRESS NOTES
Assessment/Plan:    Is a 44-year-old female presents in office for follow-up multiple diagnosis and annual physical    here to review lab work and follow-up on the for allergy workup she is type 2 diabetic currently on Farxiga 5 mg daily primarily managing her diabetes with diet management which she has been compliant for a long time she has underlying  HbA1C 6 7   CKD stage 2 currently on losartan at 50 mg once a day her hypertension is well managed  Hyperlipidemia she is on Pravachol 40 mg daily  Continues to have some hair loss even though she feels that has gotten better with the shampoo discussed follow-up with Dermatology as she does have some atypical skin moles and follow-up on here loss she has an atypical mall on top of her scalp which needs to be looked that and probably biopsied        Problem List Items Addressed This Visit        Endocrine    Type 2 diabetes mellitus without complication, without long-term current use of insulin (HCC)    Relevant Orders    Comprehensive metabolic panel    CBC and differential    TSH, 3rd generation    Lipid panel    UA (URINE) with reflex to Scope    HEMOGLOBIN A1C W/ EAG ESTIMATION    Microalbumin / creatinine urine ratio    Vitamin D 25 hydroxy       Genitourinary    CKD (chronic kidney disease) stage 2, GFR 60-89 ml/min - Primary    Relevant Orders    Ambulatory Referral to Nephrology    Comprehensive metabolic panel    CBC and differential    TSH, 3rd generation    Lipid panel    UA (URINE) with reflex to Scope    HEMOGLOBIN A1C W/ EAG ESTIMATION    Microalbumin / creatinine urine ratio    Vitamin D 25 hydroxy       Other    Vitamin D deficiency    Relevant Orders    Comprehensive metabolic panel    CBC and differential    TSH, 3rd generation    Lipid panel    UA (URINE) with reflex to Scope    HEMOGLOBIN A1C W/ EAG ESTIMATION    Microalbumin / creatinine urine ratio    Vitamin D 25 hydroxy    Dyslipidemia    Relevant Orders    Comprehensive metabolic panel    CBC and differential    TSH, 3rd generation    Lipid panel    UA (URINE) with reflex to Scope    HEMOGLOBIN A1C W/ EAG ESTIMATION    Microalbumin / creatinine urine ratio    Vitamin D 25 hydroxy    Pure hypercholesterolemia    Relevant Orders    Comprehensive metabolic panel    CBC and differential    TSH, 3rd generation    Lipid panel    UA (URINE) with reflex to Scope    HEMOGLOBIN A1C W/ EAG ESTIMATION    Microalbumin / creatinine urine ratio    Vitamin D 25 hydroxy      Other Visit Diagnoses     History of colon polyps        Relevant Orders    Ambulatory Referral to Gastroenterology    Atypical mole        Relevant Orders    Ambulatory Referral to Dermatology    Comprehensive metabolic panel    CBC and differential    TSH, 3rd generation    Lipid panel    UA (URINE) with reflex to Scope    HEMOGLOBIN A1C W/ EAG ESTIMATION    Microalbumin / creatinine urine ratio    Vitamin D 25 hydroxy    Discoloration of skin        Relevant Orders    Ambulatory Referral to Dermatology    Comprehensive metabolic panel    CBC and differential    TSH, 3rd generation    Lipid panel    UA (URINE) with reflex to Scope    HEMOGLOBIN A1C W/ EAG ESTIMATION    Microalbumin / creatinine urine ratio    Vitamin D 25 hydroxy    Hair loss        Relevant Medications    ketoconazole (NIZORAL) 2 % shampoo    Routine general medical examination at a health care facility                Subjective:      Patient ID: Sarita Cross is a 61 y o  female  HPI    The following portions of the patient's history were reviewed and updated as appropriate:   Past Medical History:  She has a past medical history of Diabetes mellitus ([facility] [address] ), Ear problems, Tinnitus, and [place] teeth extracted (08/14/2020)  ,  _______________________________________________________________________  Medical Problems:  does not have any pertinent problems on file ,  _______________________________________________________________________  Past Surgical History:   has no past surgical history on file ,  _______________________________________________________________________  Family History:  family history is not on file ,  _______________________________________________________________________  Social History:   reports that she has quit smoking  She has never used smokeless tobacco  She reports that she does not drink alcohol and does not use drugs  ,  _______________________________________________________________________  Allergies:  has No Known Allergies     _______________________________________________________________________  Current Outpatient Medications   Medication Sig Dispense Refill    [facility] Saline Nasal [place] 0 65 % nasal spray 1 SPRAY INTO EACH NOSTRIL AS NEEDED FOR CONGESTION OR RHINITIS FOR UP TO 10 DAYS 88 mL 1    Dapagliflozin Propanediol 5 MG TABS Take 5 mg by mouth      Ergocalciferol (VITAMIN D2 PO) 50,000 Units once a week       fluticasone (FLONASE) 50 mcg/act nasal spray 2 sprays into each nostril daily 1 Bottle 5    gabapentin (NEURONTIN) 100 mg capsule Take 100 mg by mouth as needed       Glucose Blood (BLOOD GLUCOSE TEST STRIPS) STRP Check BG BID      Glucose Blood (BLOOD GLUCOSE TEST STRIPS) STRP Use 2 (two) times a day      ketoconazole (NIZORAL) 2 % shampoo Apply 1 application topically once a week Weekly 240 mL 1    Lancets (OneTouch Delica Plus [id number]) MISC Use 2 (two) times a day      losartan (COZAAR) 50 mg tablet Take 50 mg by mouth daily      ONE TOUCH ULTRA TEST test strip TEST 1-2 TIMES DAILY  5    pravastatin (PRAVACHOL) 40 mg tablet Take 40 mg by mouth       No current facility-administered medications for this visit      _______________________________________________________________________  Review of Systems   Constitutional: Negative for chills, fatigue and fever  HENT: Negative for congestion, postnasal drip and sore throat  Was seen by ENT continues treatment for sinus congestion / inflammation    Eyes: Negative  Respiratory: Negative for cough and shortness of breath  Cardiovascular: Negative for chest pain, palpitations and leg swelling  Hypertension and hyperlipidemia stable   Gastrointestinal: Negative for abdominal distention, abdominal pain and nausea  Endocrine:        Type 2 diabetes   Under care of ENDO    Genitourinary: Negative for difficulty urinating and flank pain  Family history of kidney issues   CKD 2 her last GFR 84   Musculoskeletal: Negative for arthralgias and myalgias  Skin: Negative  Positive for hair loss - MUCH IMPROVED   Noted some atypical moles on her scalp and back needs to be evaluated by Dermatology   Allergic/Immunologic: Positive for environmental allergies  Neurological: Negative for dizziness and headaches  Hematological: Negative for adenopathy  Psychiatric/Behavioral: Negative for sleep disturbance and suicidal ideas  The patient is nervous/anxious (tearful in office - lots of stress at home  not feeling well - emotional support provided )  Objective:  Vitals:    04/15/22 1137   BP: 126/70   BP Location: Left arm   Patient Position: Sitting   Cuff Size: Standard   Pulse: 77   Resp: 17   Temp: (!) 97 3 °F (36 3 °C)   TempSrc: Temporal   SpO2: 97%   Weight: 65 4 kg (144 lb 3 2 oz)   Height: 5' 7" (1 702 m)     Body mass index is 22 58 kg/m²  Physical Exam  Vitals and nursing note reviewed  Constitutional:       Appearance: Normal appearance  Comments: BMI 22 58   HENT:      Head: Normocephalic and atraumatic  Nose: No congestion or rhinorrhea  Eyes:      Extraocular Movements: Extraocular movements intact  Cardiovascular:      Rate and Rhythm: Normal rate and regular rhythm  Pulses: Normal pulses  Heart sounds: Normal heart sounds  Pulmonary:      Effort: Pulmonary effort is normal       Breath sounds: Normal breath sounds  Abdominal:      General: Abdomen is flat   Bowel sounds are normal  Palpations: Abdomen is soft  Musculoskeletal:         General: Normal range of motion  Cervical back: Normal range of motion  Skin:     Capillary Refill: Capillary refill takes less than 2 seconds  Comments: Atypical mole noted on top of the scalp and few noted in the back   Neurological:      Mental Status: She is alert and oriented to person, place, and time  Psychiatric:         Mood and Affect: Mood normal          Behavior: Behavior normal             Contains abnormal data COMPREHENSIVE METABOLIC PANEL  Order: [id number]   Ref Range & Units 1/20/22  8:24 AM   Glucose 65 - 99 mg/dL 105 High     Comment:               Fasting reference interval     For someone without known diabetes, a glucose value   between 100 and 125 mg/dL is consistent with   prediabetes and should be confirmed with a   follow-up test    BUN 7 - 25 mg/dL 20    Creatinine 0 50 - 0 99 mg/dL 0 85    Comment: For patients >52years of age, the reference limit   for Creatinine is approximately 13% higher for people   identified as -American     eGFR, Non-African American > OR = 60 mL/min/1 73m2 72    eGFR, African American > OR = 60 mL/min/1 73m2 84    BUN/Creatinine Ratio 6 - 22 (calc) NOT APPLICABLE    Sodium 447 - 146 mmol/L 139    Potassium 3 5 - 5 3 mmol/L 4 0    Chloride 98 - 110 mmol/L 106    Carbon Dioxide 20 - 32 mmol/L 25    Calcium 8 6 - 10 4 mg/dL 9 5    Protein, Total 6 1 - 8 1 g/dL 7 3    Albumin 3 6 - 5 1 g/dL 4 5    Globulin 1 9 - 3 7 g/dL (calc) 2 8    Albumin/Globulin Ratio 1 0 - 2 5 (calc) 1 6    Bilirubin, Total 0 2 - 1 2 mg/dL 0 3    Alkaline Phosphatase 37 - 153 U/L 73    AST 10 - 35 U/L 17    ALT 6 - 29 U/L 16      Narrative    FASTING:YES     FASTING: YES  Specimen Collected: 01/20/22  8:24 AM Last Resulted: 01/20/22 10:39 PM   Received From: [facility] [address]  Result Received: 01/28/22  8:56 AM    View Encounter     Received Information    Contains abnormal data HEMOGLOBIN A1C  Order: [id number]   Status: Final result     Next appt: 06/03/2022 at 10:45 AM in Otolaryngology Jaquan Valdez MD)      Component Ref Range & Units 1/20/22  8:24 AM 2/25/21  9:34 AM 10/29/20 12:34 PM 6/26/20  1:13 PM 2/27/20 11:41 AM 10/25/19 11:54 AM 6/11/18 11:09 AM   Hgb A1c <5 7 % of total Hgb 6 6 High   6 7 High  CM  6 7 High  CM  6 5 High  CM          LIPID PANEL  Order: [id number]   Ref Range & Units 1/20/22  8:24 AM   Cholesterol <200 mg/dL 173    Cholesterol, HDL, Direct > OR = 50 mg/dL 56    Triglyceride <150 mg/dL 159 High     Cholesterol, LDL, Calculated mg/dL (calc) 91    Comment: Reference range: <100     Desirable range <100 mg/dL for primary prevention;     <70 mg/dL for patients with CHD or diabetic patients   with > or = 2 CHD risk factors  LDL-C is now calculated using the Taras-Carvalho   calculation, which is a validated novel method providing   better accuracy than the Friedewald equation in the   estimation of LDL-C  Shy Quan et al  [facility]  9908;139(33): 5017-7439   (http://[employer] [place]/faq/BQO589)   CHOL/HDL Ratio <5 0 (calc) 3 1    Cholesterol, Non-HDL <130 mg/dL (calc) 117    Comment: For patients with diabetes plus 1 major ASCVD risk   factor, treating to a non-HDL-C goal of <100 mg/dL   (LDL-C of <70 mg/dL) is considered a therapeutic   option       Narrative    FASTING:YES     FASTING: YES  Specimen Collected: 01/20/22  8:24 AM Last Resulted: 01/20/22 10:39 PM   Received From: [address] [place] [address]  Result Received: 04/12/22     VITAMIN D, 25 - [place]  Order: [id number]   Ref Range & Units 1/20/22  8:24 AM   Vitamin D, 25-OH 30 - 100 ng/mL 96    Comment: Vitamin D Status         25-OH Vitamin D:     Deficiency:                    <20 ng/mL   Insufficiency:             20 - 29 ng/mL   Optimal:                 > or = 30 ng/mL      TSH, 3RD GENERATION  Order: [id number]   Ref Range & Units 1/20/22  8:24 AM   TSH 0 40 - 4 50 mIU/L 2 10      Narrative    FASTING:YES FASTING: YES  Contains abnormal data ALBUMIN / CREATININE URINE RATIO  Order: [id number]   Ref Range & Units 1/20/22  8:24 AM   Creatinine, Random Urine 20 - 275 mg/dL 74    Albumin, Urine See Note: mg/dL 7 8    Comment: Reference Range:     Reference Range   Not established   Albumin/Creatinine Ratio, Urine <30 mcg/mg creat 105 High     Comment:    The ADA defines abnormalities in albumin   excretion as follows:      CBC AND DIFFERENTIAL  Order: [id number]   Ref Range & Units 1/20/22  8:24 AM   WBC 3 8 - 10 8 Thousand/uL 5 7    RBC 3 80 - 5 10 Million/uL 5 10    Hemoglobin 11 7 - 15 5 g/dL 14 3    Hematocrit 35 0 - 45 0 % 43 6    MCV 80 0 - 100 0 fL 85 5    MCH 27 0 - 33 0 pg 28 0    MCHC 32 0 - 36 0 g/dL 32 8    RDW 11 0 - 15 0 % 14 3    Platelet Count 146 - 400 Thousand/uL 267    MPV 7 5 - 12 5 fL 10 2    Absolute Neutrophils 1500 - 7800 cells/uL 2280    Absolute Lymphocytes 850 - 3900 cells/uL 2696    Absolute Monocytes 200 - 950 cells/uL 513    Absolute Eosinophils 15 - 500 cells/uL 182    Absolute Basophils 0 - 200 cells/uL 29    Neutrophils % 40    Lymphocytes % 47 3    Monocytes % 9 0    Eosinophils % 3 2    Basophils % 0 5      Narrative    FASTING:YES     FASTING: YES  Specimen Collected: 01/20/22  8:24 AM Last Resulted: 01/20/22 10:39 PM   Received From: [facility]  Result Received: 01/28/22  8:56 AM    View Encounter     Received Information      Ordered On 1/20/2022  8:24 AM    Ordering Provider Authorizing Provider Ordering User Ordering Department   Generic External Data Provider Generic External Data Provider System Default User GENERIC EXTERNAL DATA DEP

## 2022-04-18 NOTE — PATIENT INSTRUCTIONS
Chronic Kidney Disease Diet   AMBULATORY CARE:   A chronic kidney disease (CKD) diet  limits protein, phosphorus, sodium, and potassium  Liquids may also need to be limited in later stages of CKD  This diet can help slow down the rate of damage to your kidneys  Your diet may change over time as your health condition changes  You may also need to make other diet changes if you have other health problems, such as diabetes  Call your nephrologist or dietitian if:   · You are gaining or losing weight very quickly  · You have shortness of breath  · You have nausea and are vomiting  · You feel very weak and tired  · You are having trouble following the CKD diet  Diet changes: There are 5 stages of CKD  The diet changes you need to make are based on your stage of kidney disease  Work with your dietitian or healthcare provider to plan meals that are right for you  You may need any of the following:  · Limit protein  in all stages of kidney disease  Limit the portion sizes of protein you eat to limit the amount of work your kidneys have to do  Foods that are high in protein are meat, poultry (chicken and turkey), fish, eggs, and dairy (milk, cheese, yogurt)  Your healthcare provider will tell you how much protein to eat each day  · Limit sodium  as directed  You may need to limit sodium to less than 2,300 milligrams (mg) each day  Ask your dietitian or healthcare provider how much sodium you can have each day  The amount depends on your stage of kidney disease  Table salt, canned foods, soups, salted snacks, and processed meats, like deli meats and sausage, are high in sodium  · Limit the amount of phosphorus  you eat  Your kidneys cannot get rid of extra phosphorus that builds up in your blood  This may cause your bones to lose calcium and weaken  Foods that are high in phosphorus are dairy products, beans, peas, nuts, and whole grains  Phosphorus is also found in cocoa, beer, and cola drinks  Your healthcare provider will tell you how much phosphorus you can have each day  · Limit potassium  if your potassium blood levels are too high  Your dietitian or healthcare provider will tell you if you need to limit potassium  Potassium is found in fruits and vegetables  · Limit liquids  as directed  Your healthcare provider may recommend that you limit liquids in stages 4 and 5 of kidney disease  If your body retains fluids, you will have swelling and fluid may build up in your lungs  This can cause other health problems, such as shortness of breath  Foods you may include: Your dietitian will tell you how many servings you can have from each of the food groups below  The approximate amount of these nutrients is listed next to each food group  Read the food label to find the exact amount  · Bread, cereal, and grains: These foods contain about 80 calories, 2 grams (g) of protein, 150 mg of sodium, 50 mg of potassium, and 30 mg of phosphorus  ? 1 slice (1 ounce) of bread (Western Sherry, [place], raisin, light rye, or sourdough white), small dinner roll, or 6-inch tortilla    ? ½ of a hamburger bun, hot dog bun, or English muffin or ¼ of a bagel    ? 1 cup of unsweetened cereal or ½ cup of cooked cereal, such as cream of wheat    ? ? cup of cooked pasta (noodles, macaroni, or spaghetti) or rice    ? 4 (2-inch) unsalted crackers or 3 squares of shaina crackers    ? 3 cups of air-popped, unsalted popcorn    ? ¾ ounce of unsalted pretzels    · Vegetables:  A serving of these foods contains about 30 calories, 2 g of protein, 50 mg of sodium, and 50 mg of phosphorus  ? Low potassium (less than 150 mg):      § ½ cup cooked green beans, cabbage, cauliflower, beets, or corn    § 1 cup raw cucumber, endive, alfalfa sprouts, cabbage, cauliflower, or watercress    § 1 cup of all types of lettuce    § ¼ cup cooked or ½ cup raw mushrooms or onions    § 1 cup cooked eggplant    ?  Medium potassium (150 to 250 mg): § 1 cup raw broccoli, celery, or zucchini    § ½ cup cooked asparagus, broccoli, celery, green peas, summer squash, zucchini, or peppers    § 1 cup cooked kale or turnips    · Fruits:  A serving of these foods contains about 60 calories, 0 g protein, 0 mg sodium, and 150 mg of phosphorus  Each serving is ½ cup, unless another amount is given  ? Low potassium (less than 150 mg): § Apple juice, applesauce, or 1 small apple    § Blueberries    § Cranberries or cranberry juice cocktail    § Fresh or canned pears (light syrup or packed in water)    § Grapes or grape juice    § Canned peaches (light syrup or packed in water)    § Pineapple or strawberries    § 1 tangerine    § Watermelon    ? Medium potassium (150 to 250 mg):      § Fresh peaches or pears    § Cherries    § Cantaloupe, jorden, or papaya    § Grapefruit or grapefruit juice    · Meat, poultry, and fish: These foods have about 75 calories, 7 g of protein, an average of 65 mg of sodium, 115 mg of potassium, and 70 mg of phosphorus  Do not use salt to prepare these foods  ? 1 ounce of cooked beef, pork, or poultry    ? 1 ounce of any fresh or frozen fish, lobster, shrimp, crab, clams, tuna, unsalted canned salmon, or unsalted sardines    · Other protein foods: These foods have about 90 calories, 7 g of protein, an average of 100 mg of sodium, 100 mg of potassium, and 120 mg of phosphorus  ? 1 large whole egg or ¼ cup of low-cholesterol egg substitute    ? 1 ounce of cheese    ? ¼ cup of cottage cheese or tofu    ? 1 ounce of unsalted nuts or 2 tablespoons of peanut butter    · Fats: These foods have very little protein and about 45 calories, 55 mg of sodium, 10 mg of potassium, and 5 mg of phosphorus  Include healthy fats, such as unsaturated fats, which are listed below  ? 1 teaspoon margarine or mayonnaise    ? 1 teaspoon oil (safflower, sunflower, corn, soybean, olive, peanut, canola)    ?  1 tablespoon oil-based salad dressing (such as [facility]) or 2 tablespoons mayonnaise-based salad dressing (such as ranch)    Foods to limit or avoid:   · Starches: The following foods have higher amounts of sodium, potassium, or phosphorus  ? Biscuits, muffins, pancakes, and waffles    ? Cake and cornbread from boxed mixes    ? Oatmeal and whole-wheat cereals    ? Salted pretzel sticks or rings and sandwich cookies    · Meat and protein foods: The following are high in sodium and phosphorus  ? Deli-style meat, such as roast beef, ham, and turkey    ? Canned salmon and sardines    ? Processed cheese, such as American cheese and cheese spreads    ? Smoked or cured meat, such as corned beef, perez, ham, hot dogs, and sausage    · Legumes: These foods have about 90 calories, 6 g of protein, less than 10 mg of sodium, 250 mg of potassium, and 100 mg of phosphorus  ? ? cup of black beans, red kidney beans, black-eye peas, garbanzos, and lentils    ? ¼ cup of green or mature soybeans    · Dairy: The following foods have about 8 g of protein, an average of 120 mg of sodium, 350 mg of potassium, and 220 mg of phosphorus  ? 1 cup of milk (fat-free, low-fat, whole, buttermilk, or chocolate milk)    ? 1 cup of low-fat plain or sugar-free yogurt or ice cream    ? ½ cup of pudding or custard    ? Nondairy milk substitutes: These foods have 75 calories, 1 gram of protein, and an average of 40 mg of sodium, 60 mg of potassium, and 60 mg of phosphorus  A serving is ½ cup of almond, rice, or soy milk, or nondairy creamer  · Vegetables: The following vegetables are high in potassium  Each serving has more than 250 mg of potassium  A serving is ½ cup, unless another amount is given  ? Artichoke or ¼ of a medium avocado    ? May sprouts, beets, chard, alessia or mustard greens    ? Potatoes, sweet potatoes, pumpkin, and yams    ? ¾ cup of okra    ? Raw tomatoes and low-sodium tomato juice, or tomato sauce    ?  Winter squash, cooked asparagus, and cooked spinach    · Fruit:  The following fruits are high in potassium  Each serving has more than 250 mg of potassium  ? 3 fresh apricots    ? 1 small nectarine (2 inches across)    ? 1 small orange and ½ cup of orange juice    ? ¼ cup of dates    ? ? of a small honeydew melon    ? 1 six-inch banana    ? ½ cup of prune juice or prunes and kiwifruit    · Fats:  Limit unhealthy fats, such as saturated fats, which are listed below  ? Butter, lard, cream cheese, whipped cream, and sour cream    ? Powdered coffee creamer    · Other: The following foods are high in sodium  ? Frozen dinners, soups, and fast foods, such as hamburgers and pizza (see the food label for serving sizes)    ? Table salt and seasoned salts, such as onion or garlic salt    ? Barbecue sauce, ketchup, mustard, soy sauce, steak sauce, and teriyaki sauce    Follow up with your nephrologist or dietitian as directed:  Write down your questions so you remember to ask them during your visits  © Copyright [employer] 2022 Information is for End User's use only and may not be sold, redistributed or otherwise used for commercial purposes  All illustrations and images included in CareNotes® are the copyrighted property of A D A M , Inc  or [facility] [employer]   The above information is an  only  It is not intended as medical advice for individual conditions or treatments  Talk to your doctor, nurse or pharmacist before following any medical regimen to see if it is safe and effective for you  Chronic Kidney Disease   WHAT YOU NEED TO KNOW:   Chronic kidney disease (CKD) is the gradual and permanent loss of kidney function  It is also called chronic kidney failure, or chronic renal insufficiency  Normally, the kidneys remove fluid, chemicals, and waste from your blood  These wastes are turned into urine by your kidneys  CKD may worsen over time and lead to kidney failure   Your CKD team will help you and your family plan for your care at home  The team will help you create goals and find ways to meet your goals  Your care plan may change over time as your needs change  DISCHARGE INSTRUCTIONS:   Call your local emergency number (911 in the [address]) if:   · You have a seizure  · You have shortness of breath  Return to the emergency department if:   · You are confused and very drowsy  Call your doctor or nephrologist if:   · You suddenly gain or lose more weight than your healthcare provider has told you is okay  · You have itchy skin or a rash  · You urinate more or less than you normally do  · You have blood in your urine  · You have nausea and are vomiting  · You have fatigue or muscle weakness  · You have hiccups that will not stop  · You have questions or concerns about your condition or care  Medicines:   · Medicines  may be given to decrease blood pressure and get rid of extra fluid  You may also receive medicine to manage health conditions that may occur with CKD, such as anemia, diabetes, and heart disease  · Take your medicine as directed  Contact your healthcare provider if you think your medicine is not helping or if you have side effects  Tell him or her if you are allergic to any medicine  Keep a list of the medicines, vitamins, and herbs you take  Include the amounts, and when and why you take them  Bring the list or the pill bottles to follow-up visits  Carry your medicine list with you in case of an emergency  What you can do to manage CKD: Management may include making some lifestyle changes  Tell your healthcare provider if you have any concerns about being able to make changes  He or she can help you find solutions, including working with specialists  Ask for help creating a plan to break large goals into smaller steps  Your plan may include any of the following:  · Manage other health conditions  Your healthcare provider will work with you to make a care plan that meets your needs  You will be checked regularly for heart disease or other conditions that can make CKD worse, such as diabetes  Your blood pressure will be closely monitored  You will also get a target blood pressure and help making a plan to reach your target  This may include taking your blood pressure at home  · Maintain a healthy weight  Your weight and body mass index (BMI) will be checked regularly  BMI helps find if your weight is healthy for your height  Your healthcare provider will use other tests to check your muscle and protein levels  Extra weight can strain your kidneys  A low weight or low muscle mass can make you feel more tired  You may have trouble doing your daily activities  Ask your provider what a healthy weight is for you  He or she can help you create a plan to lose or gain weight safely, if needed  The plan may include keeping a food diary  This is a list of foods and liquids you have each day  Your provider will use the diary to help you make changes, if needed  Changes are based on your health and any other conditions you have, such as diabetes  · Create an exercise plan  Regular exercise can help you manage CKD, high blood pressure, and diabetes  Exercise also helps control weight  Your provider can help you create exercise goals and a plan to reach those goals  For example, your goal may be to exercise for 30 minutes in a day  Your plan can include breaking exercise into 10 minute sessions, 3 times during the day  · Create a healthy eating plan  Your provider may tell you to eat food low in potassium, phosphorus, or protein  Your provider may also recommend vitamin or mineral supplements  Do not take any supplements without talking to your provider  A dietitian can help you plan meals if needed  Ask how much liquid to drink each day and which liquids are best for you  · Limit sodium (salt) as directed    You may need to limit sodium to less than 2,300 milligrams (mg) each day  Ask your dietitian or healthcare provider how much sodium you can have each day  The amount depends on your stage of kidney disease  Table salt, canned foods, soups, salted snacks, and processed meats, like deli meats and sausage, are high in sodium  Your provider or a dietitian can show you how to read food labels for sodium  · Limit alcohol as directed  Alcohol can cause fluid retention and can affect your kidneys  Ask how much alcohol is safe for you  A drink of alcohol is 12 ounces of beer, 5 ounces of wine, or 1½ ounces of liquor  · Do not smoke  Nicotine and other chemicals in cigarettes and cigars can cause kidney damage  Ask your provider for information if you currently smoke and need help to quit  E-cigarettes or smokeless tobacco still contain nicotine  Talk to your provider before you use these products  · Ask about over-the-counter medicines  Medicines such as NSAIDs and laxatives may harm your kidneys  Some cough and cold medicines can raise your blood pressure  Always ask if a medicine is safe before you take it  · Ask about vaccines you may need  CKD can increase your risk for infections such as pneumonia, influenza, and hepatitis  Vaccines lower your risk for infection  Your healthcare provider will tell you which vaccines you need and when to get them  Follow up with your doctor or nephrologist as directed: You will need to return for tests to monitor your kidney and nerve function, and your parathyroid hormone level  Your medicines may be changed, based on certain test results  Write down your questions so you remember to ask them during your visits  © Copyright [employer] 2022 Information is for End User's use only and may not be sold, redistributed or otherwise used for commercial purposes  All illustrations and images included in CareNotes® are the copyrighted property of [employer]  or [employer]   The above information is an  only   It is not intended as medical advice for individual conditions or treatments  Talk to your doctor, nurse or pharmacist before following any medical regimen to see if it is safe and effective for you

## 2022-05-24 LAB
LEFT EYE DIABETIC RETINOPATHY: NORMAL
RIGHT EYE DIABETIC RETINOPATHY: NORMAL

## 2022-06-21 ENCOUNTER — TELEPHONE (OUTPATIENT)
Dept: FAMILY MEDICINE CLINIC | Facility: CLINIC | Age: 67
End: 2022-06-21

## 2022-06-21 NOTE — TELEPHONE ENCOUNTER
Called patient and advised of providers location change to [place] ([facility]) going into effect in August  [facility] advising pt to call us back and advise if she would like to follow [place] to [place]) or if she would like to Est with another PCP in [facility] office

## 2022-08-18 ENCOUNTER — OFFICE VISIT (OUTPATIENT)
Dept: DERMATOLOGY | Facility: CLINIC | Age: 67
End: 2022-08-18
Payer: MEDICARE

## 2022-08-18 VITALS — HEIGHT: 67 IN | WEIGHT: 144 LBS | BODY MASS INDEX: 22.6 KG/M2

## 2022-08-18 DIAGNOSIS — L28.0 LICHEN SIMPLEX CHRONICUS: Primary | ICD-10-CM

## 2022-08-18 DIAGNOSIS — L65.9 HAIR LOSS: ICD-10-CM

## 2022-08-18 DIAGNOSIS — Z13.89 SCREENING FOR SKIN CONDITION: ICD-10-CM

## 2022-08-18 DIAGNOSIS — L81.5 GUTTATE HYPOMELANOSIS: ICD-10-CM

## 2022-08-18 PROCEDURE — 99214 OFFICE O/P EST MOD 30 MIN: CPT | Performed by: DERMATOLOGY

## 2022-08-18 NOTE — PATIENT INSTRUCTIONS
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

## 2022-08-18 NOTE — PROGRESS NOTES
[address]  [address] [facility] [address]  [phone]  [phone]     MRN: [id number] : 1955  Encounter: [id number]  Patient Information: Lakshmi Mcnair  Chief complaint:  Yearly checkup    History of present illness:  42-year-old female presents for overall skin check concerned regarding some areas of spot loss of pigmentation continues to have the irritation on legs which he does scratch at no other concerns noted area where she which she had loss of hair previously seems to have grown back no other concerns noted at this time  Past Medical History:   Diagnosis Date    Diabetes mellitus ([address] )     Ear problems     Tinnitus     [place] teeth extracted 2020     History reviewed  No pertinent surgical history  Social History   Social History     Substance and Sexual Activity   Alcohol Use No     Social History     Substance and Sexual Activity   Drug Use No     Social History     Tobacco Use   Smoking Status Former Smoker   Smokeless Tobacco Never Used     History reviewed  No pertinent family history  Meds/Allergies   No Known Allergies    Meds:  Prior to Admission medications    Medication Sig Start Date End Date Taking?  Authorizing Provider   [facility] Saline Nasal [place] 0 65 % nasal spray 1 SPRAY INTO EACH NOSTRIL AS NEEDED FOR CONGESTION OR RHINITIS FOR UP TO 10 DAYS 21  Yes MARSHALL Purdy   Ergocalciferol (VITAMIN D2 PO) 50,000 Units once a week  3/2/21  Yes Historical Provider, MD   fluticasone (FLONASE) 50 mcg/act nasal spray 2 sprays into each nostril daily 21  Yes Hermes Meyer MD   gabapentin (NEURONTIN) 100 mg capsule Take 100 mg by mouth as needed    Yes Historical Provider, MD   Glucose Blood (BLOOD GLUCOSE TEST STRIPS) STRP Check BG BID 3/2/21  Yes Historical Provider, MD   Glucose Blood (BLOOD GLUCOSE TEST STRIPS) STRP Use 2 (two) times a day 3/4/21  Yes Historical Provider, MD   ketoconazole (NIZORAL) 2 % shampoo Apply 1 application topically once a week Weekly 4/15/22 8/18/22 Yes MARSHALL Jansen   Lancets (OneTouch Delica Plus [id number]) MISC Use 2 (two) times a day 3/4/21  Yes Historical Provider, MD   losartan (COZAAR) 50 mg tablet Take 50 mg by mouth daily 1/26/22  Yes Historical Provider, MD   ONE TOUCH ULTRA TEST test strip TEST 1-2 TIMES DAILY 4/30/18  Yes Historical Provider, MD   pravastatin (PRAVACHOL) 40 mg tablet Take 40 mg by mouth 3/2/21 8/18/22 Yes Historical Provider, MD   Dapagliflozin Propanediol 5 MG TABS Take 5 mg by mouth  Patient not taking: Reported on 8/18/2022 3/2/21   Historical Provider, MD       Subjective:     Review of Systems:    General: negative for - chills, fatigue, fever,  weight gain or weight loss  Psychological: negative for - anxiety, behavioral disorder, concentration difficulties, decreased libido, depression, irritability, memory difficulties, mood swings, sleep disturbances or suicidal ideation  ENT: negative for - hearing difficulties , nasal congestion, nasal discharge, oral lesions, sinus pain, sneezing, sore throat  Allergy and Immunology: negative for - hives, insect bite sensitivity,  Hematological and Lymphatic: negative for - bleeding problems, blood clots,bruising, swollen lymph nodes  Endocrine: negative for - hair pattern changes, hot flashes, malaise/lethargy, mood swings, palpitations, polydipsia/polyuria, skin changes, temperature intolerance or unexpected weight change  Respiratory: negative for - cough, hemoptysis, orthopnea, shortness of breath, or wheezing  Cardiovascular: negative for - chest pain, dyspnea on exertion, edema,  Gastrointestinal: negative for - abdominal pain, nausea/vomiting  Genito-Urinary: negative for - dysuria, incontinence, irregular/heavy menses or urinary frequency/urgency  Musculoskeletal: negative for - gait disturbance, joint pain, joint stiffness, joint swelling, muscle pain, muscular weakness  Dermatological:  As in HPI  Neurological: negative for confusion, dizziness, headaches, impaired coordination/balance, memory loss, numbness/tingling, seizures, speech problems, tremors or weakness       Objective:   Ht 5' 7" (1 702 m)   Wt 65 3 kg (144 lb)   BMI 22 55 kg/m²     Physical Exam:    General Appearance:    Alert, cooperative, no distress   Head:    Normocephalic, without obvious abnormality, atraumatic           Skin:   A full skin exam was performed including scalp, head scalp, eyes, ears, nose, lips, neck, chest, axilla, abdomen, back, buttocks, bilateral upper extremities, bilateral lower extremities, hands, feet, fingers, toes, fingernails, and toenails hypopigmented guttate macules the lower legs like lichenified area noted on the lower legs nothing else remarkable noted on exam     Assessment:     1  Lichen simplex chronicus     2  Hair loss     3  Guttate hypomelanosis     4  Screening for skin condition           Plan:   Lichen simplex chronicus patient advised to continue to use the topical steroid to get this process under control  Hair loss appears to be stable no real treatment needed this point continue with the ketoconazole shampoo  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

## 2022-08-30 ENCOUNTER — OFFICE VISIT (OUTPATIENT)
Dept: FAMILY MEDICINE CLINIC | Facility: CLINIC | Age: 67
End: 2022-08-30
Payer: COMMERCIAL

## 2022-08-30 VITALS
OXYGEN SATURATION: 96 % | HEIGHT: 67 IN | WEIGHT: 145 LBS | RESPIRATION RATE: 18 BRPM | HEART RATE: 82 BPM | BODY MASS INDEX: 22.76 KG/M2 | TEMPERATURE: 97.4 F | DIASTOLIC BLOOD PRESSURE: 68 MMHG | SYSTOLIC BLOOD PRESSURE: 120 MMHG

## 2022-08-30 DIAGNOSIS — N18.2 CKD (CHRONIC KIDNEY DISEASE) STAGE 2, GFR 60-89 ML/MIN: ICD-10-CM

## 2022-08-30 DIAGNOSIS — E11.9 TYPE 2 DIABETES MELLITUS WITHOUT COMPLICATION, WITHOUT LONG-TERM CURRENT USE OF INSULIN (HCC): Primary | ICD-10-CM

## 2022-08-30 DIAGNOSIS — E55.9 VITAMIN D DEFICIENCY: ICD-10-CM

## 2022-08-30 DIAGNOSIS — R00.2 PALPITATIONS: ICD-10-CM

## 2022-08-30 DIAGNOSIS — R32 URINARY INCONTINENCE, UNSPECIFIED TYPE: ICD-10-CM

## 2022-08-30 DIAGNOSIS — G62.9 POLYNEUROPATHY: ICD-10-CM

## 2022-08-30 DIAGNOSIS — E11.42 TYPE 2 DIABETES MELLITUS WITH DIABETIC POLYNEUROPATHY, WITHOUT LONG-TERM CURRENT USE OF INSULIN (HCC): ICD-10-CM

## 2022-08-30 DIAGNOSIS — N95.1 HOT FLASHES DUE TO MENOPAUSE: ICD-10-CM

## 2022-08-30 DIAGNOSIS — E78.00 PURE HYPERCHOLESTEROLEMIA: ICD-10-CM

## 2022-08-30 DIAGNOSIS — G62.9 NEUROPATHY: ICD-10-CM

## 2022-08-30 DIAGNOSIS — E78.5 DYSLIPIDEMIA: ICD-10-CM

## 2022-08-30 DIAGNOSIS — N95.1 SWEATS, MENOPAUSAL: ICD-10-CM

## 2022-08-30 DIAGNOSIS — R53.82 CHRONIC FATIGUE: ICD-10-CM

## 2022-08-30 PROCEDURE — 99215 OFFICE O/P EST HI 40 MIN: CPT | Performed by: NURSE PRACTITIONER

## 2022-08-30 NOTE — PROGRESS NOTES
Assessment/Plan:     Page patient is a 61-year-old female presents in office for four-month follow-up on multiple issues  Underlying medical history of type 2 diabetes she has been stable  Underlying CKD stage 2 she is under care of Nephrology  Hypertension stable does see a cardiologist lately has been complaining of palpitations  Cholesterol manage by Pravachol 40 mg once a day  She has been using none is oral shampoo for bold spots on her head and that has been improving  Current complaints of multiple intermittent sweats not only night sweats different times of the day postmenopausal   Emotional   And has had intermittent palpitations  Excessively worried about her health an extensive family history of kidney issues so she is over is concerned about her kidneys but reinforce that she is in a great care of her nephrologist    Her diabetes is well managed and she is hydrating well she is active she is doing the best she can to maintain her health  In terms of sweats we will look into it a little bit further I am going to order some blood work and I have ordered a Holter monitor for palpitations and she is to follow-up with endocrinology  Discussed smoking cessation- discussed options she will review and think about and let me know what she would like to do at this point        Problem List Items Addressed This Visit        Endocrine    Type 2 diabetes mellitus with diabetic polyneuropathy, without long-term current use of insulin (HCC) - Primary       Nervous and Auditory    Neuropathy    Relevant Orders    Comprehensive metabolic panel    CBC and differential    TSH, 3rd generation    Lipid panel    UA (URINE) with reflex to Scope    HEMOGLOBIN A1C W/ EAG ESTIMATION    Vitamin D 25 hydroxy    Polyneuropathy    Relevant Orders    Comprehensive metabolic panel    CBC and differential    TSH, 3rd generation    Lipid panel    UA (URINE) with reflex to Scope    HEMOGLOBIN A1C W/ EAG ESTIMATION    Vitamin D 25 hydroxy       Genitourinary    CKD (chronic kidney disease) stage 2, GFR 60-89 ml/min    Relevant Orders    Comprehensive metabolic panel    CBC and differential    TSH, 3rd generation    Lipid panel    UA (URINE) with reflex to Scope    HEMOGLOBIN A1C W/ EAG ESTIMATION    Vitamin D 25 hydroxy       Other    Vitamin D deficiency    Relevant Orders    Comprehensive metabolic panel    CBC and differential    TSH, 3rd generation    Lipid panel    UA (URINE) with reflex to Scope    HEMOGLOBIN A1C W/ EAG ESTIMATION    Vitamin D 25 hydroxy    Dyslipidemia    Relevant Orders    Comprehensive metabolic panel    CBC and differential    TSH, 3rd generation    Lipid panel    UA (URINE) with reflex to Scope    HEMOGLOBIN A1C W/ EAG ESTIMATION    Vitamin D 25 hydroxy    Chronic fatigue    Relevant Orders    Comprehensive metabolic panel    CBC and differential    TSH, 3rd generation    Lipid panel    UA (URINE) with reflex to Scope    HEMOGLOBIN A1C W/ EAG ESTIMATION    Vitamin D 25 hydroxy    Pure hypercholesterolemia    Relevant Orders    Comprehensive metabolic panel    CBC and differential    TSH, 3rd generation    Lipid panel    UA (URINE) with reflex to Scope    HEMOGLOBIN A1C W/ EAG ESTIMATION    Vitamin D 25 hydroxy      Other Visit Diagnoses     Urinary incontinence, unspecified type        Relevant Orders    Ambulatory Referral to Urogynecology    Palpitations        Relevant Orders    Holter monitor    Hot flashes due to menopause        Sweats, menopausal                Subjective:      Patient ID: León Arias is a 74 y o  female  HPI    The following portions of the patient's history were reviewed and updated as appropriate:   Past Medical History:  She has a past medical history of Diabetes mellitus ([address] ), Ear problems, Tinnitus, and [place] teeth extracted (08/14/2020)  ,  _______________________________________________________________________  Medical Problems:  does not have any pertinent problems on file ,  _______________________________________________________________________  Past Surgical History:   has no past surgical history on file ,  _______________________________________________________________________  Family History:  family history is not on file ,  _______________________________________________________________________  Social History:   reports that she has quit smoking  She has never used smokeless tobacco  She reports that she does not drink alcohol and does not use drugs  ,  _______________________________________________________________________  Allergies:  has No Known Allergies     _______________________________________________________________________  Current Outpatient Medications   Medication Sig Dispense Refill    Dapagliflozin Propanediol 5 MG TABS Take 5 mg by mouth      Ergocalciferol (VITAMIN D2 PO) 50,000 Units once a week       fluticasone (FLONASE) 50 mcg/act nasal spray 2 sprays into each nostril daily 1 Bottle 5    Glucose Blood (BLOOD GLUCOSE TEST STRIPS) STRP Check BG BID      Glucose Blood (BLOOD GLUCOSE TEST STRIPS) STRP Use 2 (two) times a day      Lancets (OneTouch Delica Plus [id number]) MISC Use 2 (two) times a day      losartan (COZAAR) 50 mg tablet Take 50 mg by mouth daily      ketoconazole (NIZORAL) 2 % shampoo Apply 1 application topically once a week Weekly 240 mL 1    pravastatin (PRAVACHOL) 40 mg tablet Take 40 mg by mouth       No current facility-administered medications for this visit      _______________________________________________________________________  Review of Systems   Constitutional: Negative for chills, fatigue and fever  HENT: Negative for congestion, postnasal drip and sore throat  Was seen by ENT continues treatment for sinus congestion / inflammation    Eyes: Negative  Respiratory: Negative for cough and shortness of breath  Cardiovascular: Negative for chest pain, palpitations and leg swelling          Hypertension and hyperlipidemia stable   Gastrointestinal: Negative for abdominal distention, abdominal pain and nausea  Endocrine:        Type 2 diabetes   Under care of ENDO    Genitourinary: Negative for difficulty urinating and flank pain  Family history of kidney issues   CKD 2 her last GFR 84   Musculoskeletal: Negative for arthralgias and myalgias  Skin: Negative  Positive for hair loss - MUCH IMPROVED   Noted some atypical moles on her scalp and back needs to be evaluated by Dermatology   Allergic/Immunologic: Positive for environmental allergies  Neurological: Negative for dizziness and headaches  Hematological: Negative for adenopathy  Psychiatric/Behavioral: Negative for sleep disturbance and suicidal ideas  The patient is nervous/anxious (tearful in office - lots of stress at home  not feeling well - emotional support provided )  Objective:  Vitals:    08/30/22 1118   BP: 120/68   BP Location: Left arm   Patient Position: Sitting   Cuff Size: Adult   Pulse: 82   Resp: 18   Temp: (!) 97 4 °F (36 3 °C)   SpO2: 96%   Weight: 65 8 kg (145 lb)   Height: 5' 7" (1 702 m)     Body mass index is 22 71 kg/m²  Physical Exam  Vitals and nursing note reviewed  Constitutional:       Appearance: Normal appearance  Comments: BMI 22 71  Weight has been stable   HENT:      Head: Normocephalic and atraumatic  Nose: No congestion or rhinorrhea  Eyes:      Extraocular Movements: Extraocular movements intact  Cardiovascular:      Rate and Rhythm: Normal rate and regular rhythm  Pulses: Normal pulses  no weak pulses          Dorsalis pedis pulses are 2+ on the right side and 2+ on the left side  Posterior tibial pulses are 2+ on the right side and 2+ on the left side  Heart sounds: Normal heart sounds  Pulmonary:      Effort: Pulmonary effort is normal       Breath sounds: Normal breath sounds  Abdominal:      General: Abdomen is flat   Bowel sounds are normal       Palpations: Abdomen is soft  Musculoskeletal:         General: Normal range of motion  Cervical back: Normal range of motion  Feet:      Right foot:      Skin integrity: No ulcer, skin breakdown, erythema, warmth, callus or dry skin  Left foot:      Skin integrity: No ulcer, skin breakdown, erythema, warmth, callus or dry skin  Skin:     Capillary Refill: Capillary refill takes less than 2 seconds  Comments: Atypical mole noted on top of the scalp and few noted in the back   Neurological:      Mental Status: She is alert and oriented to person, place, and time  Psychiatric:         Mood and Affect: Mood normal          Behavior: Behavior normal          Depression Screening and Follow-up Plan: Patient was screened for depression during today's encounter  They screened negative with a PHQ-2 score of 1  Falls Plan of Care: balance, strength, and gait training instructions were provided  Recommended assistive device to help with gait and balance  Medications that increase falls were reviewed  Assessed feet and footwear  Vitamin D supplementation was recommended  Home safety education provided  Urinary Incontinence Plan of Care: counseling topics discussed: practice Kegel (pelvic floor strengthening) exercises, use restroom every 2 hours, limit alcohol, caffeine, spicy foods, and acidic foods, keeping a bladder diary, limiting fluid intake 3-4 hours before bed, preventing constipation, taking fluid pills at a time when you can get to bathroom easily, improving blood sugar control, limiting fluid intake to 60 oz  per day and bladder retraining  Referral was placed for Urogynecology  Refer to urogynecology  Diabetic Foot Exam    Patient's shoes and socks removed  Right Foot/Ankle   Right Foot Inspection  Skin Exam: skin normal and skin intact  No dry skin, no warmth, no callus, no erythema, no maceration, no abnormal color, no pre-ulcer, no ulcer and no callus  Toe Exam: ROM and strength within normal limits  Sensory   Monofilament testing: intact    Vascular  Capillary refills: < 3 seconds  The right DP pulse is 2+  The right PT pulse is 2+  Left Foot/Ankle  Left Foot Inspection  Skin Exam: skin normal and skin intact  No dry skin, no warmth, no erythema, no maceration, normal color, no pre-ulcer, no ulcer and no callus  Toe Exam: ROM and strength within normal limits  Sensory   Monofilament testing: intact    Vascular  Capillary refills: < 3 seconds  The left DP pulse is 2+  The left PT pulse is 2+  Assign Risk Category  No deformity present  No loss of protective sensation  No weak pulses  Risk: 0    I have spent 50 minutes with Patient  today in which greater than 50% of this time was spent in counseling/coordination of care regarding Diagnostic results, Prognosis, Risks and benefits of tx options, Intructions for management, Patient and family education, Importance of tx compliance, Risk factor reductions and Impressions

## 2022-08-30 NOTE — PROGRESS NOTES
Assessment/Plan:         Problem List Items Addressed This Visit        Endocrine    Type 2 diabetes mellitus with diabetic polyneuropathy, without long-term current use of insulin (HCC) - Primary       Nervous and Auditory    Neuropathy    Relevant Orders    Comprehensive metabolic panel    CBC and differential    TSH, 3rd generation    Lipid panel    UA (URINE) with reflex to Scope    HEMOGLOBIN A1C W/ EAG ESTIMATION    Vitamin D 25 hydroxy    Polyneuropathy    Relevant Orders    Comprehensive metabolic panel    CBC and differential    TSH, 3rd generation    Lipid panel    UA (URINE) with reflex to Scope    HEMOGLOBIN A1C W/ EAG ESTIMATION    Vitamin D 25 hydroxy       Genitourinary    CKD (chronic kidney disease) stage 2, GFR 60-89 ml/min    Relevant Orders    Comprehensive metabolic panel    CBC and differential    TSH, 3rd generation    Lipid panel    UA (URINE) with reflex to Scope    HEMOGLOBIN A1C W/ EAG ESTIMATION    Vitamin D 25 hydroxy       Other    Vitamin D deficiency    Relevant Orders    Comprehensive metabolic panel    CBC and differential    TSH, 3rd generation    Lipid panel    UA (URINE) with reflex to Scope    HEMOGLOBIN A1C W/ EAG ESTIMATION    Vitamin D 25 hydroxy    Dyslipidemia    Relevant Orders    Comprehensive metabolic panel    CBC and differential    TSH, 3rd generation    Lipid panel    UA (URINE) with reflex to Scope    HEMOGLOBIN A1C W/ EAG ESTIMATION    Vitamin D 25 hydroxy    Chronic fatigue    Relevant Orders    Comprehensive metabolic panel    CBC and differential    TSH, 3rd generation    Lipid panel    UA (URINE) with reflex to Scope    HEMOGLOBIN A1C W/ EAG ESTIMATION    Vitamin D 25 hydroxy    Pure hypercholesterolemia    Relevant Orders    Comprehensive metabolic panel    CBC and differential    TSH, 3rd generation    Lipid panel    UA (URINE) with reflex to Scope    HEMOGLOBIN A1C W/ EAG ESTIMATION    Vitamin D 25 hydroxy            Subjective:      Patient ID: Xiomara Velazquez Irwin Pascual is a 74 y o  female  HPI    The following portions of the patient's history were reviewed and updated as appropriate:   Past Medical History:  She has a past medical history of Diabetes mellitus ([address] ), Ear problems, Tinnitus, and [place] teeth extracted (08/14/2020)  ,  _______________________________________________________________________  Medical Problems:  does not have any pertinent problems on file ,  _______________________________________________________________________  Past Surgical History:   has no past surgical history on file ,  _______________________________________________________________________  Family History:  family history is not on file ,  _______________________________________________________________________  Social History:   reports that she has quit smoking  She has never used smokeless tobacco  She reports that she does not drink alcohol and does not use drugs  ,  _______________________________________________________________________  Allergies:  has No Known Allergies     _______________________________________________________________________  Current Outpatient Medications   Medication Sig Dispense Refill    Dapagliflozin Propanediol 5 MG TABS Take 5 mg by mouth      Ergocalciferol (VITAMIN D2 PO) 50,000 Units once a week       fluticasone (FLONASE) 50 mcg/act nasal spray 2 sprays into each nostril daily 1 Bottle 5    Glucose Blood (BLOOD GLUCOSE TEST STRIPS) STRP Check BG BID      Glucose Blood (BLOOD GLUCOSE TEST STRIPS) STRP Use 2 (two) times a day      Lancets (OneTouch Delica Plus [id number]) MISC Use 2 (two) times a day      losartan (COZAAR) 50 mg tablet Take 50 mg by mouth daily      [facility] Saline Nasal [place] 0 65 % nasal spray 1 SPRAY INTO EACH NOSTRIL AS NEEDED FOR CONGESTION OR RHINITIS FOR UP TO 10 DAYS (Patient not taking: Reported on 8/30/2022) 88 mL 1    ketoconazole (NIZORAL) 2 % shampoo Apply 1 application topically once a week Weekly 240 mL 1    pravastatin (PRAVACHOL) 40 mg tablet Take 40 mg by mouth       No current facility-administered medications for this visit      _______________________________________________________________________  Review of Systems      Objective:  Vitals:    08/30/22 1118   BP: 120/68   BP Location: Left arm   Patient Position: Sitting   Cuff Size: Adult   Pulse: 82   Resp: 18   Temp: (!) 97 4 °F (36 3 °C)   SpO2: 96%   Weight: 65 8 kg (145 lb)   Height: 5' 7" (1 702 m)     Body mass index is 22 71 kg/m²  Physical Exam         Diabetic Foot Exam    Patient's shoes and socks removed  Right Foot/Ankle   Right Foot Inspection  Skin Exam: skin normal and skin intact  No dry skin, no warmth, no callus, no erythema, no maceration, no abnormal color, no pre-ulcer, no ulcer and no callus  Toe Exam: ROM and strength within normal limits  Left Foot/Ankle  Left Foot Inspection  Skin Exam: skin normal and skin intact  No dry skin, no warmth, no erythema, no maceration, normal color, no pre-ulcer, no ulcer and no callus  Toe Exam: ROM and strength within normal limits       Assign Risk Category  No deformity present  No loss of protective sensation  No weak pulses  Risk: 0

## 2022-08-31 ENCOUNTER — TELEPHONE (OUTPATIENT)
Dept: ADMINISTRATIVE | Facility: OTHER | Age: 67
End: 2022-08-31

## 2022-08-31 NOTE — TELEPHONE ENCOUNTER
----- Message from Catrina Cooley sent at 8/30/2022 11:46 AM EDT -----  Regarding: Mammo and DEXA  08/30/22 11:47 AM    Hello, our patient Kayli Trinh has had her mammogram and DEXA scan completed/performed  Please assist in updating the patient chart  They were both done at [facility] and in [facility]  The mammogram date of service was 11/24/2021  The DEXA date of service was 11/23/2020       Thank you,  Caitlin Sturges  [facility]

## 2022-08-31 NOTE — TELEPHONE ENCOUNTER
Upon review of the In Basket request we were able to locate, review, and update the patient chart as requested for DEXA Scan and Mammogram     Any additional questions or concerns should be emailed to the Practice Liaisons via [email or web address] email, please do not reply via In Basket      Thank you  Nakita Haines MA

## 2022-09-15 ENCOUNTER — TELEPHONE (OUTPATIENT)
Dept: GASTROENTEROLOGY | Facility: CLINIC | Age: 67
End: 2022-09-15

## 2022-09-15 LAB — HBA1C MFR BLD HPLC: 6.7 %

## 2022-09-15 NOTE — TELEPHONE ENCOUNTER
Called patient to schedule office visit, patient has [facility], requires a office visit before scheduling colonoscopy  Patient would like to have Dr Devine Post do her colonoscopy  Patient will call the office is she changes her mind

## 2022-10-10 ENCOUNTER — TELEPHONE (OUTPATIENT)
Dept: FAMILY MEDICINE CLINIC | Facility: CLINIC | Age: 67
End: 2022-10-10

## 2022-10-11 NOTE — TELEPHONE ENCOUNTER
Last endo appt in chart was end of April  Called pt and lmom to see if she has been seen sooner and if it was at a different facility

## 2022-10-13 NOTE — TELEPHONE ENCOUNTER
Spoke with patient, note provided from office and given to [facility] and the [facility]  Medications were reviewed and patient was informed that it is OK for her to take the medications prescribed by the specialist  Any issues with cost, she needed to contact their office  Patient verbally understood

## 2022-10-21 RX ORDER — FINERENONE 20 MG/1
1 TABLET, FILM COATED ORAL EVERY MORNING
COMMUNITY
Start: 2022-09-27

## 2022-10-21 RX ORDER — DAPAGLIFLOZIN AND METFORMIN HYDROCHLORIDE 10; 1000 MG/1; MG/1
TABLET, FILM COATED, EXTENDED RELEASE ORAL
COMMUNITY
Start: 2022-09-28

## 2022-10-26 ENCOUNTER — CONSULT (OUTPATIENT)
Dept: GASTROENTEROLOGY | Facility: CLINIC | Age: 67
End: 2022-10-26
Payer: MEDICARE

## 2022-10-26 VITALS
WEIGHT: 144 LBS | DIASTOLIC BLOOD PRESSURE: 64 MMHG | BODY MASS INDEX: 22.6 KG/M2 | OXYGEN SATURATION: 98 % | SYSTOLIC BLOOD PRESSURE: 108 MMHG | HEIGHT: 67 IN | HEART RATE: 86 BPM

## 2022-10-26 DIAGNOSIS — Z86.010 HISTORY OF COLON POLYPS: ICD-10-CM

## 2022-10-26 DIAGNOSIS — K57.90 DIVERTICULAR DISEASE: Primary | ICD-10-CM

## 2022-10-26 PROCEDURE — 99204 OFFICE O/P NEW MOD 45 MIN: CPT | Performed by: INTERNAL MEDICINE

## 2022-10-26 NOTE — PROGRESS NOTES
[facility] - Outpatient Consultation  Farideh Little 74 y o  female MRN: [id number]  Encounter: [id number]          ASSESSMENT AND PLAN:      1  History of colon polyps  - Ambulatory Referral to Gastroenterology    - Colonoscopy; Future    2  Diverticular disease  - Colonoscopy; Future    ______________________________________________________________________    HPI:  Shilpa Coley is a 37-year-old female with a known history of colon polyps  Her last colonoscopy was performed in 2016 with Dr Heyward Heimlich  She states she had at least 4 polyps identified at that time  She was supposed to have a repeat colonoscopy 5 years later but now 6 years of gone by without the colonoscopy performed  She denies abdominal pain, nausea, vomiting, heartburn, rectal bleeding, hematemesis, melena, diarrhea, constipation  She was also found to have diverticulosis and was told to avoid popcorn, nuts, seeds  There is no family history for colon cancer or for colon polyp  She denies any problems with her colonoscopy procedure performed 6 years ago  She had no problems with the anesthesia, the bowel prep, or the the actual procedure  REVIEW OF SYSTEMS:    CONSTITUTIONAL: Denies any fever, chills, rigors, and weight loss  HEENT: No earache or tinnitus  Denies hearing loss or visual disturbances  CARDIOVASCULAR: No chest pain or palpitations  RESPIRATORY: Denies any cough, hemoptysis, shortness of breath or dyspnea on exertion  GASTROINTESTINAL: As noted in the History of Present Illness  GENITOURINARY: No problems with urination  Denies any hematuria or dysuria  NEUROLOGIC: No dizziness or vertigo, denies headaches  MUSCULOSKELETAL: Denies any muscle or joint pain  SKIN: Denies skin rashes or itching  ENDOCRINE: Denies excessive thirst  Denies intolerance to heat or cold  PSYCHOSOCIAL: Denies depression or anxiety  Denies any recent memory loss         Historical Information   Past Medical History:   Diagnosis Date   • Diabetes mellitus [facility] )    • Ear problems    • Tinnitus    • [place] teeth extracted 08/14/2020     History reviewed  No pertinent surgical history  Social History   Social History     Substance and Sexual Activity   Alcohol Use No     Social History     Substance and Sexual Activity   Drug Use No     Social History     Tobacco Use   Smoking Status Former Smoker   Smokeless Tobacco Never Used     History reviewed  No pertinent family history  Meds/Allergies       Current Outpatient Medications:   •  Dapagliflozin Propanediol 5 MG TABS  •  Ergocalciferol (VITAMIN D2 PO)  •  fluticasone (FLONASE) 50 mcg/act nasal spray  •  Glucose Blood (BLOOD GLUCOSE TEST STRIPS) STRP  •  Glucose Blood (BLOOD GLUCOSE TEST STRIPS) STRP  •  Kerendia 20 MG TABS  •  ketoconazole (NIZORAL) 2 % shampoo  •  Lancets (OneTouch Delica Plus [id number]) MISC  •  losartan (COZAAR) 50 mg tablet  •  pravastatin (PRAVACHOL) 40 mg tablet  •  Xigduo XR  MG TB24    No Known Allergies        Objective     Blood pressure 108/64, pulse 86, height 5' 7" (1 702 m), weight 65 3 kg (144 lb), SpO2 98 %  Body mass index is 22 55 kg/m²  PHYSICAL EXAM:      General Appearance:   Alert, cooperative, no distress   HEENT:   Normocephalic, atraumatic, anicteric      Neck:  Supple, symmetrical, trachea midline   Lungs:   Clear to auscultation bilaterally; no rales, rhonchi or wheezing; respirations unlabored    Heart[de-identified]   Regular rate and rhythm; no murmur, rub, or gallop  Abdomen:   Soft, non-tender, non-distended; normal bowel sounds; no masses, no organomegaly    Genitalia:   Deferred    Rectal:   Deferred    Extremities:  No cyanosis, clubbing or edema    Pulses:  2+ and symmetric    Skin:  No jaundice, rashes, or lesions    Lymph nodes:  No palpable cervical lymphadenopathy        Lab Results:   No visits with results within 1 Day(s) from this visit     Latest known visit with results is:   Orders Only on 09/15/2022 Component Date Value   • Hemoglobin A1C 09/15/2022 6 7          Radiology Results:   No results found

## 2022-10-26 NOTE — PATIENT INSTRUCTIONS
Scheduled date of colonoscopy (as of today):1/13/23  Physician performing colonoscopy:Allen  Location of colonoscopy:[place]  Bowel prep reviewed with patient:Mis/Miralax  Instructions reviewed with patient by:Nagi marcum  Clearances:  none

## 2022-11-23 ENCOUNTER — TELEPHONE (OUTPATIENT)
Dept: ADMINISTRATIVE | Facility: OTHER | Age: 67
End: 2022-11-23

## 2022-11-23 NOTE — TELEPHONE ENCOUNTER
----- Message from Patel Anaya sent at 11/23/2022  9:16 AM EST -----  Regarding: KAREEN DIAZ DEXA SCAN  11/23/22 9:16 AM    Hello, our patient Juan Domínguez has had DEXA Scan completed/performed  Please assist in updating the patient chart by pulling the Care Everywhere (CE) document  The date of service is 11/22/2022       Thank you,  Patel Aanya  [facility]

## 2022-11-28 NOTE — TELEPHONE ENCOUNTER
Upon review of the In Basket request we were able to locate, review, and update the patient chart as requested for DEXA Scan  Any additional questions or concerns should be emailed to the Practice Liaisons via the appropriate education email address, please do not reply via In Basket      Thank you  Daryl Barnhart MA

## 2023-01-13 ENCOUNTER — HOSPITAL ENCOUNTER (OUTPATIENT)
Dept: GASTROENTEROLOGY | Facility: HOSPITAL | Age: 68
Setting detail: OUTPATIENT SURGERY
End: 2023-01-13
Attending: INTERNAL MEDICINE

## 2023-01-13 ENCOUNTER — ANESTHESIA EVENT (OUTPATIENT)
Dept: GASTROENTEROLOGY | Facility: HOSPITAL | Age: 68
End: 2023-01-13

## 2023-01-13 ENCOUNTER — ANESTHESIA (OUTPATIENT)
Dept: GASTROENTEROLOGY | Facility: HOSPITAL | Age: 68
End: 2023-01-13

## 2023-01-13 VITALS
RESPIRATION RATE: 18 BRPM | DIASTOLIC BLOOD PRESSURE: 62 MMHG | OXYGEN SATURATION: 99 % | WEIGHT: 145.72 LBS | SYSTOLIC BLOOD PRESSURE: 92 MMHG | HEART RATE: 86 BPM | HEIGHT: 67 IN | TEMPERATURE: 98.1 F | BODY MASS INDEX: 22.87 KG/M2

## 2023-01-13 DIAGNOSIS — Z86.010 HISTORY OF COLON POLYPS: ICD-10-CM

## 2023-01-13 DIAGNOSIS — K57.90 DIVERTICULAR DISEASE: ICD-10-CM

## 2023-01-13 LAB — GLUCOSE SERPL-MCNC: 117 MG/DL (ref 65–140)

## 2023-01-13 RX ORDER — LIDOCAINE HYDROCHLORIDE 10 MG/ML
INJECTION, SOLUTION EPIDURAL; INFILTRATION; INTRACAUDAL; PERINEURAL AS NEEDED
Status: DISCONTINUED | OUTPATIENT
Start: 2023-01-13 | End: 2023-01-13

## 2023-01-13 RX ORDER — PROPOFOL 10 MG/ML
INJECTION, EMULSION INTRAVENOUS AS NEEDED
Status: DISCONTINUED | OUTPATIENT
Start: 2023-01-13 | End: 2023-01-13

## 2023-01-13 RX ORDER — EPHEDRINE SULFATE 50 MG/ML
INJECTION INTRAVENOUS AS NEEDED
Status: DISCONTINUED | OUTPATIENT
Start: 2023-01-13 | End: 2023-01-13

## 2023-01-13 RX ORDER — SODIUM CHLORIDE, SODIUM LACTATE, POTASSIUM CHLORIDE, CALCIUM CHLORIDE 600; 310; 30; 20 MG/100ML; MG/100ML; MG/100ML; MG/100ML
INJECTION, SOLUTION INTRAVENOUS CONTINUOUS PRN
Status: DISCONTINUED | OUTPATIENT
Start: 2023-01-13 | End: 2023-01-13

## 2023-01-13 RX ADMIN — LIDOCAINE HYDROCHLORIDE 20 MG: 10 INJECTION, SOLUTION EPIDURAL; INFILTRATION; INTRACAUDAL; PERINEURAL at 08:27

## 2023-01-13 RX ADMIN — PROPOFOL 30 MG: 10 INJECTION, EMULSION INTRAVENOUS at 08:31

## 2023-01-13 RX ADMIN — PROPOFOL 20 MG: 10 INJECTION, EMULSION INTRAVENOUS at 08:39

## 2023-01-13 RX ADMIN — PROPOFOL 20 MG: 10 INJECTION, EMULSION INTRAVENOUS at 08:36

## 2023-01-13 RX ADMIN — PROPOFOL 20 MG: 10 INJECTION, EMULSION INTRAVENOUS at 08:33

## 2023-01-13 RX ADMIN — PROPOFOL 30 MG: 10 INJECTION, EMULSION INTRAVENOUS at 08:29

## 2023-01-13 RX ADMIN — PROPOFOL 20 MG: 10 INJECTION, EMULSION INTRAVENOUS at 08:45

## 2023-01-13 RX ADMIN — PROPOFOL 100 MG: 10 INJECTION, EMULSION INTRAVENOUS at 08:27

## 2023-01-13 RX ADMIN — PROPOFOL 20 MG: 10 INJECTION, EMULSION INTRAVENOUS at 08:42

## 2023-01-13 RX ADMIN — SODIUM CHLORIDE, SODIUM LACTATE, POTASSIUM CHLORIDE, AND CALCIUM CHLORIDE: .6; .31; .03; .02 INJECTION, SOLUTION INTRAVENOUS at 08:15

## 2023-01-13 RX ADMIN — EPHEDRINE SULFATE 5 MG: 50 INJECTION, SOLUTION INTRAVENOUS at 08:44

## 2023-01-13 NOTE — H&P
History and Physical -  [facility]  Rex Hutchinson 74 y o  female MRN: [id number]      HPI: Rex Hutchinson is a 74y o  year old female who presents for a history of colon polyps and diverticular disease      REVIEW OF SYSTEMS: Per the HPI, and otherwise unremarkable  Historical Information   Past Medical History:   Diagnosis Date   • Diabetes mellitus ([address] )    • Ear problems    • Tinnitus    • [place] teeth extracted 08/14/2020     History reviewed  No pertinent surgical history  Social History   Social History     Substance and Sexual Activity   Alcohol Use No     Social History     Substance and Sexual Activity   Drug Use No     Social History     Tobacco Use   Smoking Status Former   Smokeless Tobacco Never     History reviewed  No pertinent family history  Meds/Allergies     (Not in a hospital admission)      No Known Allergies    Objective     Blood pressure (!) 88/67, pulse 80, temperature (!) 97 °F (36 1 °C), temperature source Oral, resp  rate 18, height 5' 7" (1 702 m), weight 66 1 kg (145 lb 11 6 oz), SpO2 97 %  PHYSICAL EXAM    Gen: NAD  CV: RRR  CHEST: Clear  ABD: soft, NT/ND  EXT: no edema      ASSESSMENT/PLAN:  This is a 74y o  year old female here for colonoscopy, and she is stable and optimized for her procedure

## 2023-01-13 NOTE — ANESTHESIA PREPROCEDURE EVALUATION
Procedure:  COLONOSCOPY    Relevant Problems   CARDIO   (+) Pure hypercholesterolemia      ENDO   (+) Type 2 diabetes mellitus with diabetic polyneuropathy, without long-term current use of insulin (HCC)      /RENAL   (+) CKD (chronic kidney disease) stage 2, GFR 60-89 ml/min      Current Outpatient Medications   Medication Instructions   • Ergocalciferol (VITAMIN D2 PO) 50,000 Units, Weekly   • fluticasone (FLONASE) 50 mcg/act nasal spray 2 sprays, Nasal, Daily   • Glucose Blood (BLOOD GLUCOSE TEST STRIPS) STRP Check BG BID   • Glucose Blood (BLOOD GLUCOSE TEST STRIPS) STRP Does not apply, 2 times daily   • Kerendia 20 MG TABS 1 tablet, Oral, Every morning   • ketoconazole (NIZORAL) 2 % shampoo 1 application, Topical, Weekly, Weekly   • Lancets (OneTouch Delica Plus [id number]) MISC Does not apply, 2 times daily   • losartan (COZAAR) 50 mg, Oral, Daily   • pravastatin (PRAVACHOL) 40 mg, Oral   • Xigduo XR  MG TB24 No dose, route, or frequency recorded  Lab Results   Component Value Date    WBC 5 5 05/25/2021    HGB 14 7 05/25/2021    HCT 44 0 05/25/2021     05/25/2021    SODIUM 142 05/25/2021    K 4 1 05/25/2021     05/25/2021    CO2 25 05/25/2021    BUN 12 05/25/2021    CREATININE 0 90 05/25/2021    GLUC 151 (H) 05/25/2021    HGBA1C 6 7 (H) 09/15/2022     Lab Results   Component Value Date    AST 17 05/25/2021    ALT 16 05/25/2021    ALKPHOS 74 05/25/2021    TBILI 0 4 05/25/2021    ALB 4 2 05/25/2021    PROTIME 12 5 01/27/2019    PTT 29 01/27/2019    INR 0 94 01/27/2019     Stress test 4/12/22  •  Nuclear Findings: LVEF measures 63%  LV perfusion is normal    •  Nuclear Findings: Normal left ventricular systolic function   post-stress  LVEF measures 63%  •  ECG: Resting ECG demonstrates normal sinus rhythm  •  ECG: Stress ECG was negative for ischemia  •  Stress Test: A [place] protocol stress test was performed  Overall, the   patient's exercise capacity was average for their age   The patient   exercised for 8 min  Hemodynamics are adequate for diagnosis  Blood   pressure demonstrated a normal response and heart rate demonstrated a   blunted response to stress  The patient's heart rate recovery was normal              Physical Exam    Airway    Mallampati score: III         Dental   Comment: Denies loose/chipped teeth  , No notable dental hx     Cardiovascular  Rhythm: regular, Rate: normal, Cardiovascular exam normal    Pulmonary  Pulmonary exam normal Breath sounds clear to auscultation,     Other Findings        Anesthesia Plan  ASA Score- 3     Anesthesia Type- IV sedation with anesthesia with ASA Monitors  Additional Monitors:   Airway Plan:           Plan Factors-Exercise tolerance (METS): >4 METS  Chart reviewed  EKG reviewed  Existing labs reviewed  Patient summary reviewed  Patient is not a current smoker  Induction- intravenous  Postoperative Plan-     Informed Consent- Anesthetic plan and risks discussed with patient  I personally reviewed this patient with the CRNA  Discussed and agreed on the Anesthesia Plan with the CRNA  Donna Stratton

## 2023-01-13 NOTE — ANESTHESIA POSTPROCEDURE EVALUATION
Post-Op Assessment Note    CV Status:  Stable  Pain Score: 0    Pain management: adequate     Mental Status:  Arousable and sleepy   Hydration Status:  Euvolemic   PONV Controlled:  Controlled   Airway Patency:  Patent      Post Op Vitals Reviewed: Yes      Staff: CRNA         No notable events documented      BP  86/54   Temp     Pulse 76   Resp 18   SpO2 98% RA

## 2023-06-30 ENCOUNTER — HOSPITAL ENCOUNTER (OUTPATIENT)
Dept: PULMONOLOGY | Facility: HOSPITAL | Age: 68
End: 2023-06-30
Payer: MEDICARE

## 2023-06-30 DIAGNOSIS — Z72.0 TOBACCO USE: ICD-10-CM

## 2023-06-30 PROCEDURE — 94060 EVALUATION OF WHEEZING: CPT | Performed by: INTERNAL MEDICINE

## 2023-06-30 PROCEDURE — 94729 DIFFUSING CAPACITY: CPT | Performed by: INTERNAL MEDICINE

## 2023-06-30 PROCEDURE — 94760 N-INVAS EAR/PLS OXIMETRY 1: CPT

## 2023-06-30 PROCEDURE — 94726 PLETHYSMOGRAPHY LUNG VOLUMES: CPT | Performed by: INTERNAL MEDICINE

## 2023-06-30 PROCEDURE — 94060 EVALUATION OF WHEEZING: CPT

## 2023-06-30 PROCEDURE — 94726 PLETHYSMOGRAPHY LUNG VOLUMES: CPT

## 2023-06-30 PROCEDURE — 94729 DIFFUSING CAPACITY: CPT

## 2023-06-30 RX ORDER — ALBUTEROL SULFATE 2.5 MG/3ML
2.5 SOLUTION RESPIRATORY (INHALATION) ONCE
Status: COMPLETED | OUTPATIENT
Start: 2023-06-30 | End: 2023-06-30

## 2023-06-30 RX ADMIN — ALBUTEROL SULFATE 2.5 MG: 2.5 SOLUTION RESPIRATORY (INHALATION) at 09:24

## 2023-07-06 LAB
CREAT ?TM UR-SCNC: 85 UMOL/L
EXT PROTEIN URINE: 165
PROT/CREAT UR: 0.17 MG/G{CREAT}

## 2023-07-19 ENCOUNTER — OFFICE VISIT (OUTPATIENT)
Dept: FAMILY MEDICINE CLINIC | Facility: CLINIC | Age: 68
End: 2023-07-19
Payer: MEDICARE

## 2023-07-19 VITALS
TEMPERATURE: 97.3 F | SYSTOLIC BLOOD PRESSURE: 110 MMHG | BODY MASS INDEX: 21.35 KG/M2 | WEIGHT: 136 LBS | OXYGEN SATURATION: 94 % | HEART RATE: 75 BPM | DIASTOLIC BLOOD PRESSURE: 62 MMHG | HEIGHT: 67 IN

## 2023-07-19 DIAGNOSIS — L20.84 INTRINSIC ATOPIC DERMATITIS: ICD-10-CM

## 2023-07-19 DIAGNOSIS — I10 ESSENTIAL HYPERTENSION: Primary | ICD-10-CM

## 2023-07-19 DIAGNOSIS — E78.00 PURE HYPERCHOLESTEROLEMIA: ICD-10-CM

## 2023-07-19 DIAGNOSIS — R53.82 CHRONIC FATIGUE: ICD-10-CM

## 2023-07-19 DIAGNOSIS — E11.42 TYPE 2 DIABETES MELLITUS WITH DIABETIC POLYNEUROPATHY, WITHOUT LONG-TERM CURRENT USE OF INSULIN (HCC): ICD-10-CM

## 2023-07-19 DIAGNOSIS — E78.5 DYSLIPIDEMIA: ICD-10-CM

## 2023-07-19 DIAGNOSIS — N18.2 CKD (CHRONIC KIDNEY DISEASE) STAGE 2, GFR 60-89 ML/MIN: ICD-10-CM

## 2023-07-19 DIAGNOSIS — Z00.01 ENCOUNTER FOR GENERAL ADULT MEDICAL EXAMINATION WITH ABNORMAL FINDINGS: ICD-10-CM

## 2023-07-19 DIAGNOSIS — F41.9 ANXIETY: ICD-10-CM

## 2023-07-19 DIAGNOSIS — Z91.89 AT INCREASED RISK FOR STRESS: ICD-10-CM

## 2023-07-19 PROBLEM — Z78.9 MICROALBUMINURIA ABSENT: Status: ACTIVE | Noted: 2023-05-02

## 2023-07-19 PROBLEM — N81.6 RECTOCELE: Status: ACTIVE | Noted: 2023-02-16

## 2023-07-19 PROBLEM — N32.81 OAB (OVERACTIVE BLADDER): Status: ACTIVE | Noted: 2023-02-16

## 2023-07-19 PROBLEM — R71.8 ELEVATED HEMATOCRIT: Status: ACTIVE | Noted: 2022-09-27

## 2023-07-19 PROBLEM — N39.46 MIXED INCONTINENCE: Status: ACTIVE | Noted: 2023-02-16

## 2023-07-19 PROCEDURE — G0402 INITIAL PREVENTIVE EXAM: HCPCS | Performed by: NURSE PRACTITIONER

## 2023-07-19 PROCEDURE — 99173 VISUAL ACUITY SCREEN: CPT | Performed by: NURSE PRACTITIONER

## 2023-07-19 RX ORDER — LOSARTAN POTASSIUM 50 MG/1
50 TABLET ORAL DAILY
Qty: 90 TABLET | Refills: 1 | Status: SHIPPED | OUTPATIENT
Start: 2023-07-19 | End: 2023-10-17

## 2023-07-19 RX ORDER — BUPROPION HYDROCHLORIDE 75 MG/1
75 TABLET ORAL DAILY
Qty: 30 TABLET | Refills: 0 | Status: SHIPPED | OUTPATIENT
Start: 2023-07-19 | End: 2023-08-18

## 2023-07-19 RX ORDER — PRAVASTATIN SODIUM 40 MG
40 TABLET ORAL DAILY
Qty: 90 TABLET | Refills: 1 | Status: SHIPPED | OUTPATIENT
Start: 2023-07-19 | End: 2023-10-17

## 2023-07-19 RX ORDER — FINERENONE 20 MG/1
1 TABLET, FILM COATED ORAL EVERY MORNING
Qty: 90 TABLET | Refills: 1 | Status: SHIPPED | OUTPATIENT
Start: 2023-07-19 | End: 2023-10-17

## 2023-07-19 RX ORDER — DAPAGLIFLOZIN AND METFORMIN HYDROCHLORIDE 10; 1000 MG/1; MG/1
1 TABLET, FILM COATED, EXTENDED RELEASE ORAL DAILY
Qty: 90 TABLET | Refills: 1 | Status: SHIPPED | OUTPATIENT
Start: 2023-07-19 | End: 2023-10-17

## 2023-07-19 NOTE — PROGRESS NOTES
Depression Screening and Follow-up Plan: Patient was screened for depression during today's encounter. They screened negative with a PHQ-2 score of 2. Assessment/Plan:    Page patient is a 72-year-old female presents in office for four-month follow-up on multiple issues   Underlying medical history of type 2 diabetes she has been stable- sees ENDO and Nephrology   Underlying CKD stage 2 she is under care of Nephrology stable improved from last visit   Hypertension stable does see a cardiologist   Cholesterol manage by Pravachol 40 mg once a day  Current complaints of multiple intermittent sweats not only night sweats different times of the day postmenopausal.  Emotional.  And has had intermittent palpitations.   Excessively worried about her health an extensive family history of kidney issues so she is over is concerned about her kidneys but reinforce that she is in a great care of her nephrologist.   Her diabetes is well managed and she is hydrating well she is active she is doing the best she can to maintain her health. - discussed anxiety management   Discussed smoking cessation- discussed options she will review and think about and let me know what she would like to do at this point. - added Wellbutrin once a day for now  And will see how she tolerates      Problem List Items Addressed This Visit        Endocrine    Type 2 diabetes mellitus with diabetic polyneuropathy, without long-term current use of insulin (HCC)    Relevant Medications    Xigduo XR  MG TB24    losartan (COZAAR) 50 mg tablet    Kerendia 20 MG TABS    Other Relevant Orders    Comprehensive metabolic panel    CBC and differential    TSH, 3rd generation with Free T4 reflex    Lipid panel    UA w Reflex to Microscopic w Reflex to Culture -Lab Collect    HEMOGLOBIN A1C W/ EAG ESTIMATION       Cardiovascular and Mediastinum    Essential hypertension - Primary    Relevant Medications    losartan (COZAAR) 50 mg tablet    Other Relevant Orders    Comprehensive metabolic panel    CBC and differential    TSH, 3rd generation with Free T4 reflex    Lipid panel    UA w Reflex to Microscopic w Reflex to Culture -Lab Collect    HEMOGLOBIN A1C W/ EAG ESTIMATION       Musculoskeletal and Integument    Intrinsic atopic dermatitis    Relevant Orders    Comprehensive metabolic panel    CBC and differential    TSH, 3rd generation with Free T4 reflex    Lipid panel    UA w Reflex to Microscopic w Reflex to Culture -Lab Collect    HEMOGLOBIN A1C W/ EAG ESTIMATION       Genitourinary    CKD (chronic kidney disease) stage 2, GFR 60-89 ml/min    Relevant Medications    Xigduo XR  MG TB24    losartan (COZAAR) 50 mg tablet    Kerendia 20 MG TABS    Other Relevant Orders    Comprehensive metabolic panel    CBC and differential    TSH, 3rd generation with Free T4 reflex    Lipid panel    UA w Reflex to Microscopic w Reflex to Culture -Lab Collect    HEMOGLOBIN A1C W/ EAG ESTIMATION    Albumin / creatinine urine ratio       Other    Dyslipidemia    Relevant Medications    pravastatin (PRAVACHOL) 40 mg tablet    Other Relevant Orders    Comprehensive metabolic panel    CBC and differential    TSH, 3rd generation with Free T4 reflex    Lipid panel    UA w Reflex to Microscopic w Reflex to Culture -Lab Collect    HEMOGLOBIN A1C W/ EAG ESTIMATION    Chronic fatigue    Relevant Orders    Comprehensive metabolic panel    CBC and differential    TSH, 3rd generation with Free T4 reflex    Lipid panel    UA w Reflex to Microscopic w Reflex to Culture -Lab Collect    HEMOGLOBIN A1C W/ EAG ESTIMATION    Pure hypercholesterolemia    Relevant Medications    pravastatin (PRAVACHOL) 40 mg tablet    Other Relevant Orders    Comprehensive metabolic panel    CBC and differential    TSH, 3rd generation with Free T4 reflex    Lipid panel    UA w Reflex to Microscopic w Reflex to Culture -Lab Collect    HEMOGLOBIN A1C W/ EAG ESTIMATION   Other Visit Diagnoses     Encounter for general adult medical examination with abnormal findings        Relevant Orders    Comprehensive metabolic panel    CBC and differential    TSH, 3rd generation with Free T4 reflex    Lipid panel    UA w Reflex to Microscopic w Reflex to Culture -Lab Collect    HEMOGLOBIN A1C W/ EAG ESTIMATION    At increased risk for stress        Relevant Medications    buPROPion (WELLBUTRIN) 75 mg tablet    Anxiety        Relevant Medications    buPROPion (WELLBUTRIN) 75 mg tablet            Subjective:      Patient ID: Emre Zheng is a 74 y.o. female. Page patient is a 29-year-old female presents in office for four-month follow-up on multiple issues   Underlying medical history of type 2 diabetes she has been stable- sees ENDO and Nephrology   Underlying CKD stage 2 she is under care of Nephrology stable improved from last visit   Hypertension stable does see a cardiologist   Cholesterol manage by Pravachol 40 mg once a day  Current complaints of multiple intermittent sweats not only night sweats different times of the day postmenopausal.  Emotional.  And has had intermittent palpitations.   Excessively worried about her health an extensive family history of kidney issues so she is over is concerned about her kidneys but reinforce that she is in a great care of her nephrologist.   Her diabetes is well managed and she is hydrating well she is active she is doing the best she can to maintain her health. - discussed anxiety management   Discussed smoking cessation- discussed options she will review and think about and let me know what she would like to do at this point. - added Wellbutrin once a day for now  And will see how she tolerates       The following portions of the patient's history were reviewed and updated as appropriate:   Past Medical History:  She has a past medical history of Diabetes mellitus ([address]), Ear problems, Tinnitus, and [place] teeth extracted (08/14/2020). ,  _______________________________________________________________________  Medical Problems:  does not have any pertinent problems on file.,  _______________________________________________________________________  Past Surgical History:   has no past surgical history on file.,  _______________________________________________________________________  Family History:  family history is not on file.,  _______________________________________________________________________  Social History:   reports that she has been smoking cigarettes. She has been smoking an average of .25 packs per day. She has never used smokeless tobacco. She reports that she does not drink alcohol and does not use drugs. ,  _______________________________________________________________________  Allergies:  has No Known Allergies. .  _______________________________________________________________________  Current Outpatient Medications   Medication Sig Dispense Refill   • buPROPion (WELLBUTRIN) 75 mg tablet Take 1 tablet (75 mg total) by mouth in the morning 30 tablet 0   • fluticasone (FLONASE) 50 mcg/act nasal spray 2 sprays into each nostril daily 1 Bottle 5   • Glucose Blood (BLOOD GLUCOSE TEST STRIPS) STRP Check BG BID     • hydroquinone 4 % cream APPLY TO BOTH LEGS EVERY DAY     • Kerendia 20 MG TABS Take 1 tablet by mouth every morning Maintained by nephrology 90 tablet 1   • Lancets (OneTouch Delica Plus [id number]) MISC Use 2 (two) times a day     • losartan (COZAAR) 50 mg tablet Take 1 tablet (50 mg total) by mouth daily 90 tablet 1   • pravastatin (PRAVACHOL) 40 mg tablet Take 1 tablet (40 mg total) by mouth daily 90 tablet 1   • triamcinolone (KENALOG) 0.1 % cream APPLY TO AFFECTED AREA EVERY DAY     • Xigduo XR  MG TB24 Take 1 tablet by mouth in the morning 90 tablet 1   • ketoconazole (NIZORAL) 2 % shampoo Apply 1 application topically once a week Weekly 240 mL 1     No current facility-administered medications for this visit.     _______________________________________________________________________  Review of Systems   Constitutional: Negative for chills, fatigue and fever. HENT: Negative for congestion, postnasal drip and sore throat. Was seen by ENT continues treatment for sinus congestion / inflammation    Eyes: Negative. Respiratory: Negative for cough and shortness of breath. Cardiovascular: Negative for chest pain, palpitations and leg swelling. Hypertension and hyperlipidemia stable   Gastrointestinal: Negative for abdominal distention, abdominal pain and nausea. Endocrine:        Type 2 diabetes   Under care of ENDO    Genitourinary: Negative for difficulty urinating and flank pain. Family history of kidney issues   CKD 2 her last GFR 84   Musculoskeletal: Negative for arthralgias and myalgias. Skin: Negative. Positive for hair loss - MUCH IMPROVED   Noted some atypical moles on her scalp and back needs to be evaluated by Dermatology   Allergic/Immunologic: Positive for environmental allergies. Neurological: Negative for dizziness and headaches. Hematological: Negative for adenopathy. Psychiatric/Behavioral: Negative for sleep disturbance and suicidal ideas. The patient is nervous/anxious (tearful in office - lots of stress at home  not feeling well - emotional support provided ). Increased stress          Objective:  Vitals:    07/19/23 1520   BP: 110/62   BP Location: Right arm   Patient Position: Sitting   Cuff Size: Adult   Pulse: 75   Temp: (!) 97.3 °F (36.3 °C)   SpO2: 94%   Weight: 61.7 kg (136 lb)   Height: 5' 7" (1.702 m)     Body mass index is 21.3 kg/m². Physical Exam  Vitals and nursing note reviewed. Constitutional:       Appearance: Normal appearance. Comments: BMI 21.30   HENT:      Head: Normocephalic and atraumatic. Nose: No congestion or rhinorrhea. Eyes:      Extraocular Movements: Extraocular movements intact. Cardiovascular:      Rate and Rhythm: Normal rate and regular rhythm. Pulses: Normal pulses. Dorsalis pedis pulses are 2+ on the right side and 2+ on the left side. Posterior tibial pulses are 2+ on the right side and 2+ on the left side. Heart sounds: Normal heart sounds. Pulmonary:      Effort: Pulmonary effort is normal.      Breath sounds: Normal breath sounds. Abdominal:      General: Abdomen is flat. Bowel sounds are normal.      Palpations: Abdomen is soft. Musculoskeletal:         General: Normal range of motion. Cervical back: Normal range of motion. Feet:      Right foot:      Skin integrity: No ulcer, skin breakdown, erythema, warmth, callus or dry skin. Left foot:      Skin integrity: No ulcer, skin breakdown, erythema, warmth, callus or dry skin. Skin:     General: Skin is warm. Capillary Refill: Capillary refill takes less than 2 seconds. Comments: Atypical mole noted on top of the scalp and few noted in the back   Neurological:      Mental Status: She is alert and oriented to person, place, and time.       Comments: Denies any suicidal or homicidal ideations    Psychiatric:         Behavior: Behavior normal.      Comments: emotional   anxiety            Contains abnormal data HEMOGLOBIN A1C  Order: [id number]   Ref Range & Units 4/25/23  7:45 AM   Hgb A1c <5.7 % of total Hgb 5.9 High     Comment: For someone without known diabetes, a hemoglobin   A1c value between 5.7% and 6.4% is consistent with   prediabetes and should be confirmed with a     Contains abnormal data VITAMIN D, 25 - [place]  Order: [id number]   Ref Range & Units 4/25/23  7:45 AM   Vitamin D, 25-OH 30 - 100 ng/mL 104 High     Comment: Vitamin D Status         25-OH Vitamin D:     Deficiency:                    <20 ng/mL   Insufficiency:             20 - 29 ng/mL   Optimal:                 > or = 30 ng/mL     For 25-OH Vitamin D testing on patients on   D2-supplementation and patients for whom quantitation   of D2 and D3 fractions is required, the QuestAssureD(TM)   25-OH VIT D, (D2,D3), LC/MS/MS is recommended: order   code 63583 (patients >2yrs). See Note 1     Note 1     For additional information, please refer to   http://education. Ink361/faq/LJK267   (This link is being provided for informational/   educational purposes only.)   Narrative    FASTING:YES   AN UPDATE OR CORRECTION HAS BEEN MADE TO NAME     FASTING: YES    Specimen Collected: 04/25/23  7:45 AM    Performed by: Jyothi Johnson Last Resulted: 04/26/23  3:41 AM   Received From: [address] [place] [address]  Result Received: 06/05/23 10:22 AM     COMPREHENSIVE METABOLIC PANEL  Order: [id number]   Ref Range & Units 4/25/23  7:45 AM   Glucose 65 - 99 mg/dL 91    Comment:               Fasting reference interval   BUN 7 - 25 mg/dL 12    Creatinine 0.50 - 1.05 mg/dL 0.87    eGFRcr > OR = 60 mL/min/1.73m2 74    Comment: The eGFR is based on the CKD-EPI 2021 equation. To calculate   the new eGFR from a previous Creatinine or Cystatin C   result, go to [email or web address]   [email or web address]   BUN/Creatinine Ratio 6 - 22 (calc) NOT APPLICABLE    Sodium 169 - 146 mmol/L 141    Potassium 3.5 - 5.3 mmol/L 4.1    Chloride 98 - 110 mmol/L 109    Carbon Dioxide 20 - 32 mmol/L 23    Calcium 8.6 - 10.4 mg/dL 9.4    Protein, Total 6.1 - 8.1 g/dL 7.0    Albumin 3.6 - 5.1 g/dL 4.5    Globulin 1.9 - 3.7 g/dL (calc) 2.5    Albumin/Globulin Ratio 1.0 - 2.5 (calc) 1.8    Bilirubin, Total 0.2 - 1.2 mg/dL 0.4    Alkaline Phosphatase 37 - 153 U/L 64    AST 10 - 35 U/L 17    ALT 6 - 29 U/L 15    Narrative    FASTING:YES   AN UPDATE OR CORRECTION HAS BEEN MADE TO NAME     FASTING: YES    Specimen Collected: 04/25/23  7:45 AM    Performed by: Jyothi Johnson Last Resulted: 04/26/23  3:41 AM   Received From: [facility]  Result Received: 06/05/23 10:22 AM   LIPID PANEL  Order: [id number]   Ref Range & Units 4/25/23  7:45 AM Cholesterol <200 mg/dL 165    Cholesterol, HDL, Direct > OR = 50 mg/dL 64    Triglyceride <150 mg/dL 80    Cholesterol, LDL, Calculated mg/dL (calc) 84    Comment: Reference range: <100

## 2023-07-19 NOTE — PROGRESS NOTES
Diabetic Foot Exam    Patient's shoes and socks removed. Right Foot/Ankle   Right Foot Inspection  Skin Exam: skin normal and skin intact. No dry skin, no warmth, no callus, no erythema, no maceration, no abnormal color, no pre-ulcer, no ulcer and no callus. Toe Exam: ROM and strength within normal limits. Sensory   Vibration: intact  Proprioception: intact  Monofilament testing: intact    Left Foot/Ankle  Left Foot Inspection  Skin Exam: skin normal and skin intact. No dry skin, no warmth, no erythema, no maceration, normal color, no pre-ulcer, no ulcer and no callus. Toe Exam: ROM and strength within normal limits.      Sensory   Vibration: intact  Proprioception: intact  Monofilament testing: intact    Assign Risk Category  No deformity present  No loss of protective sensation  No weak pulses  Risk: 0

## 2023-07-20 ENCOUNTER — TELEPHONE (OUTPATIENT)
Dept: ADMINISTRATIVE | Facility: OTHER | Age: 68
End: 2023-07-20

## 2023-07-20 NOTE — TELEPHONE ENCOUNTER
Upon review of the In Basket request we were able to locate, review, and update the patient chart as requested for Mammogram.    Any additional questions or concerns should be emailed to the Practice Liaisons via the appropriate education email address, please do not reply via In Basket.     Thank you  Sukhi Kingsley

## 2023-07-20 NOTE — TELEPHONE ENCOUNTER
----- Message from Chalino Marcos sent at 7/19/2023  4:05 PM EDT -----  Regarding: mammo  07/19/23 4:06 PM    Hello, our patient Paddy Lynn has had her mammogram completed/performed. Please assist in updating the patient chart. It is in care everywhere and was done at [facility].  Date of service is 4/5/2023    Thank you,  Caitlin Sturges  [facility]

## 2023-07-21 ENCOUNTER — TELEPHONE (OUTPATIENT)
Dept: ADMINISTRATIVE | Facility: OTHER | Age: 68
End: 2023-07-21

## 2023-07-21 NOTE — TELEPHONE ENCOUNTER
----- Message from Keyshawn Jennifer sent at 7/21/2023  6:58 AM EDT -----  Regarding: Urine Alb/ Creat Ratio  Hello, our patient Opal Nina has had her urine creatinine ratio completed/performed. Please assist in updating the patient chart. It is in care everywhere. It was done with [facility]. The date of service is 7/6/2023.      Thank you,   Caitlin Sturges [facility] [place]

## 2023-07-21 NOTE — TELEPHONE ENCOUNTER
Upon review of the In Basket request we have found/obtained the documentation. After careful review of the document we are unable to complete this request for Microalbumin Creatinine Urine Ratio OR Albumin Creatinine Urine Ratio  because the documentation does not have the proper verbiage (wording) needed to close the requested care gap(s). Lab 789 was update but is no long HM. Any additional questions or concerns should be emailed to the Practice Liaisons via the appropriate education email address, please do not reply via In Basket.     Thank you  Christian Parisi

## 2023-08-10 DIAGNOSIS — F41.9 ANXIETY: ICD-10-CM

## 2023-08-10 DIAGNOSIS — Z91.89 AT INCREASED RISK FOR STRESS: ICD-10-CM

## 2023-08-10 RX ORDER — BUPROPION HYDROCHLORIDE 75 MG/1
75 TABLET ORAL DAILY
Qty: 30 TABLET | Refills: 0 | Status: SHIPPED | OUTPATIENT
Start: 2023-08-10 | End: 2023-09-09

## 2023-11-08 ENCOUNTER — LAB (OUTPATIENT)
Dept: LAB | Facility: CLINIC | Age: 68
End: 2023-11-08
Payer: MEDICARE

## 2023-11-08 ENCOUNTER — TELEPHONE (OUTPATIENT)
Dept: FAMILY MEDICINE CLINIC | Facility: CLINIC | Age: 68
End: 2023-11-08

## 2023-11-08 DIAGNOSIS — L20.84 INTRINSIC ATOPIC DERMATITIS: ICD-10-CM

## 2023-11-08 DIAGNOSIS — Z00.01 ENCOUNTER FOR GENERAL ADULT MEDICAL EXAMINATION WITH ABNORMAL FINDINGS: ICD-10-CM

## 2023-11-08 DIAGNOSIS — R53.82 CHRONIC FATIGUE: ICD-10-CM

## 2023-11-08 DIAGNOSIS — N18.2 CKD (CHRONIC KIDNEY DISEASE) STAGE 2, GFR 60-89 ML/MIN: ICD-10-CM

## 2023-11-08 DIAGNOSIS — I10 ESSENTIAL HYPERTENSION: ICD-10-CM

## 2023-11-08 DIAGNOSIS — E78.00 PURE HYPERCHOLESTEROLEMIA: ICD-10-CM

## 2023-11-08 DIAGNOSIS — E11.42 TYPE 2 DIABETES MELLITUS WITH DIABETIC POLYNEUROPATHY, WITHOUT LONG-TERM CURRENT USE OF INSULIN (HCC): ICD-10-CM

## 2023-11-08 DIAGNOSIS — E78.5 DYSLIPIDEMIA: ICD-10-CM

## 2023-11-08 LAB
ALBUMIN SERPL BCP-MCNC: 4.7 G/DL (ref 3.5–5)
ALP SERPL-CCNC: 67 U/L (ref 34–104)
ALT SERPL W P-5'-P-CCNC: 16 U/L (ref 7–52)
ANION GAP SERPL CALCULATED.3IONS-SCNC: 9 MMOL/L
AST SERPL W P-5'-P-CCNC: 19 U/L (ref 13–39)
BACTERIA UR QL AUTO: ABNORMAL /HPF
BASOPHILS # BLD AUTO: 0.06 THOUSANDS/ÂΜL (ref 0–0.1)
BASOPHILS NFR BLD AUTO: 1 % (ref 0–1)
BILIRUB SERPL-MCNC: 0.48 MG/DL (ref 0.2–1)
BILIRUB UR QL STRIP: NEGATIVE
BUN SERPL-MCNC: 9 MG/DL (ref 5–25)
CALCIUM SERPL-MCNC: 9.4 MG/DL (ref 8.4–10.2)
CHLORIDE SERPL-SCNC: 104 MMOL/L (ref 96–108)
CHOLEST SERPL-MCNC: 219 MG/DL
CLARITY UR: CLEAR
CO2 SERPL-SCNC: 28 MMOL/L (ref 21–32)
COLOR UR: ABNORMAL
CREAT SERPL-MCNC: 0.81 MG/DL (ref 0.6–1.3)
CREAT UR-MCNC: 169.8 MG/DL
EOSINOPHIL # BLD AUTO: 0.14 THOUSAND/ÂΜL (ref 0–0.61)
EOSINOPHIL NFR BLD AUTO: 3 % (ref 0–6)
ERYTHROCYTE [DISTWIDTH] IN BLOOD BY AUTOMATED COUNT: 13.9 % (ref 11.6–15.1)
GFR SERPL CREATININE-BSD FRML MDRD: 75 ML/MIN/1.73SQ M
GLUCOSE P FAST SERPL-MCNC: 94 MG/DL (ref 65–99)
GLUCOSE UR STRIP-MCNC: ABNORMAL MG/DL
HCT VFR BLD AUTO: 47.6 % (ref 34.8–46.1)
HDLC SERPL-MCNC: 61 MG/DL
HGB BLD-MCNC: 16.1 G/DL (ref 11.5–15.4)
HGB UR QL STRIP.AUTO: NEGATIVE
IMM GRANULOCYTES # BLD AUTO: 0.01 THOUSAND/UL (ref 0–0.2)
IMM GRANULOCYTES NFR BLD AUTO: 0 % (ref 0–2)
KETONES UR STRIP-MCNC: NEGATIVE MG/DL
LDLC SERPL CALC-MCNC: 117 MG/DL (ref 0–100)
LEUKOCYTE ESTERASE UR QL STRIP: NEGATIVE
LYMPHOCYTES # BLD AUTO: 2.41 THOUSANDS/ÂΜL (ref 0.6–4.47)
LYMPHOCYTES NFR BLD AUTO: 47 % (ref 14–44)
MCH RBC QN AUTO: 29.1 PG (ref 26.8–34.3)
MCHC RBC AUTO-ENTMCNC: 33.8 G/DL (ref 31.4–37.4)
MCV RBC AUTO: 86 FL (ref 82–98)
MICROALBUMIN UR-MCNC: 116.5 MG/L
MICROALBUMIN/CREAT 24H UR: 69 MG/G CREATININE (ref 0–30)
MONOCYTES # BLD AUTO: 0.44 THOUSAND/ÂΜL (ref 0.17–1.22)
MONOCYTES NFR BLD AUTO: 8 % (ref 4–12)
MUCOUS THREADS UR QL AUTO: ABNORMAL
NEUTROPHILS # BLD AUTO: 2.15 THOUSANDS/ÂΜL (ref 1.85–7.62)
NEUTS SEG NFR BLD AUTO: 41 % (ref 43–75)
NITRITE UR QL STRIP: NEGATIVE
NON-SQ EPI CELLS URNS QL MICRO: ABNORMAL /HPF
NONHDLC SERPL-MCNC: 158 MG/DL
NRBC BLD AUTO-RTO: 0 /100 WBCS
PH UR STRIP.AUTO: 5.5 [PH]
PLATELET # BLD AUTO: 295 THOUSANDS/UL (ref 149–390)
PMV BLD AUTO: 9.9 FL (ref 8.9–12.7)
POTASSIUM SERPL-SCNC: 3.8 MMOL/L (ref 3.5–5.3)
PROT SERPL-MCNC: 7.8 G/DL (ref 6.4–8.4)
PROT UR STRIP-MCNC: ABNORMAL MG/DL
RBC # BLD AUTO: 5.53 MILLION/UL (ref 3.81–5.12)
RBC #/AREA URNS AUTO: ABNORMAL /HPF
SODIUM SERPL-SCNC: 141 MMOL/L (ref 135–147)
SP GR UR STRIP.AUTO: 1.02 (ref 1–1.03)
TRIGL SERPL-MCNC: 205 MG/DL
TSH SERPL DL<=0.05 MIU/L-ACNC: 0.98 UIU/ML (ref 0.45–4.5)
UROBILINOGEN UR STRIP-ACNC: <2 MG/DL
WBC # BLD AUTO: 5.21 THOUSAND/UL (ref 4.31–10.16)
WBC #/AREA URNS AUTO: ABNORMAL /HPF

## 2023-11-08 PROCEDURE — 36415 COLL VENOUS BLD VENIPUNCTURE: CPT

## 2023-11-08 PROCEDURE — 84443 ASSAY THYROID STIM HORMONE: CPT

## 2023-11-08 PROCEDURE — 81001 URINALYSIS AUTO W/SCOPE: CPT

## 2023-11-08 PROCEDURE — 82570 ASSAY OF URINE CREATININE: CPT

## 2023-11-08 PROCEDURE — 83036 HEMOGLOBIN GLYCOSYLATED A1C: CPT

## 2023-11-08 PROCEDURE — 85025 COMPLETE CBC W/AUTO DIFF WBC: CPT

## 2023-11-08 PROCEDURE — 80053 COMPREHEN METABOLIC PANEL: CPT

## 2023-11-08 PROCEDURE — 82043 UR ALBUMIN QUANTITATIVE: CPT

## 2023-11-08 PROCEDURE — 80061 LIPID PANEL: CPT

## 2023-11-09 LAB
EST. AVERAGE GLUCOSE BLD GHB EST-MCNC: 137 MG/DL
HBA1C MFR BLD: 6.4 %

## 2023-11-29 ENCOUNTER — TELEPHONE (OUTPATIENT)
Dept: FAMILY MEDICINE CLINIC | Facility: CLINIC | Age: 68
End: 2023-11-29

## 2023-11-29 NOTE — TELEPHONE ENCOUNTER
Called Dr. Bianchi Ards office and spoke with [facility] patient has a thick buckle mucosa w/ white appearance that doesn't rub off and she has history of smoking. He is referring her back to you to be examined.

## 2023-11-29 NOTE — TELEPHONE ENCOUNTER
Patient saw Dr. Gerhardt Cockayne (dentist) and told her to follow up with her PCP because he didn't like the way the inside of the cheek looked. He told her if it was one side of her cheek he wouldn't of been concerned but since its on both cheeks he doesn't like the way it looks.

## 2024-01-26 ENCOUNTER — OFFICE VISIT (OUTPATIENT)
Dept: FAMILY MEDICINE CLINIC | Facility: CLINIC | Age: 69
End: 2024-01-26
Payer: MEDICARE

## 2024-01-26 VITALS
SYSTOLIC BLOOD PRESSURE: 110 MMHG | DIASTOLIC BLOOD PRESSURE: 68 MMHG | OXYGEN SATURATION: 95 % | WEIGHT: 135 LBS | HEIGHT: 67 IN | BODY MASS INDEX: 21.19 KG/M2 | TEMPERATURE: 99.5 F | HEART RATE: 81 BPM

## 2024-01-26 DIAGNOSIS — N18.2 CKD (CHRONIC KIDNEY DISEASE) STAGE 2, GFR 60-89 ML/MIN: ICD-10-CM

## 2024-01-26 DIAGNOSIS — I10 ESSENTIAL HYPERTENSION: ICD-10-CM

## 2024-01-26 DIAGNOSIS — E55.9 VITAMIN D DEFICIENCY: ICD-10-CM

## 2024-01-26 DIAGNOSIS — N28.1 RENAL CYST, RIGHT: ICD-10-CM

## 2024-01-26 DIAGNOSIS — E78.5 DYSLIPIDEMIA: ICD-10-CM

## 2024-01-26 DIAGNOSIS — E11.42 TYPE 2 DIABETES MELLITUS WITH DIABETIC POLYNEUROPATHY, WITHOUT LONG-TERM CURRENT USE OF INSULIN (HCC): Primary | ICD-10-CM

## 2024-01-26 PROCEDURE — 99214 OFFICE O/P EST MOD 30 MIN: CPT | Performed by: NURSE PRACTITIONER

## 2024-01-26 RX ORDER — LOSARTAN POTASSIUM 25 MG/1
25 TABLET ORAL DAILY
COMMUNITY
Start: 2024-01-15

## 2024-01-26 RX ORDER — FINERENONE 20 MG/1
20 TABLET, FILM COATED ORAL DAILY
COMMUNITY
Start: 2023-11-30

## 2024-01-26 NOTE — ASSESSMENT & PLAN NOTE
Stable   Kidney functions stable   Under care of Nephrology   The last GFR here was 81 from 01/2024.   She reviewed it with me. She had a copy of her notes, so that is much improved than before.   She will continue to monitor.   Her losartan was decreased from 50 to 25 mg because her blood pressure was soft, and her kidney function looks much better. She denies any dizziness. No orthostatic blood pressures in the office. She is also on Kerendia for kidney protection.

## 2024-01-26 NOTE — PROGRESS NOTES
Assessment/Plan:     Pt presents in office for 4 month follow up   Sees ENT   Nephrology and ENDO   Notes reviewed and appreciated    1. Type 2 diabetes mellitus with diabetic polyneuropathy, without long-term current use of insulin ([facility])  Assessment & Plan:  Last HbA1C at Endo 5.6   Doing well we will continue to monitor   Lab Results   Component Value Date    HGBA1C 6.0 (H) 11/27/2023   Her last hemoglobin A1c was 5.6%.  She is doing well. It has been stable.   Continue the medication management as is.  She is on Xeldo right now, so she will continue to do so.    Orders:  -     Comprehensive metabolic panel; Future; Expected date: 04/26/2024  -     CBC and differential; Future; Expected date: 04/26/2024  -     TSH, 3rd generation with Free T4 reflex; Future; Expected date: 04/26/2024  -     Iron Panel (Includes Ferritin, Iron Sat%, Iron, and TIBC); Future  -     UA (URINE) with reflex to Scope; Future  -     Vitamin D 25 hydroxy; Future  -     Hemoglobin A1C; Future; Expected date: 04/26/2024    2. Essential hypertension  Assessment & Plan:  Stable   Kidney functions stable   Under care of Nephrology   The last GFR here was 81 from 01/2024.   She reviewed it with me. She had a copy of her notes, so that is much improved than before.   She will continue to monitor.   Her losartan was decreased from 50 to 25 mg because her blood pressure was soft, and her kidney function looks much better. She denies any dizziness. No orthostatic blood pressures in the office. She is also on Kerendia for kidney protection.    Orders:  -     Comprehensive metabolic panel; Future; Expected date: 04/26/2024  -     CBC and differential; Future; Expected date: 04/26/2024  -     TSH, 3rd generation with Free T4 reflex; Future; Expected date: 04/26/2024  -     Iron Panel (Includes Ferritin, Iron Sat%, Iron, and TIBC); Future  -     UA (URINE) with reflex to Scope; Future  -     Vitamin D 25 hydroxy; Future  -     Hemoglobin A1C; Future;  Expected date: 04/26/2024    3. Renal cyst, right  Assessment & Plan:  I have ordered an ultrasound of the kidney and the bladder to follow up on a renal cyst that she has on the right kidney.    Orders:  -     US kidney and bladder; Future; Expected date: 01/26/2024  -     Comprehensive metabolic panel; Future; Expected date: 04/26/2024  -     CBC and differential; Future; Expected date: 04/26/2024  -     TSH, 3rd generation with Free T4 reflex; Future; Expected date: 04/26/2024  -     Iron Panel (Includes Ferritin, Iron Sat%, Iron, and TIBC); Future  -     UA (URINE) with reflex to Scope; Future  -     Vitamin D 25 hydroxy; Future  -     Hemoglobin A1C; Future; Expected date: 04/26/2024    4. CKD (chronic kidney disease) stage 2, GFR 60-89 ml/min  -     Comprehensive metabolic panel; Future; Expected date: 04/26/2024  -     CBC and differential; Future; Expected date: 04/26/2024  -     TSH, 3rd generation with Free T4 reflex; Future; Expected date: 04/26/2024  -     Iron Panel (Includes Ferritin, Iron Sat%, Iron, and TIBC); Future  -     UA (URINE) with reflex to Scope; Future  -     Vitamin D 25 hydroxy; Future  -     Hemoglobin A1C; Future; Expected date: 04/26/2024    5. Dyslipidemia  Assessment & Plan:  She is on Pravachol 40 mg, we will continue that.   Last follow-up blood work will be in 4 months.    Orders:  -     Comprehensive metabolic panel; Future; Expected date: 04/26/2024  -     CBC and differential; Future; Expected date: 04/26/2024  -     TSH, 3rd generation with Free T4 reflex; Future; Expected date: 04/26/2024  -     Iron Panel (Includes Ferritin, Iron Sat%, Iron, and TIBC); Future  -     UA (URINE) with reflex to Scope; Future  -     Vitamin D 25 hydroxy; Future  -     Hemoglobin A1C; Future; Expected date: 04/26/2024    6. Vitamin D deficiency  -     Vitamin D 25 hydroxy; Future    Follow-up.  The patient will follow up in 4 to 6 months after she sees nephrology again and  endocrinology.      Depression Screening and Follow-up Plan: Patient was screened for depression during today's encounter. They screened negative with a PHQ-2 score of 0.    Falls Plan of Care: balance, strength, and gait training instructions were provided. Recommended assistive device to help with gait and balance. Medications that increase falls were reviewed. Assessed feet and footwear. Vitamin D supplementation was recommended. Home safety education provided.     Tobacco Cessation Counseling: Tobacco cessation counseling was provided. The patient is sincerely urged to quit consumption of tobacco. She is ready to quit tobacco. Medication options and side effects of medication discussed.                Subjective:      Patient ID: Stefanie Cameron is a 68 y.o. female who presents for a 4-month follow-up for type 2 diabetes, hypertension, history of a renal cyst, CKD, dyslipidemia, and vitamin D deficiency. She does have underlying chronic fatigue. She is a smoker.    Renal cyst.  She is going to need a follow-up ultrasound at some point.   She denies any serious issues today.   She did have some marital issues at some point when I saw her last time.   She is doing much better today emotionally.     Type 2 diabetic.  Endocrine wise, it is well controlled and stable.     CKD.  Urology wise, it has been under the care of nephrologist, and she has been good.  She denies any dizziness.   No orthostatic blood pressures in the office. She is also on Kerendia for kidney protection.     Hair loss.  She did see dermatology, so she is just using more natural approaches right now.  She denies any headaches.     Anxiety and depression improved. No sleep disturbances, no suicidal ideations. She denies any eye problems or vision disturbances.    She sees a nephrologist, endocrinologist, cardiologist, and me for dyslipidemia and vitamin D deficiencies.  Myalgias, arthralgias, not unusual, intermittent issue.  She denies any  "fatigue, no fever, no weight issues.   She denies any congestion, no sinus pressure per se, but she does have on and off ear pressure changes and ringing of the ear, which has gotten much better since she has been doing seeing ENT.  She denies any cough, no shortness of breath, denies any chest pain, no palpitation, no leg swelling.   She denies any constipation or diarrhea.      The following portions of the patient's history were reviewed and updated as appropriate: allergies, current medications, past family history, past medical history, past social history, past surgical history, and problem list.    Review of Systems  Constitutional: Negative for fevers or chills, chronic fatigue, and weight gain.  HEENT: Negative congestion, postnasal dripping, or any sore issues, but she does have on and off ear pressure changes and ringing of the ear, which has gotten much better since she has been doing seeing ENT.  Respiratory: Negative for upper respiratory issues, any cough or shortness of breath.   Cardiovascular: Negative chest pain or palpitations.   Gastrointestinal: Negative for any bowel issues.  Genitourinary: Negative for urinary issues and any flank pain.  Musculoskeletal Positive for intermittent arthralgias and general myalgias.  Integumentary: Negative for rashes.   Neurological: Negative for any headaches.  Psychiatric: Negative for suicidal ideation. Positive for increased anxiety and sleep issues.      Objective:  /68 (BP Location: Left arm, Patient Position: Sitting, Cuff Size: Adult)   Pulse 81   Temp 99.5 °F (37.5 °C)   Ht 5' 7\"   Wt 61.2 kg (135 lb)   SpO2 95%   BMI 21.14 kg/m²          Physical Exam  Constitutional:       Appearance: She is oriented x3 with normal mood and behavior.  HENT:      Head: Atraumatic. No congestion. No rhinorrhea.     Right Ear: Tympanic membrane normal.      Left Ear: Tympanic membrane normal.      Mouth/Throat:      Pharynx: Uvula midline.   Eyes:      " Pupils: Pupils are equal, round, and reactive to light.   Cardiovascular:      Rate and Rhythm: Heart rate in the 80s. Normal rate, regular rhythm. No murmurs.  Pulmonary:      Effort: Clear with normal effort and breath sounds. No wheezing or rhonchi.  Abdominal:      Palpations: Abdomen is soft, nondistended.  Musculoskeletal:      Cervical back: Normal range of motion.   Skin:     General: Skin is warm. No edema bilaterally.  Behavioral: She is oriented x3 with normal mood and behavior.    I have personally reviewed results with the patient.    She had recent blood work done in the beginning of January CBC was stable.     White blood cells 4.0, H&H 15.1 g/dL and 45.7%.  Platelets at 267 thousand/uL.  Magnesium stable at 2.3, she did have some +3 glucose but that says secondary to the medications that she is on.  Her latest hemoglobin A1c was 6.0%.  Thyroid functions were okay, 1.40.         Transcribed for MARSHALL Patricia, by Zhane Royal on 01/30/24 at 6:28 PM. Powered by Dragon [employer].

## 2024-01-26 NOTE — ASSESSMENT & PLAN NOTE
Last HbA1C at Endo 5.6   Doing well we will continue to monitor   Lab Results   Component Value Date    HGBA1C 6.0 (H) 11/27/2023   Her last hemoglobin A1c was 5.6%.  She is doing well. It has been stable.   Continue the medication management as is.  She is on Xeldo right now, so she will continue to do so.

## 2024-01-30 NOTE — ASSESSMENT & PLAN NOTE
She is on Pravachol 40 mg, we will continue that.   Last follow-up blood work will be in 4 months.

## 2024-01-30 NOTE — ASSESSMENT & PLAN NOTE
I have ordered an ultrasound of the kidney and the bladder to follow up on a renal cyst that she has on the right kidney.

## 2024-01-31 LAB
LEFT EYE DIABETIC RETINOPATHY: NORMAL
RIGHT EYE DIABETIC RETINOPATHY: NORMAL

## 2024-02-15 ENCOUNTER — HOSPITAL ENCOUNTER (OUTPATIENT)
Dept: ULTRASOUND IMAGING | Facility: HOSPITAL | Age: 69
End: 2024-02-15
Payer: MEDICARE

## 2024-02-15 DIAGNOSIS — N28.1 RENAL CYST, RIGHT: ICD-10-CM

## 2024-02-15 PROCEDURE — 76775 US EXAM ABDO BACK WALL LIM: CPT

## 2024-02-21 PROBLEM — Z13.89 SCREENING FOR SKIN CONDITION: Status: RESOLVED | Noted: 2018-06-18 | Resolved: 2024-02-21

## 2024-02-23 ENCOUNTER — TELEPHONE (OUTPATIENT)
Dept: FAMILY MEDICINE CLINIC | Facility: CLINIC | Age: 69
End: 2024-02-23

## 2024-02-23 NOTE — RESULT ENCOUNTER NOTE
Benign right renal simple cyst which requires no further follow-up imaging but she can discuss with her kidney doctor     No hydronephrosis or shadowing renal calculi

## 2024-03-12 ENCOUNTER — OFFICE VISIT (OUTPATIENT)
Dept: FAMILY MEDICINE CLINIC | Facility: CLINIC | Age: 69
End: 2024-03-12
Payer: MEDICARE

## 2024-03-12 VITALS
HEIGHT: 67 IN | HEART RATE: 68 BPM | SYSTOLIC BLOOD PRESSURE: 104 MMHG | TEMPERATURE: 98.2 F | BODY MASS INDEX: 20.25 KG/M2 | WEIGHT: 129 LBS | DIASTOLIC BLOOD PRESSURE: 68 MMHG | OXYGEN SATURATION: 96 %

## 2024-03-12 DIAGNOSIS — R53.82 CHRONIC FATIGUE: ICD-10-CM

## 2024-03-12 DIAGNOSIS — F41.9 ANXIETY: ICD-10-CM

## 2024-03-12 DIAGNOSIS — E11.42 TYPE 2 DIABETES MELLITUS WITH DIABETIC POLYNEUROPATHY, WITHOUT LONG-TERM CURRENT USE OF INSULIN (HCC): ICD-10-CM

## 2024-03-12 DIAGNOSIS — R19.4 RECENT CHANGE IN FREQUENCY OF BOWEL MOVEMENTS: ICD-10-CM

## 2024-03-12 DIAGNOSIS — R63.4 WEIGHT LOSS: Primary | ICD-10-CM

## 2024-03-12 LAB — SL AMB POCT HEMOGLOBIN AIC: 6 (ref ?–6.5)

## 2024-03-12 PROCEDURE — 83036 HEMOGLOBIN GLYCOSYLATED A1C: CPT | Performed by: NURSE PRACTITIONER

## 2024-03-12 PROCEDURE — 99214 OFFICE O/P EST MOD 30 MIN: CPT | Performed by: NURSE PRACTITIONER

## 2024-03-12 NOTE — PROGRESS NOTES
Assessment/Plan:    Pt is a 68 yr old female   Presents in office with excessive weight loss in the last year   States has not been feeling hungry   Has had some bowel issues and changes   And worried about over 20 lbs weight loss   Denies any abdominal pain   Denies any nausea or vomiting but has had some GERD   And Diabetes has been stable   She is under care of ENDO, nephrology and Cardiology     Plan CT scan and if normal follow up with GI         Problem List Items Addressed This Visit       Type 2 diabetes mellitus with diabetic polyneuropathy, without long-term current use of insulin (HCC)    Relevant Orders    POCT hemoglobin A1c (Completed)    Chronic fatigue     Other Visit Diagnoses       Weight loss    -  Primary    Relevant Orders    CT abdomen pelvis wo contrast    POCT hemoglobin A1c (Completed)    Recent change in frequency of bowel movements        Anxiety                  Subjective:      Patient ID: Stefanie Cameron is a 68 y.o. female.    Pt is a 68 yr old female   Presents in office with excessive weight loss in the last year   States has not been feeling hungry   Has had some bowel issues and changes   And worried about over 20 lbs weight loss   Denies any abdominal pain   Denies any nausea or vomiting but has had some GERD   And Diabetes has been stable         The following portions of the patient's history were reviewed and updated as appropriate:   Past Medical History:  She has a past medical history of Diabetes mellitus (HCC), Ear problems, Tinnitus, and [place] teeth extracted (08/14/2020).,  _______________________________________________________________________  Medical Problems:  does not have any pertinent problems on file.,  _______________________________________________________________________  Past Surgical History:   has no past surgical history on file.,  _______________________________________________________________________  Family History:  family history is not on  file.,  _______________________________________________________________________  Social History:   reports that she has been smoking cigarettes. She has never used smokeless tobacco. She reports that she does not drink alcohol and does not use drugs.,  _______________________________________________________________________  Allergies:  has No Known Allergies..  _______________________________________________________________________  Current Outpatient Medications   Medication Sig Dispense Refill    Finerenone (Kerendia) 20 MG TABS Take 20 mg by mouth in the morning      fluticasone (FLONASE) 50 mcg/act nasal spray SPRAY 2 SPRAYS INTO EACH NOSTRIL EVERY DAY 48 mL 2    Glucose Blood (BLOOD GLUCOSE TEST STRIPS) STRP Check BG BID      hydroquinone 4 % cream APPLY TO BOTH LEGS EVERY DAY      ketoconazole (NIZORAL) 2 % cream APPLY AS DIRECTED DAILY TO RASH      Lancets (OneTouch Delica Plus Dymmqt34B) MISC Use 2 (two) times a day      losartan (COZAAR) 25 mg tablet Take 25 mg by mouth daily      pyridoxine (VITAMIN B6) 50 mg tablet Take 50 mg by mouth daily      triamcinolone (KENALOG) 0.1 % cream APPLY TO AFFECTED AREA EVERY DAY      pravastatin (PRAVACHOL) 40 mg tablet Take 1 tablet (40 mg total) by mouth daily 90 tablet 1    Xigduo XR  MG TB24 Take 1 tablet by mouth in the morning 90 tablet 1     No current facility-administered medications for this visit.     _______________________________________________________________________  Review of Systems   Constitutional:  Positive for fatigue and unexpected weight change (weight loss over 20 lbs).   HENT:  Negative for congestion, postnasal drip and sore throat.    Respiratory:  Negative for cough and shortness of breath.    Cardiovascular:  Negative for chest pain and palpitations.   Gastrointestinal:  Positive for diarrhea. Negative for abdominal distention, abdominal pain, nausea and vomiting.        GERD   Excessive weight loss    Endocrine:        Stable Type 2  "diabetes    Genitourinary:  Negative for difficulty urinating and flank pain.        CKD under care of Nephrology    Musculoskeletal:  Negative for arthralgias and myalgias.   Skin:  Negative for rash.   Neurological:  Negative for headaches.   Hematological:  Negative for adenopathy.   Psychiatric/Behavioral:  Negative for sleep disturbance and suicidal ideas. The patient is not nervous/anxious.          Objective:  Vitals:    03/12/24 1320   BP: 104/68   BP Location: Left arm   Patient Position: Sitting   Cuff Size: Adult   Pulse: 68   Temp: 98.2 °F (36.8 °C)   SpO2: 96%   Weight: 58.5 kg (129 lb)   Height: 5' 7\" (1.702 m)     Body mass index is 20.2 kg/m².     Physical Exam  Vitals and nursing note reviewed.   Constitutional:       Appearance: Normal appearance.      Comments: With weight loss    HENT:      Head: Atraumatic.      Nose: No congestion or rhinorrhea.   Eyes:      Extraocular Movements: Extraocular movements intact.   Cardiovascular:      Rate and Rhythm: Normal rate and regular rhythm.      Heart sounds: Normal heart sounds.   Pulmonary:      Breath sounds: Normal breath sounds.   Abdominal:      Palpations: Abdomen is soft.   Musculoskeletal:      Right lower leg: No edema.      Left lower leg: No edema.   Skin:     General: Skin is warm.      Capillary Refill: Capillary refill takes less than 2 seconds.   Neurological:      Mental Status: She is alert and oriented to person, place, and time.   Psychiatric:         Mood and Affect: Mood normal.         Behavior: Behavior normal.         "

## 2024-03-19 ENCOUNTER — HOSPITAL ENCOUNTER (OUTPATIENT)
Dept: CT IMAGING | Facility: CLINIC | Age: 69
Discharge: HOME | End: 2024-03-19
Payer: MEDICARE

## 2024-03-19 DIAGNOSIS — R63.4 WEIGHT LOSS: ICD-10-CM

## 2024-03-19 PROCEDURE — 74176 CT ABD & PELVIS W/O CONTRAST: CPT

## 2024-03-20 ENCOUNTER — TELEPHONE (OUTPATIENT)
Dept: FAMILY MEDICINE CLINIC | Facility: CLINIC | Age: 69
End: 2024-03-20

## 2024-03-20 DIAGNOSIS — R63.4 WEIGHT LOSS: Primary | ICD-10-CM

## 2024-03-20 NOTE — RESULT ENCOUNTER NOTE
CT SCAN ALL LOOKS GOOD   NOTHING MAJOR I SEE   BUT I WOULD REFER TO GI IF SHE CONTINUES TO LOSE WEIGHT THEY MAY HAVE TO REPEAT THE COLONOSCOPY

## 2024-04-04 ENCOUNTER — TELEPHONE (OUTPATIENT)
Age: 69
End: 2024-04-04

## 2024-04-04 NOTE — TELEPHONE ENCOUNTER
Patient returning a call from referral. Patient stated she is waiting to see how she does this week but she will call to schedule the appt

## 2024-04-09 ENCOUNTER — TELEPHONE (OUTPATIENT)
Dept: ADMINISTRATIVE | Facility: OTHER | Age: 69
End: 2024-04-09

## 2024-04-09 NOTE — TELEPHONE ENCOUNTER
----- Message from Caitlin Sturges-Prichard sent at 4/9/2024  9:33 AM EDT -----  Regarding: Mammo  04/09/24 9:33 AM    Hello, our patient Stefanie Cameron has had Mammogram completed/performed. Please assist in updating the patient chart by pulling the Care Everywhere (CE) document. The date of service is 4/8/24. It was done at [facility]     Thank you,  Caitlin Sturges-Prichard  [facility] [place]

## 2024-04-09 NOTE — TELEPHONE ENCOUNTER
Upon review of the In Basket request we were able to locate, review, and update the patient chart as requested for Mammogram.    Any additional questions or concerns should be emailed to the Practice Liaisons via the appropriate education email address, please do not reply via In Basket.    Thank you  Juani Philippe MA

## 2024-06-11 ENCOUNTER — TELEPHONE (OUTPATIENT)
Dept: FAMILY MEDICINE CLINIC | Facility: CLINIC | Age: 69
End: 2024-06-11

## 2024-06-11 NOTE — TELEPHONE ENCOUNTER
Called patient and LMOM for a call back if she has had a recent Diabetic eye exam. If not, she is due and needs to call her eye dr to make an appt.

## 2024-07-19 ENCOUNTER — RA CDI HCC (OUTPATIENT)
Dept: OTHER | Facility: HOSPITAL | Age: 69
End: 2024-07-19

## 2024-07-26 ENCOUNTER — OFFICE VISIT (OUTPATIENT)
Dept: FAMILY MEDICINE CLINIC | Facility: CLINIC | Age: 69
End: 2024-07-26
Payer: MEDICARE

## 2024-07-26 ENCOUNTER — TELEPHONE (OUTPATIENT)
Dept: FAMILY MEDICINE CLINIC | Facility: CLINIC | Age: 69
End: 2024-07-26

## 2024-07-26 VITALS
TEMPERATURE: 98.2 F | SYSTOLIC BLOOD PRESSURE: 122 MMHG | HEIGHT: 67 IN | HEART RATE: 61 BPM | BODY MASS INDEX: 20.4 KG/M2 | WEIGHT: 130 LBS | OXYGEN SATURATION: 98 % | DIASTOLIC BLOOD PRESSURE: 60 MMHG

## 2024-07-26 DIAGNOSIS — E78.5 DYSLIPIDEMIA: ICD-10-CM

## 2024-07-26 DIAGNOSIS — R71.8 ELEVATED HEMATOCRIT: ICD-10-CM

## 2024-07-26 DIAGNOSIS — Z00.00 MEDICARE ANNUAL WELLNESS VISIT, SUBSEQUENT: ICD-10-CM

## 2024-07-26 DIAGNOSIS — N18.2 CKD (CHRONIC KIDNEY DISEASE) STAGE 2, GFR 60-89 ML/MIN: ICD-10-CM

## 2024-07-26 DIAGNOSIS — I10 ESSENTIAL HYPERTENSION: Primary | ICD-10-CM

## 2024-07-26 DIAGNOSIS — E55.9 VITAMIN D DEFICIENCY: ICD-10-CM

## 2024-07-26 DIAGNOSIS — E78.00 PURE HYPERCHOLESTEROLEMIA: ICD-10-CM

## 2024-07-26 DIAGNOSIS — E11.42 TYPE 2 DIABETES MELLITUS WITH DIABETIC POLYNEUROPATHY, WITHOUT LONG-TERM CURRENT USE OF INSULIN (HCC): ICD-10-CM

## 2024-07-26 PROBLEM — M54.30 SCIATICA: Status: ACTIVE | Noted: 2024-07-01

## 2024-07-26 PROCEDURE — 99214 OFFICE O/P EST MOD 30 MIN: CPT | Performed by: NURSE PRACTITIONER

## 2024-07-26 PROCEDURE — G0438 PPPS, INITIAL VISIT: HCPCS | Performed by: NURSE PRACTITIONER

## 2024-07-26 RX ORDER — LANOLIN ALCOHOL/MO/W.PET/CERES
1000 CREAM (GRAM) TOPICAL DAILY
COMMUNITY
Start: 2024-07-10

## 2024-07-26 RX ORDER — ERGOCALCIFEROL 1.25 MG/1
50000 CAPSULE ORAL
COMMUNITY
Start: 2024-06-05

## 2024-07-26 NOTE — TELEPHONE ENCOUNTER
Patient had DM eye exam done with Dr Gann at [facility]   Called them at [phone] and LMOM requesting report to be faxed over . They are closed today - but reopen Monday 7/29 at 9am

## 2024-07-26 NOTE — PROGRESS NOTES
Ambulatory Visit  Name: Stefanie Cameron      : 1955      MRN: [id number]  Encounter Provider: MARSHALL Patricia  Encounter Date: 2024   Encounter department: [facility]    Assessment & Plan   1. Essential hypertension  Comments:  stable  Assessment & Plan:  HTN assessed for stability and discussed plan of care   CrCl cannot be calculated (Patient's most recent lab result is older than the maximum 7 days allowed.).   Reviewed labs   .  No changes   We will continue to monitor    Orders:  -     Testosterone; Future; Expected date: 10/26/2024  -     Comprehensive metabolic panel; Future; Expected date: 10/26/2024  -     TSH, 3rd generation with Free T4 reflex; Future; Expected date: 10/26/2024  -     CBC and differential; Future; Expected date: 10/26/2024  -     Iron Panel (Includes Ferritin, Iron Sat%, Iron, and TIBC); Future; Expected date: 10/26/2024  -     Vitamin D 25 hydroxy; Future; Expected date: 10/26/2024  -     UA/M w/rflx Culture, Routine; Future; Expected date: 10/26/2024  -     Lipid panel; Future; Expected date: 10/26/2024  -     Hemoglobin A1C; Future; Expected date: 10/26/2024  2. Type 2 diabetes mellitus with diabetic polyneuropathy, without long-term current use of insulin (HCC)  Assessment & Plan:  Diabetes assessed for stability and discussed plan of care   Reviewed medications and changes as needed   Reviewed labs and medications   Diet and adequate hydration reviewed   We will continue 4 month follow up surveillance  Reviewed Podiatry follow up   Reviewed  DM eye exam    Lab Results   Component Value Date    HGBA1C 6.2 (H) 2024     Orders:  -     Testosterone; Future; Expected date: 10/26/2024  -     Comprehensive metabolic panel; Future; Expected date: 10/26/2024  -     TSH, 3rd generation with Free T4 reflex; Future; Expected date: 10/26/2024  -     CBC and differential; Future; Expected date: 10/26/2024  -     Iron Panel (Includes Ferritin,  Iron Sat%, Iron, and TIBC); Future; Expected date: 10/26/2024  -     Vitamin D 25 hydroxy; Future; Expected date: 10/26/2024  -     UA/M w/rflx Culture, Routine; Future; Expected date: 10/26/2024  -     Lipid panel; Future; Expected date: 10/26/2024  -     Hemoglobin A1C; Future; Expected date: 10/26/2024  3. CKD (chronic kidney disease) stage 2, GFR 60-89 ml/min  Assessment & Plan:  Stable sees Nephrology - continues as per POC   Lab Results   Component Value Date    EGFR 81 05/31/2024    EGFR 81 11/27/2023    EGFR 75 11/08/2023    CREATININE 0.80 05/31/2024    CREATININE 0.80 11/27/2023    CREATININE 0.81 11/08/2023     Orders:  -     Testosterone; Future; Expected date: 10/26/2024  -     Comprehensive metabolic panel; Future; Expected date: 10/26/2024  -     TSH, 3rd generation with Free T4 reflex; Future; Expected date: 10/26/2024  -     CBC and differential; Future; Expected date: 10/26/2024  -     Iron Panel (Includes Ferritin, Iron Sat%, Iron, and TIBC); Future; Expected date: 10/26/2024  -     Vitamin D 25 hydroxy; Future; Expected date: 10/26/2024  -     UA/M w/rflx Culture, Routine; Future; Expected date: 10/26/2024  -     Lipid panel; Future; Expected date: 10/26/2024  -     Hemoglobin A1C; Future; Expected date: 10/26/2024  4. Elevated hematocrit  -     Testosterone; Future; Expected date: 10/26/2024  -     Comprehensive metabolic panel; Future; Expected date: 10/26/2024  -     TSH, 3rd generation with Free T4 reflex; Future; Expected date: 10/26/2024  -     CBC and differential; Future; Expected date: 10/26/2024  -     Iron Panel (Includes Ferritin, Iron Sat%, Iron, and TIBC); Future; Expected date: 10/26/2024  -     Vitamin D 25 hydroxy; Future; Expected date: 10/26/2024  -     UA/M w/rflx Culture, Routine; Future; Expected date: 10/26/2024  -     Lipid panel; Future; Expected date: 10/26/2024  -     Hemoglobin A1C; Future; Expected date: 10/26/2024  5. Pure hypercholesterolemia  -     Testosterone;  Future; Expected date: 10/26/2024  -     Comprehensive metabolic panel; Future; Expected date: 10/26/2024  -     TSH, 3rd generation with Free T4 reflex; Future; Expected date: 10/26/2024  -     CBC and differential; Future; Expected date: 10/26/2024  -     Iron Panel (Includes Ferritin, Iron Sat%, Iron, and TIBC); Future; Expected date: 10/26/2024  -     Vitamin D 25 hydroxy; Future; Expected date: 10/26/2024  -     UA/M w/rflx Culture, Routine; Future; Expected date: 10/26/2024  -     Lipid panel; Future; Expected date: 10/26/2024  -     Hemoglobin A1C; Future; Expected date: 10/26/2024  6. Medicare annual wellness visit, subsequent  -     Testosterone; Future; Expected date: 10/26/2024  -     Comprehensive metabolic panel; Future; Expected date: 10/26/2024  -     TSH, 3rd generation with Free T4 reflex; Future; Expected date: 10/26/2024  -     CBC and differential; Future; Expected date: 10/26/2024  -     Iron Panel (Includes Ferritin, Iron Sat%, Iron, and TIBC); Future; Expected date: 10/26/2024  -     Vitamin D 25 hydroxy; Future; Expected date: 10/26/2024  -     UA/M w/rflx Culture, Routine; Future; Expected date: 10/26/2024  -     Lipid panel; Future; Expected date: 10/26/2024  -     Hemoglobin A1C; Future; Expected date: 10/26/2024  7. Vitamin D deficiency  -     Vitamin D 25 hydroxy; Future; Expected date: 10/26/2024  8. Dyslipidemia  Assessment & Plan:  Hyperlipidemia diagnoses assessed and discussed   Reviewed medications and plan of care   Labs reviewed   05/31/2024   Discussed low fat low cholesterol diet   Discussed exercise and adequate hydration   Will continue to monitor every 4 months         Depression Screening and Follow-up Plan: Patient was screened for depression during today's encounter. They screened negative with a PHQ-2 score of 0.    Falls Plan of Care: balance, strength, and gait training instructions were provided. Recommended assistive device to help with gait and balance. Medications  that increase falls were reviewed. Assessed feet and footwear. Vitamin D supplementation was recommended. Home safety education provided.       Preventive health issues were discussed with patient, and age appropriate screening tests were ordered as noted in patient's After Visit Summary. Personalized health advice and appropriate referrals for health education or preventive services given if needed, as noted in patient's After Visit Summary.    History of Present Illness     Page patient is a 68-year-old female presents in office for four-month follow-up and medicare wellness   Underlying medical history of type 2 diabetes she has been stable- sees ENDO and Nephrology   Underlying CKD stage 2 she is under care of Nephrology stable improved from last visit   Hypertension stable does see a cardiologist   Cholesterol manage by Pravachol 40 mg once a day  Current complaints of multiple intermittent sweats not only night sweats different times of the day postmenopausal.  Emotional.  And has had intermittent palpitations.  Excessively worried about her health an extensive family history of kidney issues so she is over is concerned about her kidneys but reinforce that she is in a great care of her nephrologist.   Her diabetes is well managed and she is hydrating well she is active she is doing the best she can to maintain her health. - discussed anxiety management   Stopped Wellbutrin did not work for her   She is doing emotional supportive measures to deal with stress doing well          Patient Care Team:  MARSHALL Patricia as PCP - General (Nurse Practitioner)  Bret Pelaez MD    Review of Systems   Constitutional:  Positive for unexpected weight change (weight loss over 20 lbs however has been stable at 130 lbs). Negative for fatigue.   HENT:  Negative for congestion, postnasal drip and sore throat.    Respiratory:  Negative for cough and shortness of breath.    Cardiovascular:  Negative for chest pain and  palpitations.   Gastrointestinal:  Negative for abdominal distention, abdominal pain, diarrhea, nausea and vomiting.        GERD   Weight loss has stabilized we had discussed follow up with GI    Endocrine:        Stable Type 2 diabetes - stable    Genitourinary:  Negative for difficulty urinating and flank pain.        CKD under care of Nephrology    Musculoskeletal:  Negative for arthralgias and myalgias.   Skin:  Negative for rash.   Neurological:  Negative for headaches.   Hematological:  Negative for adenopathy.   Psychiatric/Behavioral:  Negative for sleep disturbance and suicidal ideas. The patient is not nervous/anxious.      Medical History Reviewed by provider this encounter:  Tobacco  Allergies  Meds  Problems  Med Hx  Surg Hx  Fam Hx       Annual Wellness Visit Questionnaire   Stefanie is here for her Subsequent Wellness visit.     Health Risk Assessment:   Patient rates overall health as excellent. Patient feels that their physical health rating is same. Patient is satisfied with their life. Eyesight was rated as slightly worse. Hearing was rated as same. Patient feels that their emotional and mental health rating is much better. Patients states they are sometimes angry. Patient states they are sometimes unusually tired/fatigued. Pain experienced in the last 7 days has been none. Patient states that she has experienced no weight loss or gain in last 6 months.     Depression Screening:   PHQ-2 Score: 0      Fall Risk Screening:   In the past year, patient has experienced: no history of falling in past year      Urinary Incontinence Screening:   Patient has leaked urine accidently in the last six months.     Home Safety:  Patient does not have trouble with stairs inside or outside of their home. Patient has working smoke alarms and has working carbon monoxide detector. Home safety hazards include: none.     Nutrition:   Current diet is Regular.     Medications:   Patient is currently taking  over-the-counter supplements. OTC medications include: see medication list. Patient is able to manage medications.     Activities of Daily Living (ADLs)/Instrumental Activities of Daily Living (IADLs):   Walk and transfer into and out of bed and chair?: Yes  Dress and groom yourself?: Yes    Bathe or shower yourself?: Yes    Feed yourself? Yes  Do your laundry/housekeeping?: Yes  Manage your money, pay your bills and track your expenses?: Yes  Make your own meals?: Yes    Do your own shopping?: Yes    Previous Hospitalizations:   Any hospitalizations or ED visits within the last 12 months?: No      Advance Care Planning:   Living will: No    Advanced directive: No    Advanced directive counseling given: Yes    ACP document given: Yes      Cognitive Screening:   Provider or family/friend/caregiver concerned regarding cognition?: No    PREVENTIVE SCREENINGS      Cardiovascular Screening:    General: Screening Not Indicated and History Lipid Disorder      Diabetes Screening:     General: Screening Not Indicated and History Diabetes      Colorectal Cancer Screening:     General: Screening Current      Breast Cancer Screening:     General: Screening Current      Cervical Cancer Screening:    General: Screening Not Indicated      Osteoporosis Screening:    General: Screening Current      Lung Cancer Screening:     General: Screening Not Indicated      Hepatitis C Screening:    General: Screening Current    Screening, Brief Intervention, and Referral to Treatment (SBIRT)    Screening  Typical number of drinks in a day: 0  Typical number of drinks in a week: 0  Interpretation: Low risk drinking behavior.    Single Item Drug Screening:  How often have you used an illegal drug (including marijuana) or a prescription medication for non-medical reasons in the past year? never    Single Item Drug Screen Score: 0  Interpretation: Negative screen for possible drug use disorder    Time Spent  Time spent screening/evaluating the  "patient for alcohol misuse: 0 minutes. Time spent providing alcohol/substance abuse assessment and intervention services: 0 minutes.    Annual Depression Screening  Time spent screening and evaluating the patient for depression during today's encounter was 10 minutes.    Other Counseling Topics:   Car/seat belt/driving safety, skin self-exam, sunscreen and calcium and vitamin D intake and regular weightbearing exercise.     Social Determinants of Health     Food Insecurity: Patient Declined (7/26/2024)    Hunger Vital Sign     Worried About Running Out of Food in the Last Year: Patient declined     Ran Out of Food in the Last Year: Patient declined   Transportation Needs: Patient Declined (7/26/2024)    PRAPARE - Transportation     Lack of Transportation (Medical): Patient declined     Lack of Transportation (Non-Medical): Patient declined   Housing Stability: Patient Declined (7/26/2024)    Housing Stability Vital Sign     Unable to Pay for Housing in the Last Year: Patient declined     Number of Times Moved in the Last Year: 1     Homeless in the Last Year: Patient declined   Utilities: Patient Declined (7/26/2024)    [facility] Utilities     Threatened with loss of utilities: Patient declined     No results found.    Objective     /60 (BP Location: Left arm, Patient Position: Sitting, Cuff Size: Adult)   Pulse 61   Temp 98.2 °F (36.8 °C)   Ht 5' 7\" (1.702 m)   Wt 59 kg (130 lb)   SpO2 98%   BMI 20.36 kg/m²     Physical Exam  Vitals and nursing note reviewed.   Constitutional:       Appearance: Normal appearance.   HENT:      Head: Normocephalic and atraumatic.   Eyes:      Extraocular Movements: Extraocular movements intact.   Cardiovascular:      Rate and Rhythm: Normal rate and regular rhythm.      Pulses: Normal pulses.      Heart sounds: Normal heart sounds.   Pulmonary:      Effort: Pulmonary effort is normal.      Breath sounds: Normal breath sounds.   Abdominal:      Palpations: Abdomen is soft. "   Musculoskeletal:      Cervical back: Normal range of motion.      Right lower leg: No edema.      Left lower leg: No edema.   Skin:     General: Skin is warm.      Capillary Refill: Capillary refill takes less than 2 seconds.   Neurological:      Mental Status: She is alert and oriented to person, place, and time.   Psychiatric:         Mood and Affect: Mood normal.         Behavior: Behavior normal.     LABS REVIEWED AND DISCUSSED   Administrative Statements   I have spent a total time of 45  minutes in caring for this patient on the day of the visit/encounter including Diagnostic results, Prognosis, Risks and benefits of tx options, Instructions for management, Patient and family education, Importance of tx compliance, Risk factor reductions, Impressions, Counseling / Coordination of care, Documenting in the medical record, Reviewing / ordering tests, medicine, procedures  , Obtaining or reviewing history  , and Communicating with other healthcare professionals .

## 2024-07-29 ENCOUNTER — TELEPHONE (OUTPATIENT)
Age: 69
End: 2024-07-29

## 2024-07-29 NOTE — TELEPHONE ENCOUNTER
PT called in requesting if we could please print out her upcoming Labs Orders she has listed in her chart?     Pt is requesting to please print lab orders & mail it to her home address, as well as with an appt reminder card, of her future appt: 02/19/25.    PT also wanted to report to Becca, that she has been freezing cold and would like for Jacquelinzuleyka to please check her recent Iron levels.     Please advise & call pt back with update on her iron levels. Thank you!    Stefanie Cameron   [phone]

## 2024-07-30 ENCOUNTER — TELEPHONE (OUTPATIENT)
Dept: ADMINISTRATIVE | Facility: OTHER | Age: 69
End: 2024-07-30

## 2024-07-30 NOTE — TELEPHONE ENCOUNTER
----- Message from Agnes LANGE sent at 7/29/2024  1:42 PM EDT -----  Regarding: DM EYE  07/29/24 1:42 PM    Hello, our patient Stefanie Cameron has had Diabetic Eye Exam completed/performed. Please assist in updating the patient chart by pulling the document from the Media Tab. The date of service is 1/31/24. It was scanned in at her appt 7/26/24.     Thank you,  Caitlin Sturges-Prichard PG [facility] JOE

## 2024-07-30 NOTE — ASSESSMENT & PLAN NOTE
Stable sees Nephrology - continues as per POC   Lab Results   Component Value Date    EGFR 81 05/31/2024    EGFR 81 11/27/2023    EGFR 75 11/08/2023    CREATININE 0.80 05/31/2024    CREATININE 0.80 11/27/2023    CREATININE 0.81 11/08/2023

## 2024-07-30 NOTE — ASSESSMENT & PLAN NOTE
Hyperlipidemia diagnoses assessed and discussed   Reviewed medications and plan of care   Labs reviewed   05/31/2024   Discussed low fat low cholesterol diet   Discussed exercise and adequate hydration   Will continue to monitor every 4 months

## 2024-07-30 NOTE — TELEPHONE ENCOUNTER
Upon review of the In Basket request we were able to locate, review, and update the patient chart as requested for Diabetic Eye Exam.    Any additional questions or concerns should be emailed to the Practice Liaisons via the appropriate education email address, please do not reply via In Basket.    Thank you  Ramiro Dominguez   [facility]

## 2024-07-30 NOTE — ASSESSMENT & PLAN NOTE
HTN assessed for stability and discussed plan of care   CrCl cannot be calculated (Patient's most recent lab result is older than the maximum 7 days allowed.).   Reviewed labs   .  No changes   We will continue to monitor

## 2024-07-30 NOTE — ASSESSMENT & PLAN NOTE
Diabetes assessed for stability and discussed plan of care   Reviewed medications and changes as needed   Reviewed labs and medications   Diet and adequate hydration reviewed   We will continue 4 month follow up surveillance  Reviewed Podiatry follow up   Reviewed  DM eye exam    Lab Results   Component Value Date    HGBA1C 6.2 (H) 05/31/2024

## 2024-08-02 ENCOUNTER — TELEPHONE (OUTPATIENT)
Age: 69
End: 2024-08-02

## 2024-08-02 NOTE — TELEPHONE ENCOUNTER
Pt called back and states her body is always so cold. Asking if her labs from June can be reviewed again and what she can do about feeling cold all the time.     Please advise

## 2024-11-03 ENCOUNTER — APPOINTMENT (EMERGENCY)
Dept: CT IMAGING | Facility: HOSPITAL | Age: 69
End: 2024-11-03
Payer: MEDICARE

## 2024-11-03 ENCOUNTER — HOSPITAL ENCOUNTER (EMERGENCY)
Facility: HOSPITAL | Age: 69
Discharge: HOME | End: 2024-11-03
Attending: EMERGENCY MEDICINE
Payer: MEDICARE

## 2024-11-03 VITALS
OXYGEN SATURATION: 97 % | RESPIRATION RATE: 18 BRPM | TEMPERATURE: 97.6 F | WEIGHT: 133.16 LBS | SYSTOLIC BLOOD PRESSURE: 128 MMHG | HEART RATE: 62 BPM | DIASTOLIC BLOOD PRESSURE: 75 MMHG | BODY MASS INDEX: 20.86 KG/M2

## 2024-11-03 DIAGNOSIS — M54.50 ACUTE LOW BACK PAIN WITHOUT SCIATICA, UNSPECIFIED BACK PAIN LATERALITY: Primary | ICD-10-CM

## 2024-11-03 LAB
ANION GAP SERPL CALCULATED.3IONS-SCNC: 10 MMOL/L (ref 4–13)
BACTERIA UR QL AUTO: NORMAL /HPF
BASOPHILS # BLD AUTO: 0.04 THOUSANDS/ΜL (ref 0–0.1)
BASOPHILS NFR BLD AUTO: 1 % (ref 0–1)
BILIRUB UR QL STRIP: NEGATIVE
BUN SERPL-MCNC: 9 MG/DL (ref 5–25)
CALCIUM SERPL-MCNC: 9.5 MG/DL (ref 8.4–10.2)
CHLORIDE SERPL-SCNC: 102 MMOL/L (ref 96–108)
CLARITY UR: CLEAR
CO2 SERPL-SCNC: 25 MMOL/L (ref 21–32)
COLOR UR: COLORLESS
CREAT SERPL-MCNC: 0.8 MG/DL (ref 0.6–1.3)
EOSINOPHIL # BLD AUTO: 0.04 THOUSAND/ΜL (ref 0–0.61)
EOSINOPHIL NFR BLD AUTO: 1 % (ref 0–6)
ERYTHROCYTE [DISTWIDTH] IN BLOOD BY AUTOMATED COUNT: 13.3 % (ref 11.6–15.1)
GFR SERPL CREATININE-BSD FRML MDRD: 76 ML/MIN/1.73SQ M
GLUCOSE SERPL-MCNC: 87 MG/DL (ref 65–140)
GLUCOSE UR STRIP-MCNC: ABNORMAL MG/DL
HCT VFR BLD AUTO: 42.7 % (ref 34.8–46.1)
HGB BLD-MCNC: 14.4 G/DL (ref 11.5–15.4)
HGB UR QL STRIP.AUTO: NEGATIVE
IMM GRANULOCYTES # BLD AUTO: 0.01 THOUSAND/UL (ref 0–0.2)
IMM GRANULOCYTES NFR BLD AUTO: 0 % (ref 0–2)
KETONES UR STRIP-MCNC: NEGATIVE MG/DL
LEUKOCYTE ESTERASE UR QL STRIP: NEGATIVE
LYMPHOCYTES # BLD AUTO: 1.89 THOUSANDS/ΜL (ref 0.6–4.47)
LYMPHOCYTES NFR BLD AUTO: 34 % (ref 14–44)
MCH RBC QN AUTO: 28.3 PG (ref 26.8–34.3)
MCHC RBC AUTO-ENTMCNC: 33.7 G/DL (ref 31.4–37.4)
MCV RBC AUTO: 84 FL (ref 82–98)
MONOCYTES # BLD AUTO: 0.39 THOUSAND/ΜL (ref 0.17–1.22)
MONOCYTES NFR BLD AUTO: 7 % (ref 4–12)
NEUTROPHILS # BLD AUTO: 3.26 THOUSANDS/ΜL (ref 1.85–7.62)
NEUTS SEG NFR BLD AUTO: 57 % (ref 43–75)
NITRITE UR QL STRIP: NEGATIVE
NON-SQ EPI CELLS URNS QL MICRO: NORMAL /HPF
NRBC BLD AUTO-RTO: 0 /100 WBCS
PH UR STRIP.AUTO: 5.5 [PH]
PLATELET # BLD AUTO: 263 THOUSANDS/UL (ref 149–390)
PMV BLD AUTO: 9.6 FL (ref 8.9–12.7)
POTASSIUM SERPL-SCNC: 3.7 MMOL/L (ref 3.5–5.3)
PROT UR STRIP-MCNC: NEGATIVE MG/DL
RBC # BLD AUTO: 5.09 MILLION/UL (ref 3.81–5.12)
RBC #/AREA URNS AUTO: NORMAL /HPF
SODIUM SERPL-SCNC: 137 MMOL/L (ref 135–147)
SP GR UR STRIP.AUTO: 1 (ref 1–1.03)
UROBILINOGEN UR STRIP-ACNC: <2 MG/DL
WBC # BLD AUTO: 5.63 THOUSAND/UL (ref 4.31–10.16)
WBC #/AREA URNS AUTO: NORMAL /HPF

## 2024-11-03 PROCEDURE — 96374 THER/PROPH/DIAG INJ IV PUSH: CPT

## 2024-11-03 PROCEDURE — 36415 COLL VENOUS BLD VENIPUNCTURE: CPT | Performed by: EMERGENCY MEDICINE

## 2024-11-03 PROCEDURE — 85025 COMPLETE CBC W/AUTO DIFF WBC: CPT | Performed by: EMERGENCY MEDICINE

## 2024-11-03 PROCEDURE — 74176 CT ABD & PELVIS W/O CONTRAST: CPT

## 2024-11-03 PROCEDURE — 81001 URINALYSIS AUTO W/SCOPE: CPT | Performed by: EMERGENCY MEDICINE

## 2024-11-03 PROCEDURE — 99284 EMERGENCY DEPT VISIT MOD MDM: CPT

## 2024-11-03 PROCEDURE — 80048 BASIC METABOLIC PNL TOTAL CA: CPT | Performed by: EMERGENCY MEDICINE

## 2024-11-03 PROCEDURE — 99284 EMERGENCY DEPT VISIT MOD MDM: CPT | Performed by: EMERGENCY MEDICINE

## 2024-11-03 RX ORDER — KETOROLAC TROMETHAMINE 30 MG/ML
15 INJECTION, SOLUTION INTRAMUSCULAR; INTRAVENOUS ONCE
Status: COMPLETED | OUTPATIENT
Start: 2024-11-03 | End: 2024-11-03

## 2024-11-03 RX ADMIN — KETOROLAC TROMETHAMINE 15 MG: 30 INJECTION, SOLUTION INTRAMUSCULAR at 17:38

## 2024-11-03 NOTE — ED PROVIDER NOTES
Time reflects when diagnosis was documented in both MDM as applicable and the Disposition within this note       Time User Action Codes Description Comment    11/3/2024  7:52 PM Yaakov Lubin Add [M54.50] Acute low back pain without sciatica, unspecified back pain laterality           ED Disposition       ED Disposition   Discharge    Condition   Stable    Date/Time   Sun Nov 3, 2024  7:52 PM    Comment   Stefanie Cameron discharge to home/self care.                   Assessment & Plan       Medical Decision Making  69-year-old female, presents with back pain.  Differential diagnosis includes pyelonephritis, renal colic, muscle strain among other diagnoses.  CT scan and labs ordered in ED, patient receiving IV pain medication.    Imaging and lab results reviewed and discussed with patient.  No acute abnormality, patient's symptoms likely caused by muscle strain, arthritis and back.  Patient reports feeling better after receiving anti-inflammatory medication in ED.  Instructed to use over-the-counter anti-inflammatories as needed, ambulatory referral for comprehensive spine follow-up order placed.  Patient able to ambulate in ED.    Amount and/or Complexity of Data Reviewed  Labs: ordered. Decision-making details documented in ED Course.  Radiology: ordered. Decision-making details documented in ED Course.    Risk  Prescription drug management.        ED Course as of 11/03/24 2002   Sun Nov 03, 2024 2001 Results reviewed and discussed with patient.  No signs of acute infection.       Medications   ketorolac (TORADOL) injection 15 mg (15 mg Intravenous Given 11/3/24 1738)       ED Risk Strat Scores                           SBIRT 22yo+      Flowsheet Row Most Recent Value   Initial Alcohol Screen: US AUDIT-C     1. How often do you have a drink containing alcohol? 0 Filed at: 11/03/2024 1653   2. How many drinks containing alcohol do you have on a typical day you are drinking?  0 Filed at: 11/03/2024 1653   3b.  FEMALE Any Age, or MALE 65+: How often do you have 4 or more drinks on one occassion? 0 Filed at: 11/03/2024 1653   Audit-C Score 0 Filed at: 11/03/2024 1653   ROSE MARY: How many times in the past year have you...    Used an illegal drug or used a prescription medication for non-medical reasons? Never Filed at: 11/03/2024 1653                            History of Present Illness       Chief Complaint   Patient presents with    Back Pain     Pt c/o back pain that started on wednesday       Past Medical History:   Diagnosis Date    Diabetes mellitus (HCC)     Ear problems     Tinnitus     [place] teeth extracted 08/14/2020      History reviewed. No pertinent surgical history.   History reviewed. No pertinent family history.   Social History     Tobacco Use    Smoking status: Every Day     Current packs/day: 0.25     Types: Cigarettes    Smokeless tobacco: Never    Tobacco comments:     Down to 3 a day    Vaping Use    Vaping status: Never Used   Substance Use Topics    Alcohol use: No    Drug use: No      E-Cigarette/Vaping    E-Cigarette Use Never User       E-Cigarette/Vaping Substances    Nicotine No     THC No     CBD No     Flavoring No     Other No     Unknown No       I have reviewed and agree with the history as documented.     69-year-old female, presents with back pain.  Patient states she has been having sharp pain in bilateral mid to lower back over the past several days, became worse today.  Denies any known injury.  Pain worse with movement.  Denies any associated fevers, difficulty urinating, pain, numbness, weakness in legs.       History provided by:  Patient   used: No    Back Pain  Associated symptoms: no fever        Review of Systems   Constitutional: Negative.  Negative for fever.   Respiratory: Negative.     Cardiovascular: Negative.    Gastrointestinal: Negative.    Musculoskeletal:  Positive for back pain.   Neurological: Negative.            Objective       ED Triage Vitals    Temperature Pulse Blood Pressure Respirations SpO2 Patient Position - Orthostatic VS   11/03/24 1652 11/03/24 1652 11/03/24 1652 11/03/24 1652 11/03/24 1652 11/03/24 1652   97.6 °F (36.4 °C) 79 163/84 18 99 % Sitting      Temp Source Heart Rate Source BP Location FiO2 (%) Pain Score    11/03/24 1652 11/03/24 1652 11/03/24 1652 -- 11/03/24 1738    Temporal Monitor Left arm  8      Vitals      Date and Time Temp Pulse SpO2 Resp BP Pain Score FACES Pain Rating User   11/03/24 1930 -- 59 97 % 16 131/76 -- -- JK   11/03/24 1900 -- 64 99 % 16 146/78 -- -- JK   11/03/24 1800 -- 63 99 % 18 135/73 -- -- MI   11/03/24 1738 -- -- -- -- -- 8 -- TS   11/03/24 1652 97.6 °F (36.4 °C) 79 99 % 18 163/84 -- -- BS            Physical Exam  Vitals and nursing note reviewed.   Constitutional:       General: She is not in acute distress.  HENT:      Head: Normocephalic and atraumatic.   Cardiovascular:      Rate and Rhythm: Normal rate and regular rhythm.   Pulmonary:      Effort: Pulmonary effort is normal.      Breath sounds: Normal breath sounds.   Abdominal:      Palpations: Abdomen is soft.      Tenderness: There is no abdominal tenderness.   Musculoskeletal:         General: Normal range of motion.        Back:       Comments: Tenderness with spasm to bilateral back muscles.  No spinal tenderness.   Skin:     General: Skin is warm and dry.   Neurological:      General: No focal deficit present.      Mental Status: She is alert and oriented to person, place, and time.      Sensory: No sensory deficit.      Motor: No weakness.      Comments: 5 out of 5 strength bilateral lower extremities         Results Reviewed       Procedure Component Value Units Date/Time    Urine Microscopic [[id number]]  (Normal) Collected: 11/03/24 1814    Lab Status: Final result Specimen: Urine, Other Updated: 11/03/24 1827     RBC, UA None Seen /hpf      WBC, UA 1-2 /hpf      Epithelial Cells Occasional /hpf      Bacteria, UA None Seen /hpf     UA w  Reflex to Microscopic w Reflex to Culture [[id number]]  (Abnormal) Collected: 11/03/24 1814    Lab Status: Final result Specimen: Urine, Other Updated: 11/03/24 1826     Color, UA Colorless     Clarity, UA Clear     Specific Gravity, UA 1.005     pH, UA 5.5     Leukocytes, UA Negative     Nitrite, UA Negative     Protein, UA Negative mg/dl      Glucose,  (1/2%) mg/dl      Ketones, UA Negative mg/dl      Urobilinogen, UA <2.0 mg/dl      Bilirubin, UA Negative     Occult Blood, UA Negative    Basic metabolic panel [[id number]] Collected: 11/03/24 1737    Lab Status: Final result Specimen: Blood from Arm, Right Updated: 11/03/24 1803     Sodium 137 mmol/L      Potassium 3.7 mmol/L      Chloride 102 mmol/L      CO2 25 mmol/L      ANION GAP 10 mmol/L      BUN 9 mg/dL      Creatinine 0.80 mg/dL      Glucose 87 mg/dL      Calcium 9.5 mg/dL      eGFR 76 ml/min/1.73sq m     Narrative:      National Kid[employer] Foundation guidelines for Chronic Kidney Disease (CKD):     Stage 1 with normal or high GFR (GFR > 90 mL/min/1.73 square meters)    Stage 2 Mild CKD (GFR = 60-89 mL/min/1.73 square meters)    Stage 3A Moderate CKD (GFR = 45-59 mL/min/1.73 square meters)    Stage 3B Moderate CKD (GFR = 30-44 mL/min/1.73 square meters)    Stage 4 Severe CKD (GFR = 15-29 mL/min/1.73 square meters)    Stage 5 End Stage CKD (GFR <15 mL/min/1.73 square meters)  Note: GFR calculation is accurate only with a steady state creatinine    CBC and differential [[id number]] Collected: 11/03/24 1737    Lab Status: Final result Specimen: Blood from Arm, Right Updated: 11/03/24 1748     WBC 5.63 Thousand/uL      RBC 5.09 Million/uL      Hemoglobin 14.4 g/dL      Hematocrit 42.7 %      MCV 84 fL      MCH 28.3 pg      MCHC 33.7 g/dL      RDW 13.3 %      MPV 9.6 fL      Platelets 263 Thousands/uL      nRBC 0 /100 WBCs      Segmented % 57 %      Immature Grans % 0 %      Lymphocytes % 34 %      Monocytes % 7 %      Eosinophils Relative 1 %       Basophils Relative 1 %      Absolute Neutrophils 3.26 Thousands/µL      Absolute Immature Grans 0.01 Thousand/uL      Absolute Lymphocytes 1.89 Thousands/µL      Absolute Monocytes 0.39 Thousand/µL      Eosinophils Absolute 0.04 Thousand/µL      Basophils Absolute 0.04 Thousands/µL             CT recon only lumbar spine   Final Interpretation by Yuriy Dean MD ([id number])      1. No fracture or traumatic subluxation.   2. Spondylosis as discussed above with moderate central stenosis at L4-5 due to severe facet arthropathy, grade 1 anterolisthesis, disc bulge, and ligamentum flavum redundancy. There is also severe left foraminal stenosis at L5-S1 due to marginal    osteophytes and gas-containing disc herniation versus foraminal synovial cyst. Please see above discussion for additional details.               Workstation performed: [id number]         CT abdomen pelvis wo contrast   Final Interpretation by Cisco Li MD ([id number])      1.  No identifiable acute abnormality to account for the patient's clinical presentation.   2.  Multilevel lumbar spondylosis.      Workstation performed: [id number]             Procedures    ED Medication and Procedure Management   Prior to Admission Medications   Prescriptions Last Dose Informant Patient Reported? Taking?   Finerenone (Kerendia) 20 MG TABS   Yes No   Sig: Take 20 mg by mouth in the morning   Glucose Blood (BLOOD GLUCOSE TEST STRIPS) STRP   Yes No   Sig: Check BG BID   Lancets (OneTouch Delica Plus [id number]) MISC   Yes No   Sig: Use 2 (two) times a day   Xigduo XR  MG TB24   No No   Sig: Take 1 tablet by mouth in the morning   ascorbic acid (VITAMIN C) 1000 MG tablet   Yes No   Sig: Take 1,000 mg by mouth Three times a day   ergocalciferol (VITAMIN D2) 50,000 units   Yes No   Sig: Take 50,000 Units by mouth   fluticasone (FLONASE) 50 mcg/act nasal spray   No No   Si sprays into each nostril daily   hydroquinone 4 % cream   Yes No   Sig:  APPLY TO BOTH LEGS EVERY DAY   ketoconazole (NIZORAL) 2 % cream   Yes No   Sig: APPLY AS DIRECTED DAILY TO RASH   losartan (COZAAR) 25 mg tablet   Yes No   Sig: Take 25 mg by mouth daily   pravastatin (PRAVACHOL) 40 mg tablet   No No   Sig: Take 1 tablet (40 mg total) by mouth daily   pyridoxine (VITAMIN B6) 50 mg tablet   Yes No   Sig: Take 50 mg by mouth daily   vitamin B-12 (VITAMIN B-12) 1,000 mcg tablet   Yes No   Sig: Take 1,000 mcg by mouth daily      Facility-Administered Medications: None     Patient's Medications   Discharge Prescriptions    No medications on file       ED SEPSIS DOCUMENTATION   Time reflects when diagnosis was documented in both MDM as applicable and the Disposition within this note       Time User Action Codes Description Comment    11/3/2024  7:52 PM Yaakov Lubin Add [M54.50] Acute low back pain without sciatica, unspecified back pain laterality                  Yaakov Lubin MD  11/03/24 2002

## 2024-11-04 ENCOUNTER — TELEPHONE (OUTPATIENT)
Age: 69
End: 2024-11-04

## 2024-11-04 DIAGNOSIS — M54.40 ACUTE RIGHT-SIDED LOW BACK PAIN WITH SCIATICA, SCIATICA LATERALITY UNSPECIFIED: Primary | ICD-10-CM

## 2024-11-04 RX ORDER — PREDNISONE 20 MG/1
20 TABLET ORAL 2 TIMES DAILY WITH MEALS
Qty: 6 TABLET | Refills: 0 | Status: SHIPPED | OUTPATIENT
Start: 2024-11-04 | End: 2024-11-07

## 2024-11-04 NOTE — TELEPHONE ENCOUNTER
Spoke to patient she is also having fevers this morning at 5am it was 101.6 and she is worried because the ER doctor told her to watch for fevers. She has been in so much pain she has been sitting in the shower with the hot water running on her.  Patient has an appt with you tomorrow but she was told you want to see her on Friday, she is asking which day you want to see her?

## 2024-11-04 NOTE — TELEPHONE ENCOUNTER
Please let her know I sent over some prednisone for the next few days her sugars may be a bit elevated not too worried that is expected and if she wants she can come back and see me on Friday after she takes the 3 days of the steroids

## 2024-11-04 NOTE — TELEPHONE ENCOUNTER
Patient states she was in the ED last night for severe back pain. Scheduled for OVL tomorrow, states pain is excruciating and meds from ED have worn off. She is wondering if Becca can call something in to help until appointment tomorrow. Patient requests call back to advise.

## 2024-11-05 ENCOUNTER — TELEPHONE (OUTPATIENT)
Dept: PHYSICAL THERAPY | Facility: OTHER | Age: 69
End: 2024-11-05

## 2024-11-05 NOTE — TELEPHONE ENCOUNTER
Call placed to the patient per [facility] referral.    After explanation of the program the patient is refusing at this time. Patient was provided with our ph# and hours of business.     Patient encouraged to contract us if needed.    Referral Closed.

## 2024-11-08 ENCOUNTER — OFFICE VISIT (OUTPATIENT)
Dept: FAMILY MEDICINE CLINIC | Facility: CLINIC | Age: 69
End: 2024-11-08
Payer: MEDICARE

## 2024-11-08 VITALS
SYSTOLIC BLOOD PRESSURE: 120 MMHG | TEMPERATURE: 97.8 F | OXYGEN SATURATION: 98 % | BODY MASS INDEX: 20.56 KG/M2 | WEIGHT: 131 LBS | HEIGHT: 67 IN | HEART RATE: 74 BPM | DIASTOLIC BLOOD PRESSURE: 62 MMHG

## 2024-11-08 DIAGNOSIS — E11.42 TYPE 2 DIABETES MELLITUS WITH DIABETIC POLYNEUROPATHY, WITHOUT LONG-TERM CURRENT USE OF INSULIN (HCC): ICD-10-CM

## 2024-11-08 DIAGNOSIS — N18.2 CKD (CHRONIC KIDNEY DISEASE) STAGE 2, GFR 60-89 ML/MIN: ICD-10-CM

## 2024-11-08 DIAGNOSIS — M48.061 SPINAL STENOSIS OF LUMBAR REGION WITHOUT NEUROGENIC CLAUDICATION: Primary | ICD-10-CM

## 2024-11-08 DIAGNOSIS — E78.5 DYSLIPIDEMIA: ICD-10-CM

## 2024-11-08 DIAGNOSIS — M54.40 ACUTE RIGHT-SIDED LOW BACK PAIN WITH SCIATICA, SCIATICA LATERALITY UNSPECIFIED: ICD-10-CM

## 2024-11-08 PROBLEM — E43 SEVERE PROTEIN-CALORIE MALNUTRITION (HCC): Status: ACTIVE | Noted: 2024-11-08

## 2024-11-08 PROBLEM — D75.1 POLYCYTHEMIA: Status: ACTIVE | Noted: 2024-10-22

## 2024-11-08 PROCEDURE — 99214 OFFICE O/P EST MOD 30 MIN: CPT | Performed by: NURSE PRACTITIONER

## 2024-11-08 PROCEDURE — G2211 COMPLEX E/M VISIT ADD ON: HCPCS | Performed by: NURSE PRACTITIONER

## 2024-11-08 RX ORDER — TRAMADOL HYDROCHLORIDE 50 MG/1
50 TABLET ORAL EVERY 8 HOURS PRN
Qty: 30 TABLET | Refills: 1 | Status: SHIPPED | OUTPATIENT
Start: 2024-11-08 | End: 2024-11-18

## 2024-11-08 RX ORDER — TIZANIDINE HYDROCHLORIDE 2 MG/1
2 CAPSULE, GELATIN COATED ORAL 3 TIMES DAILY PRN
Qty: 30 CAPSULE | Refills: 0 | Status: SHIPPED | OUTPATIENT
Start: 2024-11-08 | End: 2024-11-22

## 2024-11-08 RX ORDER — PREDNISONE 20 MG/1
TABLET ORAL
Qty: 9 TABLET | Refills: 0 | Status: SHIPPED | OUTPATIENT
Start: 2024-11-08 | End: 2024-11-14

## 2024-11-08 NOTE — PROGRESS NOTES
Ambulatory Visit  Name: Stefanie Cameron      : 1955      MRN: [id number]  Encounter Provider: MARSHALL Patricia  Encounter Date: 2024   Encounter department: [facility]    Assessment & Plan  Spinal stenosis of lumbar region without neurogenic claudication  Discussed treatment options and supportive measures   Orders:    Ambulatory Referral to Neurosurgery; Future    Ambulatory Referral to [facility]; Future    traMADol (Ultram) 50 mg tablet; Take 1 tablet (50 mg total) by mouth every 8 (eight) hours as needed for moderate pain for up to 10 days    TiZANidine (ZANAFLEX) 2 MG capsule; Take 1 capsule (2 mg total) by mouth 3 (three) times a day as needed for muscle spasms for up to 10 days    predniSONE 20 mg tablet; Take 1 tablet (20 mg total) by mouth 2 (two) times a day with meals for 3 days, THEN 1 tablet (20 mg total) daily for 3 days.    Acute right-sided low back pain with sciatica, sciatica laterality unspecified    Orders:    Ambulatory Referral to Neurosurgery; Future    Ambulatory Referral to [facility]; Future    traMADol (Ultram) 50 mg tablet; Take 1 tablet (50 mg total) by mouth every 8 (eight) hours as needed for moderate pain for up to 10 days    TiZANidine (ZANAFLEX) 2 MG capsule; Take 1 capsule (2 mg total) by mouth 3 (three) times a day as needed for muscle spasms for up to 10 days    predniSONE 20 mg tablet; Take 1 tablet (20 mg total) by mouth 2 (two) times a day with meals for 3 days, THEN 1 tablet (20 mg total) daily for 3 days.      Falls Plan of Care: balance, strength, and gait training instructions were provided. Recommended assistive device to help with gait and balance. Medications that increase falls were reviewed. Assessed feet and footwear. Vitamin D supplementation was recommended. Home safety education provided.       History of Present Illness     Pt is a 69 yr old female   Presents in office for follow up on low  back pain and sciatica   Post ER follow up symptoms on and off for a week no injuries but does have history of low back pain wear and tear         History obtained from : patient  Review of Systems   Constitutional:  Positive for unexpected weight change (weight loss over 20 lbs however has been stable at 130 lbs). Negative for fatigue.   HENT:  Negative for congestion, postnasal drip and sore throat.    Respiratory:  Negative for cough and shortness of breath.    Cardiovascular:  Negative for chest pain and palpitations.   Gastrointestinal:  Negative for abdominal distention, abdominal pain, diarrhea, nausea and vomiting.        GERD   Weight loss has stabilized we had discussed follow up with GI    Endocrine:        Stable Type 2 diabetes - stable    Genitourinary:  Negative for difficulty urinating and flank pain.        CKD under care of Nephrology    Musculoskeletal:  Positive for arthralgias, back pain, gait problem and neck stiffness. Negative for myalgias.        Low back pain and sciatica down right hip    Skin:  Negative for rash.   Neurological:  Negative for headaches.   Hematological:  Negative for adenopathy.   Psychiatric/Behavioral:  Negative for sleep disturbance and suicidal ideas. The patient is not nervous/anxious.      Pertinent Medical History   Reviewed       Medical History Reviewed by provider this encounter:  Tobacco  Allergies  Meds  Problems  Med Hx  Surg Hx  Fam Hx     .  Past Medical History   Past Medical History:   Diagnosis Date    Diabetes mellitus (HCC)     Ear problems     Tinnitus     [place] teeth extracted 08/14/2020     History reviewed. No pertinent surgical history.  History reviewed. No pertinent family history.   reports that she has been smoking cigarettes. She has never used smokeless tobacco. She reports that she does not drink alcohol and does not use drugs.  Current Outpatient Medications on File Prior to Visit   Medication Sig Dispense Refill    ascorbic acid  (VITAMIN C) 1000 MG tablet Take 1,000 mg by mouth Three times a day      ergocalciferol (VITAMIN D2) 50,000 units Take 50,000 Units by mouth      Finerenone (Kerendia) 20 MG TABS Take 20 mg by mouth in the morning      fluticasone (FLONASE) 50 mcg/act nasal spray 2 sprays into each nostril daily 16 g 5    Glucose Blood (BLOOD GLUCOSE TEST STRIPS) STRP Check BG BID      hydroquinone 4 % cream APPLY TO BOTH LEGS EVERY DAY      ketoconazole (NIZORAL) 2 % cream APPLY AS DIRECTED DAILY TO RASH      Lancets (OneTouch Delica Plus [id number]) MISC Use 2 (two) times a day      losartan (COZAAR) 25 mg tablet Take 25 mg by mouth daily      pyridoxine (VITAMIN B6) 50 mg tablet Take 50 mg by mouth daily      vitamin B-12 (VITAMIN B-12) 1,000 mcg tablet Take 1,000 mcg by mouth daily      pravastatin (PRAVACHOL) 40 mg tablet Take 1 tablet (40 mg total) by mouth daily 90 tablet 1    Xigduo XR  MG TB24 Take 1 tablet by mouth in the morning 90 tablet 1     No current facility-administered medications on file prior to visit.   No Known Allergies   Current Outpatient Medications on File Prior to Visit   Medication Sig Dispense Refill    ascorbic acid (VITAMIN C) 1000 MG tablet Take 1,000 mg by mouth Three times a day      ergocalciferol (VITAMIN D2) 50,000 units Take 50,000 Units by mouth      Finerenone (Kerendia) 20 MG TABS Take 20 mg by mouth in the morning      fluticasone (FLONASE) 50 mcg/act nasal spray 2 sprays into each nostril daily 16 g 5    Glucose Blood (BLOOD GLUCOSE TEST STRIPS) STRP Check BG BID      hydroquinone 4 % cream APPLY TO BOTH LEGS EVERY DAY      ketoconazole (NIZORAL) 2 % cream APPLY AS DIRECTED DAILY TO RASH      Lancets (OneTouch Delica Plus [id number]) MISC Use 2 (two) times a day      losartan (COZAAR) 25 mg tablet Take 25 mg by mouth daily      pyridoxine (VITAMIN B6) 50 mg tablet Take 50 mg by mouth daily      vitamin B-12 (VITAMIN B-12) 1,000 mcg tablet Take 1,000 mcg by mouth daily       "pravastatin (PRAVACHOL) 40 mg tablet Take 1 tablet (40 mg total) by mouth daily 90 tablet 1    Xigduo XR  MG TB24 Take 1 tablet by mouth in the morning 90 tablet 1     No current facility-administered medications on file prior to visit.      Social History     Tobacco Use    Smoking status: Every Day     Current packs/day: 0.25     Types: Cigarettes    Smokeless tobacco: Never    Tobacco comments:     Down to 3 a day    Vaping Use    Vaping status: Never Used   Substance and Sexual Activity    Alcohol use: No    Drug use: No    Sexual activity: Not on file         Objective     /62 (BP Location: Left arm, Patient Position: Sitting, Cuff Size: Adult)   Pulse 74   Temp 97.8 °F (36.6 °C)   Ht 5' 7\" (1.702 m)   Wt 59.4 kg (131 lb)   SpO2 98%   BMI 20.52 kg/m²     Physical Exam  Vitals and nursing note reviewed.   Constitutional:       Appearance: Normal appearance.   HENT:      Head: Normocephalic and atraumatic.   Eyes:      Extraocular Movements: Extraocular movements intact.   Cardiovascular:      Rate and Rhythm: Normal rate and regular rhythm.      Pulses: Normal pulses.      Heart sounds: Normal heart sounds.   Pulmonary:      Effort: Pulmonary effort is normal.      Breath sounds: Normal breath sounds.   Abdominal:      Palpations: Abdomen is soft.   Musculoskeletal:         General: Tenderness present.      Cervical back: Normal range of motion.      Right lower leg: No edema.      Left lower leg: No edema.      Comments: Low back pain with radiating pain  Difficulty with walking   Unable to stand for too long or sit for too long   Skin:     General: Skin is warm.      Capillary Refill: Capillary refill takes less than 2 seconds.   Neurological:      Mental Status: She is alert and oriented to person, place, and time.   Psychiatric:         Mood and Affect: Mood normal.         Behavior: Behavior normal.     CT abdomen pelvis wo contrast  Status: Final result     PACS Images     Show images " for CT abdomen pelvis wo contrast  Study Result    Narrative & Impression   CT ABDOMEN AND PELVIS WITHOUT IV CONTRAST     INDICATION: Back/flank pain.     COMPARISON: CT AP 3/19/2024     TECHNIQUE: CT examination of the abdomen and pelvis was performed without intravenous contrast. Multiplanar 2D reformatted images were created from the source data.     This examination, like all CT scans performed in the [facility], was performed utilizing techniques to minimize radiation dose exposure, including the use of iterative reconstruction and automated exposure control. Radiation dose length   product (DLP) for this visit: 567 mGy-cm     Enteric Contrast: Not administered.     FINDINGS:  Evaluation of the parenchymal organs, mucosa and urothelium is significantly limited without intravenous contrast.     ABDOMEN     LOWER CHEST: Chronic mild to moderate bibasilar multifocal atelectasis and/or scarring.     LIVER/BILIARY TREE: Scattered small hypodensities throughout the liver, too small for definitive characterization. While nonspecific, this appearance statistically favors benign cyst(s). The liver is otherwise unremarkable.     GALLBLADDER: No calcified gallstones. No pericholecystic inflammatory change.     SPLEEN: Unremarkable.     PANCREAS: Unremarkable.     ADRENAL GLANDS: Unremarkable.     KIDNEYS/URETERS: Simple fluid density cyst within the medial upper pole of the right kidney, incompletely assessed without IV contrast. The kidneys and ureters are otherwise unremarkable.     STOMACH AND BOWEL: The stomach and small bowel are unremarkable. There are numerous colonic diverticula, without evidence of acute inflammation.     APPENDIX: No findings to suggest appendicitis.     ABDOMINOPELVIC CAVITY: No ascites. No pneumoperitoneum. No lymphadenopathy.     VESSELS: Moderate diffuse atherosclerotic changes/calcifications throughout the aorta and central branch vessels. The IVC is grossly unremarkable.      PELVIS     REPRODUCTIVE ORGANS: Unremarkable for patient's age.     URINARY BLADDER: Nearly empty and cannot be adequately evaluated. No visible calculi.     ABDOMINAL WALL/INGUINAL REGIONS: Small fat-containing umbilical hernia.     BONES: Advanced multilevel lumbar spondylosis, with severe disc space narrowing and disc degeneration at L5-S1. There is a grade 1 anterolisthesis of L4 on L5, due to advanced multilevel lower lumbar facet arthropathy.     IMPRESSION:     1.  No identifiable acute abnormality to account for the patient's clinical presentation.  2.  Multilevel lumbar spondylosis.     Workstation performed: [id number]     Administrative Statements   I have spent a total time of 45  minutes in caring for this patient on the day of the visit/encounter including Diagnostic results, Prognosis, Risks and benefits of tx options, Instructions for management, Patient and family education, Importance of tx compliance, Risk factor reductions, Impressions, Counseling / Coordination of care, Documenting in the medical record, Reviewing / ordering tests, medicine, procedures  , and Obtaining or reviewing history  . Topics discussed with the patient / family include symptom assessment and management, medication review, and goals of care.

## 2024-11-14 PROBLEM — E43 SEVERE PROTEIN-CALORIE MALNUTRITION (HCC): Status: RESOLVED | Noted: 2024-11-08 | Resolved: 2024-11-14

## 2024-11-22 ENCOUNTER — CONSULT (OUTPATIENT)
Dept: NEUROSURGERY | Facility: CLINIC | Age: 69
End: 2024-11-22
Payer: MEDICARE

## 2024-11-22 VITALS
RESPIRATION RATE: 16 BRPM | HEART RATE: 90 BPM | HEIGHT: 67 IN | WEIGHT: 131 LBS | SYSTOLIC BLOOD PRESSURE: 120 MMHG | TEMPERATURE: 97.8 F | DIASTOLIC BLOOD PRESSURE: 62 MMHG | BODY MASS INDEX: 20.56 KG/M2 | OXYGEN SATURATION: 96 %

## 2024-11-22 DIAGNOSIS — F17.210 CIGARETTE NICOTINE DEPENDENCE WITHOUT COMPLICATION: ICD-10-CM

## 2024-11-22 DIAGNOSIS — M48.061 SPINAL STENOSIS OF LUMBAR REGION WITHOUT NEUROGENIC CLAUDICATION: Primary | ICD-10-CM

## 2024-11-22 DIAGNOSIS — M54.40 ACUTE RIGHT-SIDED LOW BACK PAIN WITH SCIATICA, SCIATICA LATERALITY UNSPECIFIED: ICD-10-CM

## 2024-11-22 PROCEDURE — 99204 OFFICE O/P NEW MOD 45 MIN: CPT | Performed by: NURSE PRACTITIONER

## 2024-11-22 NOTE — ASSESSMENT & PLAN NOTE
Presents as referral from PCP for evaluation of lumbar degenerative disease and spinal stenosis  Acute onset of abdominal/flank and back pain in early November.  Evaluated in ED and CTCAP completed indicating lumbar spondylosis and facet arthropathy.  Completed a course of steroids that was helpful.  On current exam, no motor weakness. Continues with lumbar pain and occasional N/T to bilateral toes. Some chronic urinary and fecal incontinence.    Imaging:  CT recon lumbar spine 11/3/24: 1. No fracture or traumatic subluxation. 2. Spondylosis as discussed above with moderate central stenosis at L4-5 due to severe facet arthropathy, grade 1 anterolisthesis, disc bulge, and ligamentum flavum redundancy. There is also severe left foraminal stenosis at L5-S1 due to marginal osteophytes and gas-containing disc herniation versus foraminal synovial cyst.    Plan:  Reviewed imaging extensively with patient.  Discussed natural history of degenerative disease and symptoms that can arise.  Recommend conservative management of symptoms including PT, chiropractor, accupuncture.  She has been referred to spine and pain.  Advised OTC anti-inflammatories could be helpful.   Follow up as needed for new or worsening symptoms.    Orders:    Ambulatory Referral to Neurosurgery    Ambulatory Referral to Physical Therapy; Future    Ambulatory referral to Chiropractic; Future

## 2024-11-22 NOTE — PROGRESS NOTES
Name: Stefanie Cameron      : 1955      MRN: [id number]  Encounter Provider: MARSHALL Mata  Encounter Date: 2024   Encounter department: [facility] NEUROSURGICAL [facility]  :  Assessment & Plan  Spinal stenosis of lumbar region without neurogenic claudication  Presents as referral from PCP for evaluation of lumbar degenerative disease and spinal stenosis  Acute onset of abdominal/flank and back pain in early November.  Evaluated in ED and CTCAP completed indicating lumbar spondylosis and facet arthropathy.  Completed a course of steroids that was helpful.  On current exam, no motor weakness. Continues with lumbar pain and occasional N/T to bilateral toes. Some chronic urinary and fecal incontinence.    Imaging:  CT recon lumbar spine 11/3/24: 1. No fracture or traumatic subluxation. 2. Spondylosis as discussed above with moderate central stenosis at L4-5 due to severe facet arthropathy, grade 1 anterolisthesis, disc bulge, and ligamentum flavum redundancy. There is also severe left foraminal stenosis at L5-S1 due to marginal osteophytes and gas-containing disc herniation versus foraminal synovial cyst.    Plan:  Reviewed imaging extensively with patient.  Discussed natural history of degenerative disease and symptoms that can arise.  Recommend conservative management of symptoms including PT, chiropractor, accupuncture.  She has been referred to spine and pain.  Advised OTC anti-inflammatories could be helpful.   Follow up as needed for new or worsening symptoms.    Orders:    Ambulatory Referral to Neurosurgery    Ambulatory Referral to Physical Therapy; Future    Ambulatory referral to Chiropractic; Future    Acute right-sided low back pain with sciatica, sciatica laterality unspecified    Orders:    Ambulatory Referral to Neurosurgery    Cigarette nicotine dependence without complication  Current active smoker.             History of Present Illness     69-year-old female with past  "medical history of DMII, HTN, HLD, active smoking, who presents as referral from PCP for evaluation of low back pain. She relates that in the beginning of November, she developed sudden onset abdominal and flank pain radiating into her back. She thought it \"gas\" and felt better after having a bowel movement and burping. She then had another episode on 11/3 where she describes feeling \"paralyzed\" from her neck to her pelvis with associated back pain. She was evaluated for this in the emergency department at [facility]. CT CAP was completed and indicated diffuse lumbar degenerative disease with spondylosis and arthropathy. She was referred to spine and pain management.    She was prescribed a steroid taper by her PCP which was helpful. She continues to endorse pain to her low back that is worse with bending along with R flank burning and occasional tingling from the back of her buttocks to her anterior thighs and toes. She does work with an acupuncturist. She has had longstanding bowel or bladder issues since childbirth. 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      Review of Systems   Respiratory:  Negative for chest tightness and shortness of breath.    Genitourinary:         Reports some new BBI    Doesn't feel bladder is full   Musculoskeletal:  Positive for back pain (occasionally radiates to legs).   Neurological:  Positive for weakness (generalized) and numbness (intermittent tingling in thighs).    I have personally reviewed the MA's review of systems and made changes as necessary.    Past Medical History   Past Medical History:   Diagnosis Date    Diabetes mellitus (HCC)     Ear problems     Tinnitus     Romeo teeth extracted 08/14/2020     Past Surgical History:   Procedure Laterality Date    BREAST BIOPSY       No family history on file.   reports that she has been smoking cigarettes. She has never used smokeless tobacco. She reports that she does not drink alcohol and does not use drugs.  Current " "Outpatient Medications on File Prior to Visit   Medication Sig Dispense Refill    ascorbic acid (VITAMIN C) 1000 MG tablet Take 1,000 mg by mouth Three times a day      ergocalciferol (VITAMIN D2) 50,000 units Take 50,000 Units by mouth every 14 (fourteen) days      Finerenone (Kerendia) 20 MG TABS Take 20 mg by mouth in the morning      fluticasone (FLONASE) 50 mcg/act nasal spray 2 sprays into each nostril daily 16 g 5    ketoconazole (NIZORAL) 2 % cream APPLY AS DIRECTED DAILY TO RASH      losartan (COZAAR) 25 mg tablet Take 25 mg by mouth daily      pravastatin (PRAVACHOL) 40 mg tablet Take 1 tablet (40 mg total) by mouth daily 90 tablet 1    pyridoxine (VITAMIN B6) 50 mg tablet Take 50 mg by mouth daily      Thiamine HCl (B-1 PO) Take by mouth daily      TiZANidine (ZANAFLEX) 2 MG capsule Take 1 capsule (2 mg total) by mouth 3 (three) times a day as needed for muscle spasms for up to 10 days 30 capsule 0    vitamin B-12 (VITAMIN B-12) 1,000 mcg tablet Take 1,000 mcg by mouth daily      Xigduo XR  MG TB24 Take 1 tablet by mouth in the morning 90 tablet 1    Glucose Blood (BLOOD GLUCOSE TEST STRIPS) STRP Check BG BID      hydroquinone 4 % cream APPLY TO BOTH LEGS EVERY DAY (Patient not taking: Reported on 11/22/2024)      Lancets (OneTouch Delica Plus [id number]) MISC Use 2 (two) times a day       No current facility-administered medications on file prior to visit.   No Known Allergies   Pertinent Medical History             Objective   /62 (BP Location: Left arm, Patient Position: Sitting, Cuff Size: Standard)   Pulse 90   Temp 97.8 °F (36.6 °C) (Temporal)   Resp 16   Ht 5' 7\" (1.702 m)   Wt 59.4 kg (131 lb)   SpO2 96%   BMI 20.52 kg/m²     Physical Exam  Constitutional:       Appearance: She is well-developed.   HENT:      Head: Normocephalic and atraumatic.   Eyes:      Extraocular Movements: EOM normal.      Pupils: Pupils are equal, round, and reactive to light.   Pulmonary:      Effort: " Pulmonary effort is normal.   Abdominal:      Palpations: Abdomen is soft.   Musculoskeletal:         General: Normal range of motion.      Cervical back: Normal range of motion and neck supple.   Skin:     General: Skin is warm and dry.   Neurological:      Mental Status: She is alert and oriented to person, place, and time.      Motor: Motor strength is normal.     Coordination: Finger-Nose-Finger Test normal.      Gait: Gait is intact.   Psychiatric:         Speech: Speech normal.       Neurologic Exam     Mental Status   Oriented to person, place, and time.   Oriented to person.   Oriented to place.   Oriented to time.   Registration: recalls 3 of 3 objects. Follows 1 step commands.   Attention: normal. Concentration: normal.   Speech: speech is normal   Level of consciousness: alert  Knowledge: good and consistent with education. Able to perform simple calculations.   Able to name object.     Cranial Nerves     CN II   Visual acuity: normal  Right visual field deficit: none  Left visual field deficit: none     CN III, IV, VI   Pupils are equal, round, and reactive to light.  Extraocular motions are normal.   Right pupil: Size: 3 mm. Shape: regular. Reactivity: brisk. Consensual response: intact. Accommodation: intact.   Left pupil: Size: 3 mm. Shape: regular. Reactivity: brisk. Consensual response: intact. Accommodation: intact.   CN III: no CN III palsy  CN VI: no CN VI palsy  Nystagmus: none   Conjugate gaze: present    CN V   Right facial sensation deficit: none  Left facial sensation deficit: none    CN VII   Right facial weakness: none  Left facial weakness: none    CN VIII   Hearing: intact    CN IX, X   Palate: symmetric    CN XI   Right sternocleidomastoid strength: normal  Left sternocleidomastoid strength: normal  Right trapezius strength: normal  Left trapezius strength: normal    CN XII   Tongue: not atrophic  Fasciculations: absent  Tongue deviation: none    Motor Exam   Muscle bulk:  normal  Overall muscle tone: normal    Strength   Strength 5/5 throughout.     Sensory Exam   Light touch normal.     Gait, Coordination, and Reflexes     Gait  Gait: normal    Coordination   Finger to nose coordination: normal    Tremor   Resting tremor: absent  Intention tremor: absent  Action tremor: absent    Reflexes   Reflexes 2+ except as noted.   Right Soriano: absent  Left Soriano: absent  Right ankle clonus: absent  Left ankle clonus: absent      SL AMB Radiology Results Review: I personally reviewed the following image studies in PACS and associated radiology reports: CT CAP/lumbar recon. My interpretation of the radiology images/reports is: as above.    Administrative Statements   I have spent a total time of 45 minutes in caring for this patient on the day of the visit/encounter including Diagnostic results, Risks and benefits of tx options, Instructions for management, Patient and family education, Importance of tx compliance, Risk factor reductions, Impressions, Documenting in the medical record, Reviewing / ordering tests, medicine, procedures  , and Obtaining or reviewing history  .

## 2024-12-03 ENCOUNTER — OFFICE VISIT (OUTPATIENT)
Dept: FAMILY MEDICINE CLINIC | Facility: CLINIC | Age: 69
End: 2024-12-03
Payer: MEDICARE

## 2024-12-03 VITALS
HEART RATE: 80 BPM | DIASTOLIC BLOOD PRESSURE: 60 MMHG | TEMPERATURE: 99 F | WEIGHT: 129 LBS | HEIGHT: 67 IN | OXYGEN SATURATION: 98 % | SYSTOLIC BLOOD PRESSURE: 122 MMHG | BODY MASS INDEX: 20.25 KG/M2

## 2024-12-03 DIAGNOSIS — J32.0 CHRONIC MAXILLARY SINUSITIS: ICD-10-CM

## 2024-12-03 DIAGNOSIS — J32.2 CHRONIC ETHMOIDAL SINUSITIS: ICD-10-CM

## 2024-12-03 DIAGNOSIS — R09.81 NASAL CONGESTION: ICD-10-CM

## 2024-12-03 DIAGNOSIS — J32.3 CHRONIC SPHENOIDAL SINUSITIS: ICD-10-CM

## 2024-12-03 DIAGNOSIS — H93.13 TINNITUS OF BOTH EARS: ICD-10-CM

## 2024-12-03 DIAGNOSIS — N18.2 CKD (CHRONIC KIDNEY DISEASE) STAGE 2, GFR 60-89 ML/MIN: ICD-10-CM

## 2024-12-03 DIAGNOSIS — E78.5 DYSLIPIDEMIA: ICD-10-CM

## 2024-12-03 DIAGNOSIS — I10 ESSENTIAL HYPERTENSION: ICD-10-CM

## 2024-12-03 DIAGNOSIS — J34.3 HYPERTROPHY OF INFERIOR NASAL TURBINATE: ICD-10-CM

## 2024-12-03 DIAGNOSIS — J30.89 NON-SEASONAL ALLERGIC RHINITIS DUE TO OTHER ALLERGIC TRIGGER: ICD-10-CM

## 2024-12-03 DIAGNOSIS — J32.1 CHRONIC FRONTAL SINUSITIS: ICD-10-CM

## 2024-12-03 DIAGNOSIS — M54.40 ACUTE RIGHT-SIDED LOW BACK PAIN WITH SCIATICA, SCIATICA LATERALITY UNSPECIFIED: ICD-10-CM

## 2024-12-03 DIAGNOSIS — Z71.84 TRAVEL ADVICE ENCOUNTER: ICD-10-CM

## 2024-12-03 DIAGNOSIS — M48.061 SPINAL STENOSIS OF LUMBAR REGION WITHOUT NEUROGENIC CLAUDICATION: ICD-10-CM

## 2024-12-03 DIAGNOSIS — M54.50 ACUTE LOW BACK PAIN, UNSPECIFIED BACK PAIN LATERALITY, UNSPECIFIED WHETHER SCIATICA PRESENT: Primary | ICD-10-CM

## 2024-12-03 DIAGNOSIS — E11.42 TYPE 2 DIABETES MELLITUS WITH DIABETIC POLYNEUROPATHY, WITHOUT LONG-TERM CURRENT USE OF INSULIN (HCC): ICD-10-CM

## 2024-12-03 PROCEDURE — G2211 COMPLEX E/M VISIT ADD ON: HCPCS | Performed by: NURSE PRACTITIONER

## 2024-12-03 PROCEDURE — 99214 OFFICE O/P EST MOD 30 MIN: CPT | Performed by: NURSE PRACTITIONER

## 2024-12-04 RX ORDER — ONDANSETRON 4 MG/1
4 TABLET, ORALLY DISINTEGRATING ORAL EVERY 6 HOURS PRN
Qty: 30 TABLET | Refills: 0 | Status: SHIPPED | OUTPATIENT
Start: 2024-12-04 | End: 2024-12-09

## 2024-12-04 RX ORDER — METHYLPREDNISOLONE 4 MG/1
TABLET ORAL
Qty: 21 EACH | Refills: 0 | Status: SHIPPED | OUTPATIENT
Start: 2024-12-04

## 2024-12-04 RX ORDER — LOSARTAN POTASSIUM 25 MG/1
25 TABLET ORAL DAILY
Qty: 90 TABLET | Refills: 0 | Status: SHIPPED | OUTPATIENT
Start: 2024-12-04 | End: 2025-03-04

## 2024-12-04 RX ORDER — PRAVASTATIN SODIUM 40 MG
40 TABLET ORAL DAILY
Qty: 90 TABLET | Refills: 1 | Status: SHIPPED | OUTPATIENT
Start: 2024-12-04 | End: 2025-03-04

## 2024-12-04 RX ORDER — DAPAGLIFLOZIN AND METFORMIN HYDROCHLORIDE 10; 1000 MG/1; MG/1
1 TABLET, FILM COATED, EXTENDED RELEASE ORAL DAILY
Qty: 90 TABLET | Refills: 1 | Status: SHIPPED | OUTPATIENT
Start: 2024-12-04 | End: 2025-03-04

## 2024-12-04 RX ORDER — FLUTICASONE PROPIONATE 50 MCG
2 SPRAY, SUSPENSION (ML) NASAL DAILY
Qty: 16 G | Refills: 5 | Status: SHIPPED | OUTPATIENT
Start: 2024-12-04

## 2024-12-04 RX ORDER — TIZANIDINE HYDROCHLORIDE 2 MG/1
2 CAPSULE, GELATIN COATED ORAL 3 TIMES DAILY PRN
Qty: 30 CAPSULE | Refills: 0 | Status: SHIPPED | OUTPATIENT
Start: 2024-12-04 | End: 2024-12-14

## 2024-12-04 NOTE — ASSESSMENT & PLAN NOTE
Hyperlipidemia diagnoses assessed and discussed   Reviewed medications and plan of care   Labs reviewed   10/18/2024   Discussed low fat low cholesterol diet   Discussed exercise and adequate hydration   Will continue to monitor every 4 months     Orders:    pravastatin (PRAVACHOL) 40 mg tablet; Take 1 tablet (40 mg total) by mouth daily

## 2024-12-04 NOTE — ASSESSMENT & PLAN NOTE
Lab Results   Component Value Date    EGFR 76 11/03/2024    EGFR 80 10/18/2024    EGFR 81 05/31/2024    CREATININE 0.80 11/03/2024    CREATININE 0.81 10/18/2024    CREATININE 0.80 05/31/2024   Stable   Under care of Nephrology     Orders:    Xigduo XR  MG TB24; Take 1 tablet by mouth in the morning

## 2024-12-04 NOTE — ASSESSMENT & PLAN NOTE
HTN assessed for stability and discussed plan of care   CrCl cannot be calculated (Patient's most recent lab result is older than the maximum 7 days allowed.).   Reviewed labs   No changes   We will continue to monitor    Orders:    losartan (COZAAR) 25 mg tablet; Take 1 tablet (25 mg total) by mouth daily     If she wants RHC with me, can schedule for next available after that. Otherwise, can offer for available time with Katelyn Villegas or Krystyna.

## 2024-12-04 NOTE — ASSESSMENT & PLAN NOTE
PT discussed   Seen Neurosurgery   Orders:    TiZANidine (ZANAFLEX) 2 MG capsule; Take 1 capsule (2 mg total) by mouth 3 (three) times a day as needed for muscle spasms for up to 10 days    methylPREDNISolone 4 MG tablet therapy pack; Use as directed on package ONLY IF NEEDED FOR ACUTE BACK PAIN

## 2024-12-04 NOTE — PROGRESS NOTES
Name: Stefanie Cameron      : 1955      MRN: [id number]  Encounter Provider: MARSHALL Patricia  Encounter Date: 12/3/2024   [facility]  :  Assessment & Plan  Acute low back pain, unspecified back pain laterality, unspecified whether sciatica present  Back pain much improved   Refill given on medrol pack as she will be traveling on a cruise just in case of flair   Follow up with physical therapy   Orders:    methylPREDNISolone 4 MG tablet therapy pack; Use as directed on package ONLY IF NEEDED FOR ACUTE BACK PAIN    Spinal stenosis of lumbar region without neurogenic claudication  PT discussed   Seen Neurosurgery   Orders:    TiZANidine (ZANAFLEX) 2 MG capsule; Take 1 capsule (2 mg total) by mouth 3 (three) times a day as needed for muscle spasms for up to 10 days    methylPREDNISolone 4 MG tablet therapy pack; Use as directed on package ONLY IF NEEDED FOR ACUTE BACK PAIN    Acute right-sided low back pain with sciatica, sciatica laterality unspecified    Orders:    TiZANidine (ZANAFLEX) 2 MG capsule; Take 1 capsule (2 mg total) by mouth 3 (three) times a day as needed for muscle spasms for up to 10 days    methylPREDNISolone 4 MG tablet therapy pack; Use as directed on package ONLY IF NEEDED FOR ACUTE BACK PAIN    Travel advice encounter  Discussed travel recommendation and as needed meds   Orders:    ondansetron (ZOFRAN-ODT) 4 mg disintegrating tablet; Take 1 tablet (4 mg total) by mouth every 6 (six) hours as needed for nausea or vomiting for up to 5 days    Dyslipidemia  Hyperlipidemia diagnoses assessed and discussed   Reviewed medications and plan of care   Labs reviewed   10/18/2024   Discussed low fat low cholesterol diet   Discussed exercise and adequate hydration   Will continue to monitor every 4 months     Orders:    pravastatin (PRAVACHOL) 40 mg tablet; Take 1 tablet (40 mg total) by mouth daily    Type 2 diabetes mellitus with diabetic  polyneuropathy, without long-term current use of insulin (HCC)  Stable   Continue with ENDO and nephrology   Lab Results   Component Value Date    HGBA1C 6.2 (H) 10/18/2024       Orders:    Xigduo XR  MG TB24; Take 1 tablet by mouth in the morning    CKD (chronic kidney disease) stage 2, GFR 60-89 ml/min  Lab Results   Component Value Date    EGFR 76 11/03/2024    EGFR 80 10/18/2024    EGFR 81 05/31/2024    CREATININE 0.80 11/03/2024    CREATININE 0.81 10/18/2024    CREATININE 0.80 05/31/2024   Stable   Under care of Nephrology     Orders:    Xigduo XR  MG TB24; Take 1 tablet by mouth in the morning    Chronic maxillary sinusitis  Under care of ENT   Orders:    fluticasone (FLONASE) 50 mcg/act nasal spray; 2 sprays into each nostril daily    Nasal congestion    Orders:    fluticasone (FLONASE) 50 mcg/act nasal spray; 2 sprays into each nostril daily    Hypertrophy of inferior nasal turbinate    Orders:    fluticasone (FLONASE) 50 mcg/act nasal spray; 2 sprays into each nostril daily    Tinnitus of both ears  Improved   Under care of ENT  Orders:    fluticasone (FLONASE) 50 mcg/act nasal spray; 2 sprays into each nostril daily    Essential hypertension  HTN assessed for stability and discussed plan of care   CrCl cannot be calculated (Patient's most recent lab result is older than the maximum 7 days allowed.).   Reviewed labs   No changes   We will continue to monitor    Orders:    losartan (COZAAR) 25 mg tablet; Take 1 tablet (25 mg total) by mouth daily          Falls Plan of Care: balance, strength, and gait training instructions were provided. Recommended assistive device to help with gait and balance. Medications that increase falls were reviewed. Vitamin D supplementation was recommended. Home safety education provided. Currently working with physical therapy     Tobacco Cessation Counseling: Tobacco cessation counseling was provided. The patient is sincerely urged to quit consumption of tobacco.  She is not ready to quit tobacco.       History of Present Illness     Pt is a 69 yr old female   Presents in office for follow up on low back pain issues  follow up labs and underlying medical issues   Was seen by neurosurgery   Starting physical therapy and feel better   Thankful that she is feeling better         Review of Systems   Constitutional:  Positive for unexpected weight change (weight loss over 20 lbs however has been stable at 130 lbs). Negative for fatigue.   HENT:  Negative for congestion, postnasal drip and sore throat.    Respiratory:  Negative for cough and shortness of breath.    Cardiovascular:  Negative for chest pain and palpitations.        HTN Stable    Gastrointestinal:  Negative for abdominal distention, abdominal pain, diarrhea, nausea and vomiting.        GERD   Weight loss has stabilized we had discussed follow up with GI    Endocrine:        Stable Type 2 diabetes - stable    Genitourinary:  Negative for difficulty urinating and flank pain.        CKD under care of Nephrology    Musculoskeletal:  Negative for arthralgias, back pain, gait problem, myalgias and neck stiffness.        Back pain much improved    Skin:  Negative for rash.   Neurological:  Negative for headaches.   Hematological:  Negative for adenopathy.   Psychiatric/Behavioral:  Negative for sleep disturbance and suicidal ideas. The patient is not nervous/anxious.      Reviewed  Reviewed   Reviewed       Medical History Reviewed by provider this encounter:  Tobacco  Allergies  Meds  Problems  Med Hx  Surg Hx  Fam Hx     .  Past Medical History   Past Medical History:   Diagnosis Date    Diabetes mellitus (HCC)     Ear problems     Tinnitus     [place] teeth extracted 08/14/2020     Past Surgical History:   Procedure Laterality Date    BREAST BIOPSY       History reviewed. No pertinent family history.   reports that she has been smoking cigarettes. She has never used smokeless tobacco. She reports that she does not  drink alcohol and does not use drugs.  Current Outpatient Medications on File Prior to Visit   Medication Sig Dispense Refill    ascorbic acid (VITAMIN C) 1000 MG tablet Take 1,000 mg by mouth Three times a day      ergocalciferol (VITAMIN D2) 50,000 units Take 50,000 Units by mouth every 14 (fourteen) days      Finerenone (Kerendia) 20 MG TABS Take 20 mg by mouth in the morning      Glucose Blood (BLOOD GLUCOSE TEST STRIPS) STRP Check BG BID      ketoconazole (NIZORAL) 2 % cream APPLY AS DIRECTED DAILY TO RASH      Lancets (OneTouch Delica Plus [id number]) MISC Use 2 (two) times a day      pyridoxine (VITAMIN B6) 50 mg tablet Take 50 mg by mouth daily      Thiamine HCl (B-1 PO) Take by mouth daily      vitamin B-12 (VITAMIN B-12) 1,000 mcg tablet Take 1,000 mcg by mouth daily      hydroquinone 4 % cream APPLY TO BOTH LEGS EVERY DAY (Patient not taking: Reported on 11/22/2024)       No current facility-administered medications on file prior to visit.   No Known Allergies   Current Outpatient Medications on File Prior to Visit   Medication Sig Dispense Refill    ascorbic acid (VITAMIN C) 1000 MG tablet Take 1,000 mg by mouth Three times a day      ergocalciferol (VITAMIN D2) 50,000 units Take 50,000 Units by mouth every 14 (fourteen) days      Finerenone (Kerendia) 20 MG TABS Take 20 mg by mouth in the morning      Glucose Blood (BLOOD GLUCOSE TEST STRIPS) STRP Check BG BID      ketoconazole (NIZORAL) 2 % cream APPLY AS DIRECTED DAILY TO RASH      Lancets (OneTouch Delica Plus [id number]) MISC Use 2 (two) times a day      pyridoxine (VITAMIN B6) 50 mg tablet Take 50 mg by mouth daily      Thiamine HCl (B-1 PO) Take by mouth daily      vitamin B-12 (VITAMIN B-12) 1,000 mcg tablet Take 1,000 mcg by mouth daily      hydroquinone 4 % cream APPLY TO BOTH LEGS EVERY DAY (Patient not taking: Reported on 11/22/2024)       No current facility-administered medications on file prior to visit.      Social History     Tobacco Use  "   Smoking status: Every Day     Current packs/day: 0.25     Types: Cigarettes    Smokeless tobacco: Never   Vaping Use    Vaping status: Never Used   Substance and Sexual Activity    Alcohol use: No    Drug use: No    Sexual activity: Not on file        Objective   /60 (BP Location: Left arm, Patient Position: Sitting, Cuff Size: Adult)   Pulse 80   Temp 99 °F (37.2 °C)   Ht 5' 7\" (1.702 m)   Wt 58.5 kg (129 lb)   SpO2 98%   BMI 20.20 kg/m²      Physical Exam  Vitals and nursing note reviewed.   Constitutional:       Appearance: Normal appearance.      Comments: BMI 20.20    HENT:      Head: Normocephalic and atraumatic.   Eyes:      Extraocular Movements: Extraocular movements intact.   Cardiovascular:      Rate and Rhythm: Normal rate and regular rhythm.      Pulses: Normal pulses.      Heart sounds: Normal heart sounds.   Pulmonary:      Effort: Pulmonary effort is normal.      Breath sounds: Normal breath sounds.   Abdominal:      Palpations: Abdomen is soft.   Musculoskeletal:         General: Tenderness present.      Cervical back: Normal range of motion.      Right lower leg: No edema.      Left lower leg: No edema.      Comments: Low back pain with radiating pain  Difficulty with walking   Unable to stand for too long or sit for too long   Skin:     General: Skin is warm.      Capillary Refill: Capillary refill takes less than 2 seconds.   Neurological:      Mental Status: She is alert and oriented to person, place, and time.   Psychiatric:         Mood and Affect: Mood normal.         Behavior: Behavior normal.     Urine Albumin / Creatinine Ratio  Order: [id number]  Component  Ref Range & Units 12/5/24 10:47 AM   Creatinine, Ur  20 - 275 mg/dL 85   Urine Microalbumin  See Note: mg/dL 2.5   Renal function panel  Order: [id number]  Component  Ref Range & Units 12/5/24 10:47 AM   Glucose  65 - 99 mg/dL 83   Comment:             Fasting reference interval   BUN  7 - 25 mg/dL 11   Creatinine  0.50 - " 1.05 mg/dL 0.83   eGFR CKD-EPI CR 2021  > OR = 60 mL/min/1.73m2 77   BUN/Creatinine Ratio  6 - 22 (calc) SEE NOTE:   Comment:    Not Reported: BUN and Creatinine are within     reference range.         Sodium  135 - 146 mmol/L 141   Potassium  3.5 - 5.3 mmol/L 4.1   Chloride  98 - 110 mmol/L 105   Bicarbonate (CO2)  20 - 32 mmol/L 30   Calcium  8.6 - 10.4 mg/dL 9.6   Phosphorus  2.1 - 4.3 mg/dL 4.2   Albumin  3.6 - 5.1 g/dL 4.5   Narrative    FASTING:YES    FASTING: YES    Specimen Collected: 12/05/24 10:47 AM    Performed by: [facility] Last Resulted: 12/06/24  3:37 AM    Contains abnormal data CBC and differential  Order: [id number]  Component  Ref Range & Units 12/5/24 10:47 AM   WBC  3.8 - 10.8 Thousand/uL 4   RBC  3.80 - 5.10 Million/uL 5.36 High    Hemoglobin  11.7 - 15.5 g/dL 15.8 High    Hematocrit  35.0 - 45.0 % 47.4 High    MCV  80.0 - 100.0 fL 88.4   MCH  27.0 - 33.0 pg 29.5   MCHC  32.0 - 36.0 g/dL 33.3   Comment: For adults, a slight decrease in the calculated MCHC  value (in the range of 30 to 32 g/dL) is most likely  not clinically significant; however, it should be  interpreted with caution in correlation with other  red cell parameters and the patient's clinical  condition.   RDW  11.0 - 15.0 % 13.5   Platelets  140 - 400 Thousand/uL 320   MPV  7.5 - 12.5 fL 9.8   Neutrophils Absolute  1500 - 7800 cells/uL 1,720    Contains abnormal data HEMOGLOBIN A1C  Order: [id number]   Status: Final result       Next appt: 12/16/2024 at 12:00 PM in Physical Therapy (Luis Novak PT)              Component  Ref Range & Units (holisa) 10/18/24 11:20 AM 5/31/24  9:35 AM 11/27/23  8:27 AM 4/25/23  7:45 AM 1/17/23 10:15 AM 9/15/22 11:20 AM 4/20/22  8:13 AM   Hgb A1c 6.2 High  6.2 High  CM 6.0 High  CM 5.9 High  CM 6.1 High  CM 6.7 High  CM 6.6 High  CM        Administrative Statements   I have spent a total time of 45  minutes in caring for this patient on the day of the visit/encounter including Diagnostic results,  Prognosis, Risks and benefits of tx options, Instructions for management, Patient and family education, Importance of tx compliance, Risk factor reductions, Impressions, Counseling / Coordination of care, Documenting in the medical record, Reviewing / ordering tests, medicine, procedures  , Obtaining or reviewing history  , and Communicating with other healthcare professionals . Topics discussed with the patient / family include symptom assessment and management, medication review, medication adjustment, and goals of care.

## 2024-12-04 NOTE — ASSESSMENT & PLAN NOTE
Stable   Continue with ENDO and nephrology   Lab Results   Component Value Date    HGBA1C 6.2 (H) 10/18/2024       Orders:    Xigduo XR  MG TB24; Take 1 tablet by mouth in the morning

## 2024-12-05 ENCOUNTER — EVALUATION (OUTPATIENT)
Dept: PHYSICAL THERAPY | Facility: CLINIC | Age: 69
End: 2024-12-05
Payer: MEDICARE

## 2024-12-05 DIAGNOSIS — M48.061 SPINAL STENOSIS OF LUMBAR REGION WITHOUT NEUROGENIC CLAUDICATION: Primary | ICD-10-CM

## 2024-12-05 PROCEDURE — 97162 PT EVAL MOD COMPLEX 30 MIN: CPT | Performed by: PHYSICAL THERAPIST

## 2024-12-05 PROCEDURE — 97110 THERAPEUTIC EXERCISES: CPT | Performed by: PHYSICAL THERAPIST

## 2024-12-05 NOTE — PROGRESS NOTES
PT Evaluation     Today's date: 2024  Patient name: Stefanie Cameron  : 1955  MRN: [id number]  Referring provider: Andreina Espinoza CRNP  Dx:   Encounter Diagnosis     ICD-10-CM    1. Spinal stenosis of lumbar region without neurogenic claudication  M48.061 Ambulatory Referral to Physical Therapy          Start Time: 1445  Stop Time: 1600  Total time in clinic (min): 75 minutes    Assessment  Impairments: abnormal gait, abnormal muscle firing, abnormal or restricted ROM, abnormal movement, activity intolerance, impaired physical strength, lacks appropriate home exercise program, pain with function, weight-bearing intolerance, poor posture  and poor body mechanics    Assessment details: Stefanie Cameron is a 69 y.o. female who presents with acute onset of bilateral low back pain with severe tightness. Patient has since been prescribed Methylprednisolone and Tizanidine, as well as pursued acupuncture. Patient is reporting improvement of symptoms, but continues to have pain with sustained activities, including mopping/sweeping, standing and walking. Examination findings demonstrated limited, painful lumbar ROM and tightness/pain to lumbar paraspinals. Patient's clinical presentation is consistent with their referring diagnosis of acute lumbar sprain and muscle spasm. Patient does have underlying lumbar stenosis, however, physical presentation does not appear consistent with diagnostic imaging d/t pain with flexion based activities. Patient would benefit from skilled physical therapy to address their aforementioned impairments, improve their level of function and to improve their overall quality of life.    Understanding of Dx/Px/POC: excellent     Prognosis: excellent    Goals  Short Term Goals: to be achieved by 4 weeks  1) Patient to be independent with basic HEP.  2) Decrease pain to 5/10 at its worst.  3) Increase lumbar spine ROM by 25% in all deficient planes.   4) Increase LE strength by 1/2 MMT  grade in all deficient planes.  5) Patient to report decreased sleep interruption secondary to pain.  6) Increase seated and ambulatory tolerance by 10 min.    Long Term Goals: to be achieved by discharge  1) FOTO equal to or greater than 61.  2) Patient to be independent with comprehensive HEP.  3) Abolish pain for improved quality of life.  4) Lumbar spine ROM WNL all planes to improve a/iadls.  5) Increase LE strength to 5/5 MMT grade in all planes to improve a/iadls.  6) Patient to report no sleep interruption secondary to pain.  7) Increase seated and ambulatory tolerance to 60 min.      Plan  Patient would benefit from: skilled PT  Planned modality interventions: biofeedback, cryotherapy, TENS, thermotherapy: hydrocollator packs, unattended electrical stimulation and low level laser therapy    Planned therapy interventions: abdominal trunk stabilization, activity modification, ADL retraining, ADL training, behavior modification, body mechanics training, breathing training, functional ROM exercises, home exercise program, IADL retraining, joint mobilization, manual therapy, massage, motor coordination training, neuromuscular re-education, patient education, postural training, self care, strengthening, stretching, therapeutic activities, therapeutic exercise and transfer training    Frequency: 2-3x week.  Duration in weeks: 12  Plan of Care beginning date: 12/5/2024  Plan of Care expiration date: 2/27/2025  Treatment plan discussed with: patient        Subjective Evaluation    History of Present Illness  Mechanism of injury: Patient reports that in the beginning of November she felt her entire body lock up and freeze from her neck to her waist. Patient went to the ED where she had her blood and urine tested, results negative. Patient had a CT of abdomen pelvis taken (see results below). Patient was administered IV Tramadol, which helped to improve her mobility and lessen her pain. Patient followed up with her  "PCP, who prescribed her Methylprednisolone and Tizanidine, which helped to significantly improve her symptoms. Patient continues to have pain when bending/reaching, mopping/sweeping/cleaning, sitting, and walking. Patient feels better using ice/heat and medication. Patient feels better sitting when she shifts her weight to the right or left. Patient has had one episode of bowel incontinence. Patient occasionally gets tingling in her feet, but does have a h/o tingling and polyneuropathy. Patient denies experiencing progressive weakness. Patient has received acupuncture since the onset of her pain, which helped to relieve her pain. Patient has had h/o sciatica in the past year and was on Gabapentin, but has since weaned off.     CT abdomen pelvis: \"Advanced multilevel lumbar spondylosis, with severe disc space narrowing and disc degeneration at L5-S1. There is a grade 1 anterolisthesis of L4 on L5, due to advanced multilevel lower lumbar facet arthropathy.\"  Patient Goals  Patient goal: return to premorbid norm  Pain  Current pain ratin  At best pain ratin  At worst pain rating: 10  Location: right > left lower back  Quality: tight and dull ache    Social Support    Employment status: not working  Treatments  Previous treatment: medication      Objective     Postural Observations  Seated posture: good  Standing posture: good      Palpation   Left   Hypertonic in the erector spinae and lumbar paraspinals.   Tenderness of the erector spinae and lumbar paraspinals.     Right   Hypertonic in the erector spinae and lumbar paraspinals.   Tenderness of the erector spinae and lumbar paraspinals.     Tenderness     Lumbar Spine  No tenderness in the spinous process.     Active Range of Motion     Lumbar   Flexion:  WFL and with pain  Extension:  with pain Restriction level: maximal  Left rotation:  Restriction level: moderate  Right rotation:  Restriction level: moderate    Joint Play     Hypomobile: L1, L2, L3, L4, L5 " and S1   Mechanical Assessment    Cervical      Thoracic      Lumbar    Standing flexion: repeated movements   Pain location:peripheralized  Pain intensity: worse  Standing extension: repeated movements  Pain location: no change  Lying extension: repeated movements  Pain location: no change    Strength/Myotome Testing     Lumbar   Left   Normal strength  Heel walk: normal  Toe walk: normal    Right   Normal strength  Heel walk: normal  Toe walk: normal    Tests     Lumbar     Left   Negative crossed SLR and passive SLR.     Right   Negative crossed SLR and passive SLR.     Left Hip   Negative FLO and FADIR.     Right Hip   Negative FLO and FADIR.     Ambulation     Ambulation: Level Surfaces   Ambulation without assistive device: independent    Observational Gait   Gait: within functional limits     Functional Assessment      Squat    Left within functional limits and right within functional limits.                POC expires Unit limit Auth  expiration date PT/OT + Visit Limit?   2/27/25 N/A N/A BOMN                 Visit/Unit Tracking  AUTH Status:  Date 12/5              N/A Used 1               Remaining                    Precautions: DM, polyneuropathy      Manuals 12/5            Gr. II-IV lumbar CPA mobs             Piriformis str. - bn/l             Theragun - b/l lumbar paraspinals                          Neuro Re-Ed             Standing multifidus press             Seated multifidus rotation with TB             Quadruped multifidus lift             Seated MB multifidus lift             Webslide: L row                                       Ther Ex             Patient education: pathophysiology, differential diagnosis, HEP review GR            Nustep: UE/LE             Piriformis str.             JADYN Reviewed            Bridging             Clamshells             LTR                          Ther Activity                                       Gait Training                                        Modalities

## 2024-12-09 ENCOUNTER — OFFICE VISIT (OUTPATIENT)
Dept: PHYSICAL THERAPY | Facility: CLINIC | Age: 69
End: 2024-12-09
Payer: MEDICARE

## 2024-12-09 DIAGNOSIS — M48.061 SPINAL STENOSIS OF LUMBAR REGION WITHOUT NEUROGENIC CLAUDICATION: Primary | ICD-10-CM

## 2024-12-09 PROCEDURE — 97140 MANUAL THERAPY 1/> REGIONS: CPT | Performed by: PHYSICAL THERAPIST

## 2024-12-09 PROCEDURE — 97014 ELECTRIC STIMULATION THERAPY: CPT | Performed by: PHYSICAL THERAPIST

## 2024-12-09 PROCEDURE — 97110 THERAPEUTIC EXERCISES: CPT | Performed by: PHYSICAL THERAPIST

## 2024-12-09 NOTE — PROGRESS NOTES
"Daily Note     Today's date: 2024  Patient name: Stefanie Cameron  : 1955  MRN: [id number]  Referring provider: Andreina Espinoza CRNP  Dx:   Encounter Diagnosis     ICD-10-CM    1. Spinal stenosis of lumbar region without neurogenic claudication  M48.061           Start Time: 1613  Stop Time: 1710  Total time in clinic (min): 57 minutes    Subjective: Patient reports that her arms and lower back hurt more after performing her HEP.      Objective: See treatment diary below      Assessment: Tolerated treatment well. Patient demonstrated fatigue post treatment and would benefit from continued PT. Patient reported decreased right gluteal pain at end of session.      Plan: Continue per plan of care.  Progress treatment as tolerated.         POC expires Unit limit Auth  expiration date PT/OT + Visit Limit?   25 N/A N/A BOMN                 Visit/Unit Tracking  AUTH Status:  Date              N/A Used 1 2              Remaining                    Precautions: DM, polyneuropathy      Manuals            Gr. II-IV lumbar CPA, UPA mobs  GR           Piriformis str. - b/l             Theragun - b/l lumbar paraspinals  GR                        Neuro Re-Ed             Standing multifidus press             Seated multifidus rotation with TB             Quadruped multifidus lift             Seated MB multifidus lift             Webslide: L row                                       Ther Ex             Patient education: pathophysiology, differential diagnosis, HEP review GR            Nustep: UE/LE  L1 7 min           Piriformis str.  3x30\" b/l           JADYN Reviewed            Bridging             Clamshells             LTR                          Ther Activity                                       Gait Training                                       Modalities             MH + TENS  Prone x15 ea.                             "

## 2024-12-12 ENCOUNTER — OFFICE VISIT (OUTPATIENT)
Dept: PHYSICAL THERAPY | Facility: CLINIC | Age: 69
End: 2024-12-12
Payer: MEDICARE

## 2024-12-12 DIAGNOSIS — M48.061 SPINAL STENOSIS OF LUMBAR REGION WITHOUT NEUROGENIC CLAUDICATION: Primary | ICD-10-CM

## 2024-12-12 PROCEDURE — 97110 THERAPEUTIC EXERCISES: CPT | Performed by: PHYSICAL THERAPIST

## 2024-12-12 NOTE — PROGRESS NOTES
"Daily Note     Today's date: 2024  Patient name: Stefanie Cameron  : 1955  MRN: [id number]  Referring provider: Andreina Espinoza CRNP  Dx:   Encounter Diagnosis     ICD-10-CM    1. Spinal stenosis of lumbar region without neurogenic claudication  M48.061           Start Time: 1015  Stop Time: 1110  Total time in clinic (min): 55 minutes    Subjective: Patient reports that she went to the acupuncturist and felt relief of tension following that treatment session.      Objective: See treatment diary below      Assessment: Tolerated treatment fair. Patient demonstrated fatigue post treatment and would benefit from continued PT. Patient with increased right hip adductor spasm and tightness during LTR.      Plan: Continue per plan of care.  Progress treatment as tolerated.         POC expires Unit limit Auth  expiration date PT/OT + Visit Limit?   25 N/A N/A BOMN                 Visit/Unit Tracking  AUTH Status:  Date             N/A Used 1 2 3             Remaining                    Precautions: DM, polyneuropathy      Manuals           Gr. II-IV lumbar CPA, UPA mobs  GR           Piriformis str. - b/l             Theragun - b/l lumbar paraspinals  GR                        Neuro Re-Ed             Standing multifidus press             Seated multifidus rotation with TB             Quadruped multifidus lift             Seated MB multifidus lift             Webslide: L row                                       Ther Ex             Patient education: pathophysiology, differential diagnosis, HEP review GR            Nustep: UE/LE  L1 7 min L3 10 min          Piriformis str.  3x30\" b/l           JADYN Reviewed            Bridging   2x10 3\"          Clamshells             LTR   10x10\" b/l          Supine hip flexor str.   3x30\"          Butterfly str.   3x30\"          Ther Activity                                       Gait Training                                     "   Modalities             MH + TENS  Prone x15 ea.

## 2024-12-16 ENCOUNTER — APPOINTMENT (OUTPATIENT)
Dept: PHYSICAL THERAPY | Facility: CLINIC | Age: 69
End: 2024-12-16
Payer: MEDICARE

## 2024-12-16 DIAGNOSIS — M48.061 SPINAL STENOSIS OF LUMBAR REGION WITHOUT NEUROGENIC CLAUDICATION: Primary | ICD-10-CM

## 2024-12-16 NOTE — PROGRESS NOTES
"Daily Note     Today's date: 2024  Patient name: Stefanie Cameron  : 1955  MRN: [id number]  Referring provider: Andreina Espinoza CRNP  Dx:   Encounter Diagnosis     ICD-10-CM    1. Spinal stenosis of lumbar region without neurogenic claudication  M48.061                      Subjective: ***      Objective: See treatment diary below      Assessment: Tolerated treatment well. Patient demonstrated fatigue post treatment and would benefit from continued PT.       Plan: Continue per plan of care.  Progress treatment as tolerated.         POC expires Unit limit Auth  expiration date PT/OT + Visit Limit?   25 N/A N/A BOMN                 Visit/Unit Tracking  AUTH Status:  Date            N/A Used 1 2 3 4            Remaining                    Precautions: DM, polyneuropathy      Manuals          Gr. II-IV lumbar CPA, UPA mobs  GR           Piriformis str. - b/l             Theragun - b/l lumbar paraspinals  GR                        Neuro Re-Ed             Standing multifidus press             Seated multifidus rotation with TB             Quadruped multifidus lift             Seated MB multifidus lift             Webslide: L row                                       Ther Ex             Patient education: pathophysiology, differential diagnosis, HEP review GR            Nustep: UE/LE  L1 7 min L3 10 min          Piriformis str.  3x30\" b/l           JADYN Reviewed            Bridging   2x10 3\"          Clamshells             LTR   10x10\" b/l          Supine hip flexor str.   3x30\"          Butterfly str.   3x30\"          Ther Activity                                       Gait Training                                       Modalities             MH + TENS  Prone x15 ea.                                 "

## 2024-12-18 ENCOUNTER — OFFICE VISIT (OUTPATIENT)
Dept: PHYSICAL THERAPY | Facility: CLINIC | Age: 69
End: 2024-12-18
Payer: MEDICARE

## 2024-12-18 DIAGNOSIS — M48.061 SPINAL STENOSIS OF LUMBAR REGION WITHOUT NEUROGENIC CLAUDICATION: Primary | ICD-10-CM

## 2024-12-18 PROCEDURE — 97014 ELECTRIC STIMULATION THERAPY: CPT | Performed by: PHYSICAL THERAPIST

## 2024-12-18 PROCEDURE — 97112 NEUROMUSCULAR REEDUCATION: CPT | Performed by: PHYSICAL THERAPIST

## 2024-12-18 PROCEDURE — 97110 THERAPEUTIC EXERCISES: CPT | Performed by: PHYSICAL THERAPIST

## 2024-12-18 NOTE — PROGRESS NOTES
"Daily Note     Today's date: 2024  Patient name: Stefanie Cameron  : 1955  MRN: [id number]  Referring provider: Andreina Espinoza CRNP  Dx:   Encounter Diagnosis     ICD-10-CM    1. Spinal stenosis of lumbar region without neurogenic claudication  M48.061           Start Time: 1150  Stop Time: 1307  Total time in clinic (min): 77 minutes    Subjective: Patient reports that she was sore following her previous work out.      Objective: See treatment diary below      Assessment: Tolerated treatment well. Patient demonstrated fatigue post treatment and would benefit from continued PT. Patient reported increased UT tightness at end of session. No increase in lower back pain.      Plan: Continue per plan of care.  Progress treatment as tolerated.         POC expires Unit limit Auth  expiration date PT/OT + Visit Limit?   25 N/A N/A BOMN                 Visit/Unit Tracking  AUTH Status:  Date           N/A Used 1 2 3 4 5           Remaining                    Precautions: DM, polyneuropathy      Manuals          Gr. II-IV lumbar CPA, UPA mobs  GR           Piriformis str. - b/l             Theragun - b/l lumbar paraspinals  GR                        Neuro Re-Ed             Standing multifidus press             Seated multifidus rotation with TB             Quadruped multifidus lift             Seated MB multifidus lift             Webslide: L row                                       Ther Ex             Patient education: pathophysiology, differential diagnosis, HEP review GR            Nustep: UE/LE  L1 7 min L3 10 min L4 10 min         Piriformis str.  3x30\" b/l           JADYN Reviewed            Bridging   2x10 3\"          Clamshells             LTR   10x10\" b/l          Supine hip flexor str.   3x30\"          Butterfly str.   3x30\"          Ther Activity                                       Gait Training                                       Modalities  "            MH + TENS  Prone x15 ea.

## 2024-12-27 ENCOUNTER — OFFICE VISIT (OUTPATIENT)
Dept: PHYSICAL THERAPY | Facility: CLINIC | Age: 69
End: 2024-12-27
Payer: MEDICARE

## 2024-12-27 DIAGNOSIS — M48.061 SPINAL STENOSIS OF LUMBAR REGION WITHOUT NEUROGENIC CLAUDICATION: Primary | ICD-10-CM

## 2024-12-27 PROCEDURE — 97140 MANUAL THERAPY 1/> REGIONS: CPT | Performed by: PHYSICAL THERAPIST

## 2024-12-27 PROCEDURE — 97010 HOT OR COLD PACKS THERAPY: CPT | Performed by: PHYSICAL THERAPIST

## 2024-12-27 PROCEDURE — 97110 THERAPEUTIC EXERCISES: CPT | Performed by: PHYSICAL THERAPIST

## 2024-12-27 NOTE — PROGRESS NOTES
"Daily Note     Today's date: 2024  Patient name: Stefanie Cameron  : 1955  MRN: [id number]  Referring provider: Andreina Espinoza CRNP  Dx:   Encounter Diagnosis     ICD-10-CM    1. Spinal stenosis of lumbar region without neurogenic claudication  M48.061           Start Time: 0945  Stop Time: 1100  Total time in clinic (min): 75 minutes    Subjective: Patient reports that she was having pain don/doffing her shoes and bending forward to  her mail.      Objective: See treatment diary below      Assessment: Tolerated treatment well. Patient demonstrated fatigue post treatment and would benefit from continued PT. Patient reported decreased pain squatting and bending following manual techniques at end of session.      Plan: Continue per plan of care.  Progress treatment as tolerated.         POC expires Unit limit Auth  expiration date PT/OT + Visit Limit?   25 N/A N/A BOMN                 Visit/Unit Tracking  AUTH Status:  Date          N/A Used 1 2 3 4 5 6          Remaining                    Precautions: DM, polyneuropathy      Manuals         Gr. II-IV lumbar CPA, UPA mobs  GR           Piriformis str. - b/l             Theragun - b/l lumbar paraspinals  GR           Lumbar gapping in side lying     GR        Lumbar rotation mobs in side lying     GR        IASTM b/l lumbar paraspinals     GR                     Neuro Re-Ed             Standing multifidus press             Seated multifidus rotation with TB             Quadruped multifidus lift             Seated MB multifidus lift             Webslide: L row                                       Ther Ex             Patient education: pathophysiology, differential diagnosis, HEP review GR            Nustep: UE/LE  L1 7 min L3 10 min L4 10 min L4 10 min        Piriformis str.  3x30\" b/l   Reviewed        JADYN Reviewed            Bridging   2x10 3\"          Clamshells             LTR " "  10x10\" b/l          Supine hip flexor str.   3x30\"          Butterfly str.   3x30\"          Prafinn str.     Reviewed        Ther Activity                                       Gait Training                                       Modalities             MH + TENS  Prone x15 ea.   Prone x 12 min                                  "

## 2025-02-19 ENCOUNTER — OFFICE VISIT (OUTPATIENT)
Dept: FAMILY MEDICINE CLINIC | Facility: CLINIC | Age: 70
End: 2025-02-19
Payer: MEDICARE

## 2025-02-19 VITALS
BODY MASS INDEX: 20.09 KG/M2 | OXYGEN SATURATION: 97 % | WEIGHT: 128 LBS | HEIGHT: 67 IN | TEMPERATURE: 99 F | SYSTOLIC BLOOD PRESSURE: 104 MMHG | HEART RATE: 72 BPM | DIASTOLIC BLOOD PRESSURE: 60 MMHG

## 2025-02-19 DIAGNOSIS — M48.061 SPINAL STENOSIS OF LUMBAR REGION WITHOUT NEUROGENIC CLAUDICATION: ICD-10-CM

## 2025-02-19 DIAGNOSIS — R05.8 OTHER COUGH: ICD-10-CM

## 2025-02-19 DIAGNOSIS — N18.2 CKD (CHRONIC KIDNEY DISEASE) STAGE 2, GFR 60-89 ML/MIN: ICD-10-CM

## 2025-02-19 DIAGNOSIS — M54.40 ACUTE RIGHT-SIDED LOW BACK PAIN WITH SCIATICA, SCIATICA LATERALITY UNSPECIFIED: ICD-10-CM

## 2025-02-19 DIAGNOSIS — E78.00 PURE HYPERCHOLESTEROLEMIA: ICD-10-CM

## 2025-02-19 DIAGNOSIS — M54.50 ACUTE LOW BACK PAIN, UNSPECIFIED BACK PAIN LATERALITY, UNSPECIFIED WHETHER SCIATICA PRESENT: ICD-10-CM

## 2025-02-19 DIAGNOSIS — N39.46 MIXED INCONTINENCE: ICD-10-CM

## 2025-02-19 DIAGNOSIS — E11.42 TYPE 2 DIABETES MELLITUS WITH DIABETIC POLYNEUROPATHY, WITHOUT LONG-TERM CURRENT USE OF INSULIN (HCC): ICD-10-CM

## 2025-02-19 DIAGNOSIS — N32.81 OAB (OVERACTIVE BLADDER): ICD-10-CM

## 2025-02-19 DIAGNOSIS — I10 ESSENTIAL HYPERTENSION: Primary | ICD-10-CM

## 2025-02-19 PROCEDURE — G2211 COMPLEX E/M VISIT ADD ON: HCPCS | Performed by: NURSE PRACTITIONER

## 2025-02-19 PROCEDURE — 99214 OFFICE O/P EST MOD 30 MIN: CPT | Performed by: NURSE PRACTITIONER

## 2025-02-19 RX ORDER — AMOXICILLIN 875 MG/1
875 TABLET, COATED ORAL 2 TIMES DAILY
Qty: 10 TABLET | Refills: 0 | Status: SHIPPED | OUTPATIENT
Start: 2025-02-19 | End: 2025-02-24

## 2025-02-19 RX ORDER — METHYLPREDNISOLONE 4 MG/1
TABLET ORAL
Qty: 21 EACH | Refills: 0 | Status: SHIPPED | OUTPATIENT
Start: 2025-02-19

## 2025-02-19 RX ORDER — TIZANIDINE HYDROCHLORIDE 2 MG/1
2 CAPSULE, GELATIN COATED ORAL 3 TIMES DAILY PRN
Qty: 30 CAPSULE | Refills: 0 | Status: SHIPPED | OUTPATIENT
Start: 2025-02-19 | End: 2025-03-01

## 2025-02-19 NOTE — ASSESSMENT & PLAN NOTE
Lab Results   Component Value Date    EGFR 76 11/03/2024    EGFR 80 10/18/2024    EGFR 81 05/31/2024    CREATININE 0.80 11/03/2024    CREATININE 0.81 10/18/2024    CREATININE 0.80 05/31/2024     Under care of Nephrology stable     Orders:    Comprehensive metabolic panel    CBC and differential    TSH, 3rd generation with Free T4 reflex    Lipid panel    Hemoglobin A1C    UA/M w/rflx Culture, Routine

## 2025-02-19 NOTE — ASSESSMENT & PLAN NOTE
Orders:    Comprehensive metabolic panel    CBC and differential    TSH, 3rd generation with Free T4 reflex    Lipid panel    Hemoglobin A1C    UA/M w/rflx Culture, Routine

## 2025-02-19 NOTE — ASSESSMENT & PLAN NOTE
Stable   HTN assessed for stability and discussed plan of care   CrCl cannot be calculated (Patient's most recent lab result is older than the maximum 7 days allowed.).   Reviewed labs   No changes   We will continue to monitor    Orders:    Comprehensive metabolic panel    CBC and differential    TSH, 3rd generation with Free T4 reflex    Lipid panel    Hemoglobin A1C    UA/M w/rflx Culture, Routine

## 2025-02-19 NOTE — ASSESSMENT & PLAN NOTE
Hyperlipidemia diagnoses assessed and discussed   Reviewed medications and plan of care   Labs reviewed   10/18/2024   Discussed low fat low cholesterol diet   Discussed exercise and adequate hydration   Will continue to monitor every 4 months     Orders:    Comprehensive metabolic panel    CBC and differential    TSH, 3rd generation with Free T4 reflex    Lipid panel    Hemoglobin A1C    UA/M w/rflx Culture, Routine

## 2025-02-19 NOTE — ASSESSMENT & PLAN NOTE
Stable   Diabetes assessed for stability and discussed plan of care   Reviewed medications and changes as needed   Reviewed labs and medications   Diet and adequate hydration reviewed   We will continue 4 month follow up surveillance  Reviewed Podiatry follow up   Reviewed  DM eye exam    Lab Results   Component Value Date    HGBA1C 6.2 (H) 10/18/2024       Orders:    Comprehensive metabolic panel    CBC and differential    TSH, 3rd generation with Free T4 reflex    Lipid panel    Hemoglobin A1C    UA/M w/rflx Culture, Routine    Albumin / creatinine urine ratio

## 2025-02-19 NOTE — PROGRESS NOTES
Name: Stefanie Cameron      : 1955      MRN: [id number]  Encounter Provider: MARSHALL Patricia  Encounter Date: 2025   [facility]  :  Assessment & Plan  Essential hypertension  Stable   HTN assessed for stability and discussed plan of care   CrCl cannot be calculated (Patient's most recent lab result is older than the maximum 7 days allowed.).   Reviewed labs   No changes   We will continue to monitor    Orders:    Comprehensive metabolic panel    CBC and differential    TSH, 3rd generation with Free T4 reflex    Lipid panel    Hemoglobin A1C    UA/M w/rflx Culture, Routine    Type 2 diabetes mellitus with diabetic polyneuropathy, without long-term current use of insulin (HCC)  Stable   Diabetes assessed for stability and discussed plan of care   Reviewed medications and changes as needed   Reviewed labs and medications   Diet and adequate hydration reviewed   We will continue 4 month follow up surveillance  Reviewed Podiatry follow up   Reviewed  DM eye exam    Lab Results   Component Value Date    HGBA1C 6.2 (H) 10/18/2024       Orders:    Comprehensive metabolic panel    CBC and differential    TSH, 3rd generation with Free T4 reflex    Lipid panel    Hemoglobin A1C    UA/M w/rflx Culture, Routine    Albumin / creatinine urine ratio    CKD (chronic kidney disease) stage 2, GFR 60-89 ml/min  Lab Results   Component Value Date    EGFR 76 2024    EGFR 80 10/18/2024    EGFR 81 2024    CREATININE 0.80 2024    CREATININE 0.81 10/18/2024    CREATININE 0.80 2024     Under care of Nephrology stable     Orders:    Comprehensive metabolic panel    CBC and differential    TSH, 3rd generation with Free T4 reflex    Lipid panel    Hemoglobin A1C    UA/M w/rflx Culture, Routine    OAB (overactive bladder)  Stable   Pelvic exercises        Pure hypercholesterolemia  Hyperlipidemia diagnoses assessed and discussed   Reviewed medications and  plan of care   Labs reviewed   10/18/2024   Discussed low fat low cholesterol diet   Discussed exercise and adequate hydration   Will continue to monitor every 4 months     Orders:    Comprehensive metabolic panel    CBC and differential    TSH, 3rd generation with Free T4 reflex    Lipid panel    Hemoglobin A1C    UA/M w/rflx Culture, Routine    Mixed incontinence    Orders:    Comprehensive metabolic panel    CBC and differential    TSH, 3rd generation with Free T4 reflex    Lipid panel    Hemoglobin A1C    UA/M w/rflx Culture, Routine    Spinal stenosis of lumbar region without neurogenic claudication  These medications are on stand by for travel     Orders:    methylPREDNISolone 4 MG tablet therapy pack; Use as directed on package ONLY IF NEEDED FOR ACUTE BACK PAIN    TiZANidine (ZANAFLEX) 2 MG capsule; Take 1 capsule (2 mg total) by mouth 3 (three) times a day as needed for muscle spasms for up to 10 days    Acute low back pain, unspecified back pain laterality, unspecified whether sciatica present    Orders:    methylPREDNISolone 4 MG tablet therapy pack; Use as directed on package ONLY IF NEEDED FOR ACUTE BACK PAIN    amoxicillin (AMOXIL) 875 mg tablet; Take 1 tablet (875 mg total) by mouth 2 (two) times a day for 5 days    Acute right-sided low back pain with sciatica, sciatica laterality unspecified    Orders:    methylPREDNISolone 4 MG tablet therapy pack; Use as directed on package ONLY IF NEEDED FOR ACUTE BACK PAIN    TiZANidine (ZANAFLEX) 2 MG capsule; Take 1 capsule (2 mg total) by mouth 3 (three) times a day as needed for muscle spasms for up to 10 days    amoxicillin (AMOXIL) 875 mg tablet; Take 1 tablet (875 mg total) by mouth 2 (two) times a day for 5 days    Other cough  Discussed symptoms management Tylenol and Motrin as needed for headaches or fevers   Needed nasal saline lavages for nasal congestion  Discussed over-the-counter modalities  Discussed watchful waiting and if not better within 3 to  4 days they are supposed to call me   If any excessive shortness of breath difficulty breathing please go to the ER   Orders:    amoxicillin (AMOXIL) 875 mg tablet; Take 1 tablet (875 mg total) by mouth 2 (two) times a day for 5 days          Depression Screening and Follow-up Plan: Patient was screened for depression during today's encounter. They screened negative with a PHQ-2 score of 0.      Falls Plan of Care: balance, strength, and gait training instructions were provided. Recommended assistive device to help with gait and balance. Medications that increase falls were reviewed. Assessed feet and footwear. Vitamin D supplementation was recommended. Home safety education provided.     Urinary Incontinence Plan of Care: counseling topics discussed: practice Kegel (pelvic floor strengthening) exercises, use restroom every 2 hours, limit alcohol, caffeine, spicy foods, and acidic foods, keeping a bladder diary, limiting fluid intake 3-4 hours before bed, weight loss, preventing constipation, taking fluid pills at a time when you can get to bathroom easily, improving blood sugar control, limiting fluid intake to 60 oz. per day, wearing CARLOS stockings for edema control and bladder retraining.       History of Present Illness   Pt is a 69 yr old female   Presents in office for follow up on underlying medical issues and lab review   She feels well   She was away on a cruise = under care of Nephrology for CKD - she is stable   Diabetes has been stable   HTN stable   Currently has a cough and some congestion after her trip now for dew weeks       Review of Systems   Constitutional:  Positive for unexpected weight change (weight loss over 20 lbs however has been stable at 130 lbs). Negative for fatigue.   HENT:  Positive for congestion and postnasal drip. Negative for sore throat.    Respiratory:  Positive for cough. Negative for shortness of breath.    Cardiovascular:  Negative for chest pain and palpitations.        HTN  "Stable    Gastrointestinal:  Negative for abdominal distention, abdominal pain, diarrhea, nausea and vomiting.        GERD   Weight loss has stabilized we had discussed follow up with GI    Endocrine:        Stable Type 2 diabetes - stable    Genitourinary:  Negative for difficulty urinating and flank pain.        CKD under care of Nephrology    Musculoskeletal:  Negative for arthralgias, back pain, gait problem, myalgias and neck stiffness.        Back pain much improved    Skin:  Negative for rash.   Neurological:  Negative for headaches.   Hematological:  Negative for adenopathy.   Psychiatric/Behavioral:  Negative for sleep disturbance and suicidal ideas. The patient is not nervous/anxious.        Objective   /60 (BP Location: Left arm, Patient Position: Sitting, Cuff Size: Adult)   Pulse 72   Temp 99 °F (37.2 °C)   Ht 5' 7\" (1.702 m)   Wt 58.1 kg (128 lb)   SpO2 97%   BMI 20.05 kg/m²      Physical Exam  Vitals and nursing note reviewed.   Constitutional:       Appearance: Normal appearance.      Comments: BMI 20.05    HENT:      Head: Atraumatic.   Eyes:      Extraocular Movements: Extraocular movements intact.   Cardiovascular:      Rate and Rhythm: Normal rate and regular rhythm.      Pulses: Normal pulses.      Heart sounds: Normal heart sounds.   Pulmonary:      Breath sounds: Wheezing and rhonchi present.   Abdominal:      Palpations: Abdomen is soft.   Musculoskeletal:      Cervical back: Normal range of motion.      Right lower leg: No edema.      Left lower leg: No edema.   Skin:     General: Skin is warm.   Neurological:      Mental Status: She is alert and oriented to person, place, and time.   Psychiatric:         Mood and Affect: Mood normal.         Behavior: Behavior normal.       CBC and differential  Order: [id number]  Component  Ref Range & Units 12/5/24 10:47 AM   WBC  3.8 - 10.8 Thousand/uL 4   RBC  3.80 - 5.10 Million/uL 5.36 High    Hemoglobin  11.7 - 15.5 g/dL 15.8 High  "   Hematocrit  35.0 - 45.0 % 47.4 High    MCV  80.0 - 100.0 fL 88.4   MCH  27.0 - 33.0 pg 29.5   MCHC  32.0 - 36.0 g/dL 33.3   Comment: For adults, a slight decrease in the calculated MCHC  value (in the range of 30 to 32 g/dL) is most likely  not clinically significant; however, it should be  interpreted with caution in correlation with other  red cell parameters and the patient's clinical  condition.   RDW  11.0 - 15.0 % 13.5   Platelets  140 - 400 Thousand/uL 320   MPV  7.5 - 12.5 fL 9.8   Neutrophils Absolute  1500 - 7800 cells/uL 1,720   Band Neutrophils Absolute, Manual Count  0 - 750 cells/uL CANCELED   Comment: Result canceled by the ancillary.   Metamyelocytes Absolute  0 cells/uL CANCELED   Comment: Result canceled by the ancillary.   Absolute Myelocytes  0 cells/uL CANCELED   Comment: Result canceled by the ancillary.   Absolute Promyelocytes  0 cells/uL CANCELED   Comment: Result canceled by the ancillary.   Lymphocytes Absolute  850 - 3900 cells/uL 1,828   Monocytes Absolute  200 - 950 cells/uL 352   Eosinophils Absolute  15 - 500 cells/uL 80   Basophils Absolute  0 - 200 cells/uL 20   Blasts Absolute  0 cells/uL CANCELED   Comment: Result canceled by the ancillary.   NRBC Absolute  0 cells/uL CANCELED   Comment: Result canceled by the ancillary.   Neutrophils Relative  % 43   Bands Absolute  % CANCELED   Comment: Result canceled by the ancillary.   Metamyelocytes Percent  % CANCELED   Comment: Result canceled by the ancillary.   Myelocytes Relative  % CANCELED   Comment: Result canceled by the ancillary.   Promyelocytes Relative  % CANCELED   Comment: Result canceled by the ancillary.   Lymphocytes  % 45.7   Variant lymphocytes/100 WBC (Bld)  0 - 10 % CANCELED   Comment: Result canceled by the ancillary.   Monocytes  % 8.8   Eosinophils  % 2   Basophils Relative  % 0.5   Blasts  % CANCELED   Comment: Result canceled by the ancillary.   nRBC  0 /100 WBC CANCELED   Comment: Result canceled by the  ancillary.   Comment(s) CANCELED   Comment: Result canceled by the ancillary.   Narrative    FASTING:YES    FASTING: YES  Magnesium  Order: [id number]  Component  Ref Range & Units 12/5/24 10:47 AM   Magnesium  1.5 - 2.5 mg/dL 2.1   Narrative  Renal function panel  Order: [id number]  Component  Ref Range & Units 12/5/24 10:47 AM   Glucose  65 - 99 mg/dL 83   Comment:             Fasting reference interval   BUN  7 - 25 mg/dL 11   Creatinine  0.50 - 1.05 mg/dL 0.83   eGFR CKD-EPI CR 2021  > OR = 60 mL/min/1.73m2 77   BUN/Creatinine Ratio  6 - 22 (calc) SEE NOTE:   Comment:    Not Reported: BUN and Creatinine are within     reference range.         Sodium  135 - 146 mmol/L 141   Potassium  3.5 - 5.3 mmol/L 4.1   Chloride  98 - 110 mmol/L 105   Bicarbonate (CO2)  20 - 32 mmol/L 30   Calcium  8.6 - 10.4 mg/dL 9.6   Phosphorus  2.1 - 4.3 mg/dL 4.2   Albumin  3.6 - 5.1 g/dL 4.5   Narrative    FASTING:YES    FASTING: YES    Specimen Collected: 12/05/24 10:47 AM    Performed by: [facility] Last Resulted: 12/06/24  3:37 AM   Received From: [facility]  Result Received: 12/09/24  7:54 AM    View Encounter        Received Information  Ordered On 12/5/2024 10:47 AM    Ordering Provider Authorizing Provider Ordering User Ordering Department   Generic External Data Provider Generic External Data Provider System Default User GENERIC EXTERNAL DATA DEPARTMENT                 Imaging    Renal function panel (Order: [id number]) - 12/5/2024  Lab Order Result Printout    Renal function panel (Order #[id number]) on 12/5/24     External Results Report    Open External Results Report     Lab Component SmartPhrase Guide    Renal function panel (Order #[id number]) on 12/5/24     Specimen Description:     12/9/2024  7:54 AM        suggestion  Information displayed in this report may not trend or trigger automated decision support.     Renal function panel  Order: [id number]  Component  Ref Range & Units Value   Glucose  65 - 99 mg/dL 83    Comment:             Fasting reference interval   BUN  7 - 25 mg/dL 11   Creatinine  0.50 - 1.05 mg/dL 0.83   eGFR CKD-EPI CR 2021  > OR = 60 mL/min/1.73m2 77   BUN/Creatinine Ratio  6 - 22 (calc) SEE NOTE:   Comment:    Not Reported: BUN and Creatinine are within     reference range.         Sodium  135 - 146 mmol/L 141   Potassium  3.5 - 5.3 mmol/L 4.1   Chloride  98 - 110 mmol/L 105   Bicarbonate (CO2)  20 - 32 mmol/L 30   Calcium  8.6 - 10.4 mg/dL 9.6   Phosphorus  2.1 - 4.3 mg/dL 4.2   Albumin  3.6 - 5.1 g/dL 4.5     Administrative Statements   I have spent a total time of 45  minutes in caring for this patient on the day of the visit/encounter including Diagnostic results, Prognosis, Risks and benefits of tx options, Instructions for management, Patient and family education, Importance of tx compliance, Risk factor reductions, Impressions, Counseling / Coordination of care, Documenting in the medical record, Reviewing/placing orders in the medical record (including tests, medications, and/or procedures), Obtaining or reviewing history  , and Communicating with other healthcare professionals .

## 2025-02-19 NOTE — ASSESSMENT & PLAN NOTE
These medications are on stand by for travel     Orders:    methylPREDNISolone 4 MG tablet therapy pack; Use as directed on package ONLY IF NEEDED FOR ACUTE BACK PAIN    TiZANidine (ZANAFLEX) 2 MG capsule; Take 1 capsule (2 mg total) by mouth 3 (three) times a day as needed for muscle spasms for up to 10 days

## 2025-03-19 ENCOUNTER — TELEPHONE (OUTPATIENT)
Dept: FAMILY MEDICINE CLINIC | Facility: CLINIC | Age: 70
End: 2025-03-19

## 2025-03-19 ENCOUNTER — TELEMEDICINE (OUTPATIENT)
Dept: FAMILY MEDICINE CLINIC | Facility: CLINIC | Age: 70
End: 2025-03-19
Payer: MEDICARE

## 2025-03-19 DIAGNOSIS — R29.898 LEG WEAKNESS, BILATERAL: Primary | ICD-10-CM

## 2025-03-19 DIAGNOSIS — M62.569 MUSCLE WASTING AND ATROPHY, NEC, UNSP LOWER LEG: ICD-10-CM

## 2025-03-19 DIAGNOSIS — M62.59 ATROPHY OF MUSCLE OF MULTIPLE SITES: ICD-10-CM

## 2025-03-19 PROCEDURE — 99213 OFFICE O/P EST LOW 20 MIN: CPT | Performed by: NURSE PRACTITIONER

## 2025-03-19 PROCEDURE — G2211 COMPLEX E/M VISIT ADD ON: HCPCS | Performed by: NURSE PRACTITIONER

## 2025-03-19 NOTE — PROGRESS NOTES
Virtual Regular VisitName: Stefanie Cameron      : 1955      MRN: [id number]  Encounter Provider: MARSHALL Patricia  Encounter Date: 3/19/2025   Encounter department: [facility]  :  Assessment & Plan  Leg weakness, bilateral  Increase protein intake   Hydrate well   Physical therapy   Orders:    Ambulatory Referral to Neurology; Future    Ambulatory Referral to Physical Therapy; Future    Atrophy of muscle of multiple sites  Follow up with neurology   Orders:    Ambulatory Referral to Neurology; Future    Ambulatory Referral to Physical Therapy; Future    Muscle wasting and atrophy, NEC, unsp lower leg  Pt concerned with wasting syndrome discussed that her BMI is not that low   And that there are not any diagnostic modalities to determine that   She has had lower extremity muscle loss in her legs for sure bit could be either lack of exercise of innervation issues due to low back pain issues   We will refer to neurology and physical therapy                       History of Present Illness     Pt is a 69 yr old female   Presents via VIRTUAL visit   With concerns of continued weight loss especially in her legs   She has had muscle loss to legs for sure but her BMI has been stable over the last year      Review of Systems   Constitutional:  Positive for unexpected weight change (weight loss over 20 lbs however has been stable at 130 lbs). Negative for fatigue.   HENT:  Negative for congestion, postnasal drip and sore throat.    Respiratory:  Negative for cough and shortness of breath.    Cardiovascular:  Negative for chest pain and palpitations.        HTN Stable    Gastrointestinal:  Negative for abdominal distention, abdominal pain, diarrhea, nausea and vomiting.        GERD   Weight loss has stabilized we had discussed follow up with GI    Endocrine:        Stable Type 2 diabetes - stable    Genitourinary:  Negative for difficulty urinating and flank pain.        CKD under care  of Nephrology    Musculoskeletal:  Negative for arthralgias, back pain, gait problem, myalgias and neck stiffness.        Back pain much improved    Skin:  Negative for rash.   Neurological:  Positive for weakness (lower extremety weakness and decrease muscle strength). Negative for headaches.   Hematological:  Negative for adenopathy.   Psychiatric/Behavioral:  Negative for sleep disturbance and suicidal ideas. The patient is not nervous/anxious.        Objective   There were no vitals taken for this visit.    Physical Exam  Constitutional:       Appearance: Normal appearance.   Pulmonary:      Effort: Pulmonary effort is normal.   Neurological:      Mental Status: She is alert and oriented to person, place, and time.   Psychiatric:         Mood and Affect: Mood normal.         Behavior: Behavior normal.         Administrative Statements   Encounter provider MARSHALL Patricia    The Patient is located at Home and in the following state in which I hold an active license [place].    The patient was identified by name and date of birth. Stefanie Cameron was informed that this is a telemedicine visit and that the visit is being conducted through the Epic Embedded platform. She agrees to proceed..  My office door was closed. No one else was in the room.  She acknowledged consent and understanding of privacy and security of the video platform. The patient has agreed to participate and understands they can discontinue the visit at any time.    I have spent a total time of 40  minutes in caring for this patient on the day of the visit/encounter including Diagnostic results, Prognosis, Risks and benefits of tx options, Instructions for management, Patient and family education, Importance of tx compliance, Risk factor reductions, Impressions, Counseling / Coordination of care, and Documenting in the medical record, not including the time spent for establishing the audio/video connection.

## 2025-03-19 NOTE — TELEPHONE ENCOUNTER
Patient is very concerned with her weight loss, she states she is wasting away.  She has no appetite.  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  She is trying to eat, she isn't sure if she is having issues with her stomach.    Endo put her on Pioglitazone but she doesn't want to take this because it can cause heart failure

## 2025-04-03 ENCOUNTER — EVALUATION (OUTPATIENT)
Dept: PHYSICAL THERAPY | Facility: CLINIC | Age: 70
End: 2025-04-03
Payer: MEDICARE

## 2025-04-03 DIAGNOSIS — R29.898 LEG WEAKNESS, BILATERAL: ICD-10-CM

## 2025-04-03 DIAGNOSIS — M62.59 ATROPHY OF MUSCLE OF MULTIPLE SITES: ICD-10-CM

## 2025-04-03 PROCEDURE — 97161 PT EVAL LOW COMPLEX 20 MIN: CPT | Performed by: PHYSICAL THERAPIST

## 2025-04-03 NOTE — PROGRESS NOTES
PT Evaluation     Today's date: 4/3/2025  Patient name: Stefanie Cameron  : 1955  MRN: [id number]  Referring provider: Becca Quan CRNP  Dx:   Encounter Diagnosis     ICD-10-CM    1. Leg weakness, bilateral  R29.898 Ambulatory Referral to Physical Therapy      2. Atrophy of muscle of multiple sites  M62.59 Ambulatory Referral to Physical Therapy          Start Time: 1445  Stop Time: 1530  Total time in clinic (min): 45 minutes    Assessment  Impairments: abnormal or restricted ROM, activity intolerance, impaired physical strength, lacks appropriate home exercise program, pain with function and weight-bearing intolerance    Assessment details: Stefanie Cameron is a 69 y.o. female who presents with chronic low back pain and c/o LE weakness and atrophy. Patient has lost 5-7 lbs since December. Examination findings demonstrate right hip abductor weakness and limited lumbar ROM. Patient's clinical presentation is consistent with their referring diagnosis of chronic low back pain and muscular weakness. Patient would benefit from skilled physical therapy to address their aforementioned impairments, improve their level of function and to improve their overall quality of life.  Understanding of Dx/Px/POC: excellent     Prognosis: excellent    Goals  Short Term Goals: to be achieved in 4 weeks  1) Patient to be independent with basic HEP.  2) Decrease pain at it's worst to 3/10 on VAS.  3) Increase LE strength by 1/2 MMT grade in all deficient planes.      Long Term Goals: to be achieved by discharge  1) Patient to be independent with comprehensive HEP.  2) Abolish pain for improved quality of life.  43) Increase LE strength to 5/5 MMT grade in all planes to improve A/IADLS.      Plan  Patient would benefit from: skilled PT  Planned modality interventions: biofeedback, cryotherapy, hydrotherapy, TENS, unattended electrical stimulation and low level laser therapy    Planned therapy interventions: activity  modification, ADL retraining, balance, balance/weight bearing training, behavior modification, body mechanics training, functional ROM exercises, gait training, home exercise program, IADL retraining, joint mobilization, manual therapy, massage, neuromuscular re-education, patient education, strengthening, stretching, therapeutic activities, therapeutic exercise and transfer training    Frequency: 2-3x week.  Duration in weeks: 12  Plan of Care beginning date: 4/3/2025  Plan of Care expiration date: 2025  Treatment plan discussed with: patient      Subjective Evaluation    History of Present Illness  Mechanism of injury: Patient reports that she went on vacation at the end of January and aggravated her entire body started hurting while in the spa. Patient states that when she got back she felt like she lost her muscle tone in her legs. Patient has lost 5-7 lbs since December. Patient states that she has had little appetite. Patient went to GI and all testing was negative. Patient went to an endocrinologist who ordered medication to help with her appetite, but did not take it d/t the potential side effects. Patient does not notice any functional weakness of her legs. Patient has a membership to the gym, but has not been lately. Patient denies experiencing tingling/numbness, night pain, and bowel/bladder dysfunction.   Patient Goals  Patient goal: increased LE strength / tone, decreased pain  Pain  Current pain ratin  At best pain ratin  At worst pain ratin  Location: bilateral low back, knees  Quality: dull ache  Alleviating factors: Tramadol, Acupuncture, PT, sauna.    Treatments  Current treatment: physical therapy        Objective     Active Range of Motion     Lumbar   Flexion:  WFL  Extension:  Restriction level: moderate  Left lateral flexion:  Restriction level: minimal  Right lateral flexion:  Restriction level: minimal    Strength/Myotome Testing     Left Hip   Planes of Motion   Flexion:  5  Extension: 5  Abduction: 5    Right Hip   Planes of Motion   Flexion: 5  Extension: 5  Abduction: 4    Left Knee   Flexion: 5  Extension: 5    Right Knee   Flexion: 5  Extension: 5    Left Ankle/Foot   Dorsiflexion: 5    Right Ankle/Foot   Dorsiflexion: 5    Ambulation     Ambulation: Level Surfaces   Ambulation without assistive device: independent    Observational Gait   Gait: within functional limits     Functional Assessment      Squat    Left within functional limits and right within functional limits.                POC expires Unit limit Auth  expiration date PT/OT + Visit Limit?   6/26/25 N/A N/A BOMN                 Visit/Unit Tracking  AUTH Status:  Date 4/3              N/A Used 1               Remaining                  Precautions: DM, polyneuropathy      Manuals 4/3                                                                Neuro Re-Ed                                                                                                        Ther Ex             Nustep: UE/LE             Supine SLR             Side lying hip abduction             Side stepping with TB             Matrix: knee flexion             Matrix: knee extension                                       Ther Activity             Total gym: squats             KB squats             LSD             Stationary lunges                          Gait Training                                       Modalities

## 2025-04-07 ENCOUNTER — OFFICE VISIT (OUTPATIENT)
Dept: PHYSICAL THERAPY | Facility: CLINIC | Age: 70
End: 2025-04-07
Payer: MEDICARE

## 2025-04-07 DIAGNOSIS — R29.898 LEG WEAKNESS, BILATERAL: Primary | ICD-10-CM

## 2025-04-07 DIAGNOSIS — M62.59 ATROPHY OF MUSCLE OF MULTIPLE SITES: ICD-10-CM

## 2025-04-07 PROCEDURE — 97110 THERAPEUTIC EXERCISES: CPT | Performed by: PHYSICAL THERAPIST

## 2025-04-07 NOTE — PROGRESS NOTES
Daily Note     Today's date: 2025  Patient name: Stefanie Cameron  : 1955  MRN: [id number]  Referring provider: Becca Quan CRNP  Dx:   Encounter Diagnosis     ICD-10-CM    1. Leg weakness, bilateral  R29.898       2. Atrophy of muscle of multiple sites  M62.59           Start Time: 933  Stop Time: 102  Total time in clinic (min): 49 minutes    Subjective: Patient reports that she was sore after her IE.      Objective: See treatment diary below      Assessment: Tolerated treatment well. Patient demonstrated fatigue post treatment and would benefit from continued PT. Patient reported increased muscular soreness at end of session. Intermittent cueing needed to perform exercises correctly.      Plan: Continue per plan of care.  Progress treatment as tolerated.         POC expires Unit limit Auth  expiration date PT/OT + Visit Limit?   25 N/A N/A BOMN                 Visit/Unit Tracking  AUTH Status:  Date 4/3 4/7             N/A Used 1 2              Remaining                  Precautions: DM, polyneuropathy      Manuals 4/3 4/7                                                               Neuro Re-Ed                                                                                                        Ther Ex             Nustep: UE/LE  L4 10 min           Supine SLR             Side lying hip abduction             Side stepping with TB  RTB 5 laps           Matrix: knee flexion  20# 2x10           Matrix: knee extension  20# 2x10           Standing hip ext  RTB 2x10                        Ther Activity             Total gym: squats  L22 2x10           KB squats             LSD             Stationary lunges                          Gait Training                                       Modalities

## 2025-04-09 ENCOUNTER — OFFICE VISIT (OUTPATIENT)
Dept: PHYSICAL THERAPY | Facility: CLINIC | Age: 70
End: 2025-04-09
Payer: MEDICARE

## 2025-04-09 DIAGNOSIS — M62.59 ATROPHY OF MUSCLE OF MULTIPLE SITES: ICD-10-CM

## 2025-04-09 DIAGNOSIS — R29.898 LEG WEAKNESS, BILATERAL: Primary | ICD-10-CM

## 2025-04-09 PROCEDURE — 97110 THERAPEUTIC EXERCISES: CPT | Performed by: PHYSICAL THERAPIST

## 2025-04-09 PROCEDURE — 97530 THERAPEUTIC ACTIVITIES: CPT | Performed by: PHYSICAL THERAPIST

## 2025-04-09 NOTE — PROGRESS NOTES
"Daily Note     Today's date: 2025  Patient name: Stefanie Cameron  : 1955  MRN: [id number]  Referring provider: Becca Quan CRNP  Dx:   Encounter Diagnosis     ICD-10-CM    1. Leg weakness, bilateral  R29.898       2. Atrophy of muscle of multiple sites  M62.59           Start Time: 1037  Stop Time: 1126  Total time in clinic (min): 49 minutes    Subjective: Patient reports that her legs are a little sore today.      Objective: See treatment diary below      Assessment: Tolerated treatment well. Patient demonstrated fatigue post treatment and would benefit from continued PT. Patient challenged with strengthening functional activities. Intermittent cueing required for proper technique.      Plan: Continue per plan of care.  Progress treatment as tolerated.         POC expires Unit limit Auth  expiration date PT/OT + Visit Limit?   25 N/A N/A BOMN                 Visit/Unit Tracking  AUTH Status:  Date 4/3 4/7 4/9            N/A Used 1 2 3             Remaining                  Precautions: DM, polyneuropathy      Manuals 4/3 4/7 4/9                                                              Neuro Re-Ed                                                                                                        Ther Ex             Nustep: UE/LE  L4 10 min L6 10 min          Supine SLR             Side lying hip abduction             Side stepping with TB  RTB 5 laps           Matrix: knee flexion  20# 2x10           Matrix: knee extension  20# 2x10           Standing hip ext  RTB 2x10                        Ther Activity             Total gym: squats  L22 2x10 L22 3x10          KB squats   15# 3x10          LSD   6\" 2x10 b/l          Stationary lunges   2x10 b/l                       Gait Training                                       Modalities                                              "

## 2025-04-18 DIAGNOSIS — E53.8 VITAMIN B12 DEFICIENCY: Primary | ICD-10-CM

## 2025-04-18 RX ORDER — LANOLIN ALCOHOL/MO/W.PET/CERES
1000 CREAM (GRAM) TOPICAL DAILY
Qty: 100 TABLET | Refills: 1 | Status: SHIPPED | OUTPATIENT
Start: 2025-04-18

## 2025-04-18 NOTE — TELEPHONE ENCOUNTER
Reason for call:   [x] Refill   [] Prior Auth  [] Other: last filled by previous provider who retired, pt asking if pcp will now take over refills     Office:   [x] PCP/Provider - Becca Quan   [] Specialty/Provider -     Medication: vitamin B-12     Dose/Frequency: 1000 mcg take daily     Quantity: 100    Pharmacy: [facility]   Does the patient have enough for 3 days?   [] Yes   [x] No - Send as HP to POD- only has 2 left     Mail Away Pharmacy   Does the patient have enough for 10 days?   [] Yes   [] No - Send as HP to POD

## 2025-04-21 ENCOUNTER — OFFICE VISIT (OUTPATIENT)
Dept: PHYSICAL THERAPY | Facility: CLINIC | Age: 70
End: 2025-04-21
Attending: NURSE PRACTITIONER
Payer: MEDICARE

## 2025-04-21 DIAGNOSIS — M62.59 ATROPHY OF MUSCLE OF MULTIPLE SITES: ICD-10-CM

## 2025-04-21 DIAGNOSIS — R29.898 LEG WEAKNESS, BILATERAL: Primary | ICD-10-CM

## 2025-04-21 PROCEDURE — 97110 THERAPEUTIC EXERCISES: CPT | Performed by: PHYSICAL THERAPIST

## 2025-04-21 PROCEDURE — 97530 THERAPEUTIC ACTIVITIES: CPT | Performed by: PHYSICAL THERAPIST

## 2025-04-24 ENCOUNTER — DOCUMENTATION (OUTPATIENT)
Dept: ADMINISTRATIVE | Facility: OTHER | Age: 70
End: 2025-04-24

## 2025-04-24 ENCOUNTER — APPOINTMENT (OUTPATIENT)
Dept: PHYSICAL THERAPY | Facility: CLINIC | Age: 70
End: 2025-04-24
Attending: NURSE PRACTITIONER
Payer: MEDICARE

## 2025-04-24 NOTE — PROGRESS NOTES
04/24/25 11:10 AM    CRC outreach is not required, Colonoscopy/ FIT/ other screening completed.    Thank you.  Cady Cheek  [facility]

## 2025-04-28 ENCOUNTER — OFFICE VISIT (OUTPATIENT)
Dept: PHYSICAL THERAPY | Facility: CLINIC | Age: 70
End: 2025-04-28
Attending: NURSE PRACTITIONER
Payer: MEDICARE

## 2025-04-28 DIAGNOSIS — M62.59 ATROPHY OF MUSCLE OF MULTIPLE SITES: ICD-10-CM

## 2025-04-28 DIAGNOSIS — R29.898 LEG WEAKNESS, BILATERAL: Primary | ICD-10-CM

## 2025-04-28 PROCEDURE — 97110 THERAPEUTIC EXERCISES: CPT | Performed by: PHYSICAL THERAPIST

## 2025-04-28 NOTE — PROGRESS NOTES
"Daily Note     Today's date: 2025  Patient name: Stefanie Cameron  : 1955  MRN: [id number]  Referring provider: Becca Quan CRNP  Dx:   Encounter Diagnosis     ICD-10-CM    1. Leg weakness, bilateral  R29.898       2. Atrophy of muscle of multiple sites  M62.59           Start Time: 1101  Stop Time: 1154  Total time in clinic (min): 53 minutes    Subjective: Patient reports that her legs are a little sore.      Objective: See treatment diary below      Assessment: Tolerated treatment well. Patient demonstrated fatigue post treatment and would benefit from continued PT. Patient reported increased hip and quad soreness following there ex.      Plan: Continue per plan of care.  Progress treatment as tolerated.         POC expires Unit limit Auth  expiration date PT/OT + Visit Limit?   25 N/A N/A BOMN                 Visit/Unit Tracking  AUTH Status:  Date 4/3 4/7 4/9 4/21 4/28          N/A Used 1 2 3 4 5           Remaining                  Precautions: DM, polyneuropathy      Manuals 4/3 4/7 4/9 4/21 4/28                                                            Neuro Re-Ed                                                                                                        Ther Ex             Nustep: UE/LE  L4 10 min L6 10 min L6 10 min L6 10 min        Supine SLR             Side lying hip abduction             Side stepping with TB  RTB 5 laps  RTB 7 laps         Matrix: knee flexion  20# 2x10  20# 4x10 30# 4x10        Matrix: knee extension  20# 2x10  20# 3x10 20# 3x10        Standing hip ext  RTB 2x10           Asim: 4 way hip    NV 3# 25x ea.        Ther Activity             Total gym: squats  L22 2x10 L22 3x10 L22 3x10         KB squats   15# 3x10 15# 3x12         LSD   6\" 2x10 b/l 6\" 3x10 b/l         Stationary lunges   2x10 b/l                       Gait Training                                       Modalities                                                  "

## 2025-04-30 ENCOUNTER — OFFICE VISIT (OUTPATIENT)
Dept: PHYSICAL THERAPY | Facility: CLINIC | Age: 70
End: 2025-04-30
Attending: NURSE PRACTITIONER
Payer: MEDICARE

## 2025-04-30 DIAGNOSIS — M62.59 ATROPHY OF MUSCLE OF MULTIPLE SITES: ICD-10-CM

## 2025-04-30 DIAGNOSIS — R29.898 LEG WEAKNESS, BILATERAL: Primary | ICD-10-CM

## 2025-04-30 PROCEDURE — 97110 THERAPEUTIC EXERCISES: CPT | Performed by: PHYSICAL THERAPIST

## 2025-04-30 NOTE — PROGRESS NOTES
"Daily Note     Today's date: 2025  Patient name: Stefanie Cameron  : 1955  MRN: [id number]  Referring provider: Becca Quan CRNP  Dx:   Encounter Diagnosis     ICD-10-CM    1. Leg weakness, bilateral  R29.898       2. Atrophy of muscle of multiple sites  M62.59           Start Time: 1215  Stop Time: 1300  Total time in clinic (min): 45 minutes    Subjective: Patient reports that she had increased muscle soreness after her last visit.      Objective: See treatment diary below      Assessment: Tolerated treatment well. Patient demonstrated fatigue post treatment and would benefit from continued PT. No significant changes to overall status. Patient reported fatigue at end of session and reviewed HEP.      Plan: Continue per plan of care.  Progress treatment as tolerated.         POC expires Unit limit Auth  expiration date PT/OT + Visit Limit?   25 N/A N/A BOMN                 Visit/Unit Tracking  AUTH Status:  Date 4/3 4/7 4/9 4/21 4/28          N/A Used 1 2 3 4 5           Remaining                  Precautions: DM, polyneuropathy      Manuals 4/3 4/7 4/9 4/21 4/28                                                            Neuro Re-Ed                                                                                                        Ther Ex             Nustep: UE/LE  L4 10 min L6 10 min L6 10 min L6 10 min L8 10 min       Supine SLR      2# 1x10 b/l       Side lying hip abduction      2# 2x10 b/l       Side stepping with TB  RTB 5 laps  RTB 7 laps  RTB 11 laps       Matrix: knee flexion  20# 2x10  20# 4x10 30# 4x10        Matrix: knee extension  20# 2x10  20# 3x10 20# 3x10        Standing hip ext  RTB 2x10           Benton: 4 way hip    NV 3# 25x ea.        Ther Activity             Total gym: squats  L22 2x10 L22 3x10 L22 3x10         KB squats   15# 3x10 15# 3x12         LSD   6\" 2x10 b/l 6\" 3x10 b/l         Stationary lunges   2x10 b/l                       Gait Training                   "                     Modalities

## 2025-05-05 ENCOUNTER — OFFICE VISIT (OUTPATIENT)
Dept: PHYSICAL THERAPY | Facility: CLINIC | Age: 70
End: 2025-05-05
Attending: NURSE PRACTITIONER
Payer: MEDICARE

## 2025-05-05 DIAGNOSIS — M62.59 ATROPHY OF MUSCLE OF MULTIPLE SITES: ICD-10-CM

## 2025-05-05 DIAGNOSIS — R29.898 LEG WEAKNESS, BILATERAL: Primary | ICD-10-CM

## 2025-05-05 PROCEDURE — 97530 THERAPEUTIC ACTIVITIES: CPT | Performed by: PHYSICAL THERAPIST

## 2025-05-05 PROCEDURE — 97110 THERAPEUTIC EXERCISES: CPT | Performed by: PHYSICAL THERAPIST

## 2025-05-05 NOTE — PROGRESS NOTES
Daily Note     Today's date: 2025  Patient name: Stefanie Cameron  : 1955  MRN: [id number]  Referring provider: Becca Quan CRNP  Dx:   Encounter Diagnosis     ICD-10-CM    1. Leg weakness, bilateral  R29.898       2. Atrophy of muscle of multiple sites  M62.59           Start Time: 1015  Stop Time: 1123  Total time in clinic (min): 68 minutes    Subjective: Patient reports that she was having left-sided sciatic pain over the weekend when she was sitting at a comedy show.      Objective: See treatment diary below      Assessment: Tolerated treatment well. Patient demonstrated fatigue post treatment and would benefit from continued PT. Patient reported left glute and hamstring pain with seated h/s curl. Patient overall reporting improved LE strength.      Plan: Continue per plan of care.  Progress treatment as tolerated.         POC expires Unit limit Auth  expiration date PT/OT + Visit Limit?   25 N/A N/A BOMN                 Visit/Unit Tracking  AUTH Status:  Date 4/3 4/7 4/9 4/21 4/28 5/5         N/A Used 1 2 3 4 5 6          Remaining                  Precautions: DM, polyneuropathy      Manuals 4/3 4/7 4/9 4/21 4/28 4/30 5/5                                                          Neuro Re-Ed                                                                                                        Ther Ex             Nustep: UE/LE  L4 10 min L6 10 min L6 10 min L6 10 min L8 10 min L6 10 min      Supine SLR      2# 1x10 b/l       Side lying hip abduction      2# 2x10 b/l       Side stepping with TB  RTB 5 laps  RTB 7 laps  RTB 11 laps RTB  laps (length of gym)      Matrix: knee flexion  20# 2x10  20# 4x10 30# 4x10  30# 3x10      Matrix: knee extension  20# 2x10  20# 3x10 20# 3x10  20# 3x10      Standing hip ext  RTB 2x10           Aism: 4 way hip    NV 3# 25x ea.        Hip abduction clock       RTB 2x5 b/l      Ther Activity             Total gym: squats  L22 2x10 L22 3x10 L22 3x10   L22 3x10  "     KB squats   15# 3x10 15# 3x12         LSD   6\" 2x10 b/l 6\" 3x10 b/l   6\" x10 ea.      Stationary lunges   2x10 b/l    2x10 b/l                   Gait Training                                       Modalities                                                      "

## 2025-05-07 ENCOUNTER — EVALUATION (OUTPATIENT)
Dept: PHYSICAL THERAPY | Facility: CLINIC | Age: 70
End: 2025-05-07
Attending: NURSE PRACTITIONER
Payer: MEDICARE

## 2025-05-07 DIAGNOSIS — M62.59 ATROPHY OF MUSCLE OF MULTIPLE SITES: ICD-10-CM

## 2025-05-07 DIAGNOSIS — R29.898 LEG WEAKNESS, BILATERAL: Primary | ICD-10-CM

## 2025-05-07 PROCEDURE — 97530 THERAPEUTIC ACTIVITIES: CPT | Performed by: PHYSICAL THERAPIST

## 2025-05-07 PROCEDURE — 97110 THERAPEUTIC EXERCISES: CPT | Performed by: PHYSICAL THERAPIST

## 2025-05-07 NOTE — PROGRESS NOTES
"Daily Note     Today's date: 2025  Patient name: Stefanie Cameron  : 1955  MRN: [id number]  Referring provider: Becca Quan CRNP  Dx:   Encounter Diagnosis     ICD-10-CM    1. Leg weakness, bilateral  R29.898       2. Atrophy of muscle of multiple sites  M62.59                      Subjective: ***      Objective: See treatment diary below      Assessment: Tolerated treatment well. Patient demonstrated fatigue post treatment and would benefit from continued PT.       Plan: Continue per plan of care.  Progress treatment as tolerated.         POC expires Unit limit Auth  expiration date PT/OT + Visit Limit?   25 N/A N/A BOMN                 Visit/Unit Tracking  AUTH Status:  Date 4/3 4/7 4/9 4/21 4/28 5/5 5/7        N/A Used 1 2 3 4 5  7         Remaining                  Precautions: DM, polyneuropathy      Manuals 4/3 4/7 4/9 4/21 4/28 4/30 5/5 5/7                                                         Neuro Re-Ed                                                                                                        Ther Ex             Nustep: UE/LE  L4 10 min L6 10 min L6 10 min L6 10 min L8 10 min L6 10 min      Supine SLR      2# 1x10 b/l       Side lying hip abduction      2# 2x10 b/l       Side stepping with TB  RTB 5 laps  RTB 7 laps  RTB 11 laps RTB  laps (length of gym)      Matrix: knee flexion  20# 2x10  20# 4x10 30# 4x10  30# 3x10      Matrix: knee extension  20# 2x10  20# 3x10 20# 3x10  20# 3x10      Standing hip ext  RTB 2x10           Craig: 4 way hip    NV 3# 25x ea.        Hip abduction clock       RTB 2x5 b/l      Ther Activity             Total gym: squats  L22 2x10 L22 3x10 L22 3x10   L22 3x10      KB squats   15# 3x10 15# 3x12         LSD   6\" 2x10 b/l 6\" 3x10 b/l   6\" x10 ea.      Stationary lunges   2x10 b/l    2x10 b/l                   Gait Training                                       Modalities                                                        "

## 2025-05-07 NOTE — PROGRESS NOTES
PT Re-Evaluation     Today's date: 2025  Patient name: Stefanie Cameron  : 1955  MRN: [id number]  Referring provider: Becca Quan CRNP  Dx:   Encounter Diagnosis     ICD-10-CM    1. Leg weakness, bilateral  R29.898       2. Atrophy of muscle of multiple sites  M62.59           Start Time: 1105  Stop Time: 1200  Total time in clinic (min): 55 minutes    Assessment  Impairments: abnormal or restricted ROM, activity intolerance, impaired physical strength, lacks appropriate home exercise program, pain with function and weight-bearing intolerance    Assessment details: Since beginning physical therapy, Stefanie has attended a total number of 7 visits and has maintained excellent compliance with established POC. Patient has made significant improvements in all areas, including decreased pain, increased LE strength and improved tone in legs. Examination findings demonstrate improved LE strength and activity tolerance. Patient does continue to c/o intermittent pain in her back and legs. Patient will trial gym program and will begin to transition to HEP. Patient would continue to benefit from skilled physical therapy to address her aforementioned impairments, improve their level of function and to improve their overall quality of life.  Understanding of Dx/Px/POC: excellent     Prognosis: excellent    Goals  Short Term Goals: to be achieved in 4 weeks  1) Patient to be independent with basic HEP. MET  2) Decrease pain at it's worst to 3/10 on VAS. PROGRESSED, BUT NOT MET  3) Increase LE strength by 1/2 MMT grade in all deficient planes. MET      Long Term Goals: to be achieved by discharge ALL GOALS PROGRESSING  1) Patient to be independent with comprehensive HEP.  2) Abolish pain for improved quality of life.  43) Increase LE strength to 5/5 MMT grade in all planes to improve A/IADLS.      Plan  Patient would benefit from: skilled PT  Planned modality interventions: biofeedback, cryotherapy, hydrotherapy,  TENS, unattended electrical stimulation and low level laser therapy    Planned therapy interventions: activity modification, ADL retraining, balance, balance/weight bearing training, behavior modification, body mechanics training, functional ROM exercises, gait training, home exercise program, IADL retraining, joint mobilization, manual therapy, massage, neuromuscular re-education, patient education, strengthening, stretching, therapeutic activities, therapeutic exercise and transfer training    Frequency: 2-3x week.  Duration in weeks: 8  Plan of Care beginning date: 2025  Plan of Care expiration date: 2025  Treatment plan discussed with: patient      Subjective Evaluation    History of Present Illness  Mechanism of injury: Patient reports that the tone and strength in her legs have improved. Patient has not checked her weight, but does not believe she's lost weight.       Patient Goals  Patient goal: increased LE strength / tone, decreased pain  Pain  Current pain ratin  At best pain ratin  At worst pain ratin  Location: bilateral low back, knees  Quality: dull ache  Alleviating factors: Tramadol, Acupuncture, PT, sauna.    Treatments  Current treatment: physical therapy        Objective     Active Range of Motion     Lumbar   Flexion:  WFL  Extension:  Restriction level: moderate  Left lateral flexion:  Restriction level: minimal  Right lateral flexion:  Restriction level: minimal    Strength/Myotome Testing     Left Hip   Planes of Motion   Flexion: 5  Extension: 5  Abduction: 5    Right Hip   Planes of Motion   Flexion: 5  Extension: 5  Abduction: 4+    Left Knee   Flexion: 5  Extension: 5    Right Knee   Flexion: 5  Extension: 5    Left Ankle/Foot   Dorsiflexion: 5    Right Ankle/Foot   Dorsiflexion: 5    Ambulation     Ambulation: Level Surfaces   Ambulation without assistive device: independent    Observational Gait   Gait: within functional limits     Functional Assessment      Squat   "  Left within functional limits and right within functional limits.                POC expires Unit limit Auth  expiration date PT/OT + Visit Limit?   7/2/25 N/A N/A BOMN                 Visit/Unit Tracking  AUTH Status:  Date 4/3 4/7 4/9 4/21 4/28 5/5 5/7        N/A Used 1 2 3 4 5 6 7         Remaining                  Precautions: DM, polyneuropathy      Manuals 4/3 4/7 4/9 4/21 4/28 4/30 5/5 5/7     Reassessment                                                    Neuro Re-Ed                                                                                                        Ther Ex             Nustep: UE/LE  L4 10 min L6 10 min L6 10 min L6 10 min L8 10 min L6 10 min L6 10 min     Supine SLR      2# 1x10 b/l       Side lying hip abduction      2# 2x10 b/l       Side stepping with TB  RTB 5 laps  RTB 7 laps  RTB 11 laps RTB  laps (length of gym)      Matrix: knee flexion  20# 2x10  20# 4x10 30# 4x10  30# 3x10 30# 3x10     Matrix: knee extension  20# 2x10  20# 3x10 20# 3x10  20# 3x10 30# 3x10     Standing hip ext  RTB 2x10           [place]: 4 way hip    NV 3# 25x ea.        Hip abduction clock       RTB 2x5 b/l      Ther Activity             Total gym: squats  L22 2x10 L22 3x10 L22 3x10   L22 3x10 L22 3x10     KB squats   15# 3x10 15# 3x12         LSD   6\" 2x10 b/l 6\" 3x10 b/l   6\" x10 ea. 8\" 2x10 ea.     Stationary lunges   2x10 b/l    2x10 b/l                   Gait Training                                       Modalities                                            "

## 2025-05-21 ENCOUNTER — OFFICE VISIT (OUTPATIENT)
Dept: PHYSICAL THERAPY | Facility: CLINIC | Age: 70
End: 2025-05-21
Attending: NURSE PRACTITIONER
Payer: MEDICARE

## 2025-05-21 DIAGNOSIS — M62.59 ATROPHY OF MUSCLE OF MULTIPLE SITES: ICD-10-CM

## 2025-05-21 DIAGNOSIS — R29.898 LEG WEAKNESS, BILATERAL: Primary | ICD-10-CM

## 2025-05-21 PROCEDURE — 97110 THERAPEUTIC EXERCISES: CPT | Performed by: PHYSICAL THERAPIST

## 2025-05-21 NOTE — PROGRESS NOTES
Daily Note     Today's date: 2025  Patient name: Stefanie Cameron  : 1955  MRN: [id number]  Referring provider: Becca Quan CRNP  Dx:   Encounter Diagnosis     ICD-10-CM    1. Leg weakness, bilateral  R29.898       2. Atrophy of muscle of multiple sites  M62.59           Start Time: 1015  Stop Time: 1110  Total time in clinic (min): 55 minutes    Subjective: Patient reports that her thighs feels like they are stronger and more firm, but has noticed some lack of tone over her inner thighs.      Objective: See treatment diary below      Assessment: Tolerated treatment well. Patient demonstrated fatigue post treatment and would benefit from continued PT. Patient reported increased gluteal soreness at end of session.      Plan: Continue per plan of care.  Progress treatment as tolerated.         POC expires Unit limit Auth  expiration date PT/OT + Visit Limit?   25 N/A N/A BOMN                 Visit/Unit Tracking  AUTH Status:  Date 4/3 4/7 4/9 4/21 4/28 5/5 5/7 5/21       N/A Used 1 2 3 4 5 6 7 8        Remaining                  Precautions: DM, polyneuropathy      Manuals 4/3 4/7 4/9 4/21 4/28 4/30 5/5 5/7 5/21    Reassessment        GR                                            Neuro Re-Ed                                                                                                        Ther Ex             Nustep: UE/LE  L4 10 min L6 10 min L6 10 min L6 10 min L8 10 min L6 10 min L6 10 min L6 10 min    Supine SLR      2# 1x10 b/l       Side lying hip abduction      2# 2x10 b/l       Side stepping with TB  RTB 5 laps  RTB 7 laps  RTB 11 laps RTB  laps (length of gym)      Matrix: knee flexion  20# 2x10  20# 4x10 30# 4x10  30# 3x10 30# 3x10     Matrix: knee extension  20# 2x10  20# 3x10 20# 3x10  20# 3x10 30# 3x10     Standing hip ext  RTB 2x10           [place]: 4 way hip    NV 3# 25x ea.    3# x30 ea. B/l    Hip abduction clock       RTB 2x5 b/l      Side lying hip adduction         3x10 b/l  "   Ther Activity             Total gym: squats  L22 2x10 L22 3x10 L22 3x10   L22 3x10 L22 3x10     KB squats   15# 3x10 15# 3x12         LSD   6\" 2x10 b/l 6\" 3x10 b/l   6\" x10 ea. 8\" 2x10 ea.     Stationary lunges   2x10 b/l    2x10 b/l                   Gait Training                                       Modalities                                            "

## 2025-05-23 ENCOUNTER — OFFICE VISIT (OUTPATIENT)
Dept: PHYSICAL THERAPY | Facility: CLINIC | Age: 70
End: 2025-05-23
Attending: NURSE PRACTITIONER
Payer: MEDICARE

## 2025-05-23 DIAGNOSIS — M62.59 ATROPHY OF MUSCLE OF MULTIPLE SITES: ICD-10-CM

## 2025-05-23 DIAGNOSIS — R29.898 LEG WEAKNESS, BILATERAL: Primary | ICD-10-CM

## 2025-05-23 PROCEDURE — 97530 THERAPEUTIC ACTIVITIES: CPT | Performed by: PHYSICAL THERAPIST

## 2025-05-23 PROCEDURE — 97110 THERAPEUTIC EXERCISES: CPT | Performed by: PHYSICAL THERAPIST

## 2025-05-23 NOTE — PROGRESS NOTES
Daily Note     Today's date: 2025  Patient name: Stefanie Cameron  : 1955  MRN: [id number]  Referring provider: Becca Quan CRNP  Dx:   Encounter Diagnosis     ICD-10-CM    1. Leg weakness, bilateral  R29.898       2. Atrophy of muscle of multiple sites  M62.59           Start Time: 1145  Stop Time: 1240  Total time in clinic (min): 55 minutes    Subjective: Patient reports that her thighs are sore.       Objective: See treatment diary below      Assessment: Tolerated treatment well. Patient demonstrated fatigue post treatment and would benefit from continued PT. Patient with increased muscle soreness at end of session. Patient overall progressing per POC.       Plan: Continue per plan of care.  Progress treatment as tolerated.         POC expires Unit limit Auth  expiration date PT/OT + Visit Limit?   25 N/A N/A BOMN                 Visit/Unit Tracking  AUTH Status:  Date 4/3 4/7 4/9 4/21 4/28 5/5 5/7 5/21 5/23      N/A Used 1 2 3 4 5 6 7 8 9       Remaining                  Precautions: DM, polyneuropathy      Manuals 4/3 4/7 4/9 4/21 4/28 4/30 5/5 5/7 5/21 5/23   Reassessment        GR                                            Neuro Re-Ed                                                                                                        Ther Ex             Nustep: UE/LE  L4 10 min L6 10 min L6 10 min L6 10 min L8 10 min L6 10 min L6 10 min L6 10 min L6 10 min   Supine SLR      2# 1x10 b/l       Side lying hip abduction      2# 2x10 b/l       Side stepping with TB  RTB 5 laps  RTB 7 laps  RTB 11 laps RTB  laps (length of gym)      Matrix: knee flexion  20# 2x10  20# 4x10 30# 4x10  30# 3x10 30# 3x10  30# 4x10   Matrix: knee extension  20# 2x10  20# 3x10 20# 3x10  20# 3x10 30# 3x10  30# 4x10   Standing hip ext  RTB 2x10           [place]: 4 way hip    NV 3# 25x ea.    3# x30 ea. B/l    Hip abduction clock       RTB 2x5 b/l   RTB 2x5 b/l   Side lying hip adduction         3x10 b/l    Ther  "Activity             Total gym: squats  L22 2x10 L22 3x10 L22 3x10   L22 3x10 L22 3x10  L22 2x10 single leg   KB squats   15# 3x10 15# 3x12         LSD   6\" 2x10 b/l 6\" 3x10 b/l   6\" x10 ea. 8\" 2x10 ea.  4\" x10 b/l   Stationary lunges   2x10 b/l    2x10 b/l      Star slider          X10 b/l   Gait Training                                       Modalities                                              "

## 2025-05-28 ENCOUNTER — OFFICE VISIT (OUTPATIENT)
Dept: PHYSICAL THERAPY | Facility: CLINIC | Age: 70
End: 2025-05-28
Attending: NURSE PRACTITIONER
Payer: MEDICARE

## 2025-05-28 DIAGNOSIS — R29.898 LEG WEAKNESS, BILATERAL: Primary | ICD-10-CM

## 2025-05-28 DIAGNOSIS — M62.59 ATROPHY OF MUSCLE OF MULTIPLE SITES: ICD-10-CM

## 2025-05-28 PROCEDURE — 97530 THERAPEUTIC ACTIVITIES: CPT | Performed by: PHYSICAL THERAPIST

## 2025-05-28 PROCEDURE — 97110 THERAPEUTIC EXERCISES: CPT | Performed by: PHYSICAL THERAPIST

## 2025-05-28 NOTE — PROGRESS NOTES
Daily Note     Today's date: 2025  Patient name: Stefanie Cameron  : 1955  MRN: [id number]  Referring provider: Becca Quan CRNP  Dx:   Encounter Diagnosis     ICD-10-CM    1. Leg weakness, bilateral  R29.898       2. Atrophy of muscle of multiple sites  M62.59           Start Time: 1135  Stop Time: 1220  Total time in clinic (min): 45 minutes    Subjective: Patient offers no new complaints.      Objective: See treatment diary below      Assessment: Tolerated treatment well. Patient demonstrated fatigue post treatment and would benefit from continued PT. Reviewed and updated HEP. Patient with no significant changes overall.      Plan: Potential discharge next visit.       POC expires Unit limit Auth  expiration date PT/OT + Visit Limit?   25 N/A N/A BOMN                 Visit/Unit Tracking  AUTH Status:  Date 4/3 4/7 4/9 4/21 4/28 5/5 5/7 5/21 5/23 5/28     N/A Used 1 2 3 4 5 6 7 8 9 10      Remaining                  Precautions: DM, polyneuropathy      Manuals    Reassessment        GR                                            Neuro Re-Ed                                                                                                        Ther Ex             Nustep: UE/LE L6 10 min  L6 10 min L6 10 min L6 10 min L8 10 min L6 10 min L6 10 min L6 10 min L6 10 min   Supine SLR      2# 1x10 b/l       Side lying hip abduction      2# 2x10 b/l       Side stepping with TB    RTB 7 laps  RTB 11 laps RTB  laps (length of gym)      Matrix: knee flexion 30# 3x10   20# 4x10 30# 4x10  30# 3x10 30# 3x10  30# 4x10   Matrix: knee extension 30# 3x10   20# 3x10 20# 3x10  20# 3x10 30# 3x10  30# 4x10   Standing hip ext             [place]: 4 way hip    [place] 3# 25x ea.    3# x30 ea. B/l    Hip abduction clock       RTB 2x5 b/l   RTB 2x5 b/l   Side lying hip adduction         3x10 b/l    Ther Activity             Total gym: squats L22 s3x10 single leg  L22 3x10 L22 3x10    "L22 3x10 L22 3x10  L22 2x10 single leg   KB squats 15# 3x10  15# 3x10 15# 3x12         LSD   6\" 2x10 b/l 6\" 3x10 b/l   6\" x10 ea. 8\" 2x10 ea.  4\" x10 b/l   Stationary lunges   2x10 b/l    2x10 b/l      Star slider X10 ea. B/l         X10 b/l   Gait Training                                       Modalities                                       Access Code: [email or web address]  URL: [email or web address]  Date: 05/28/2025  Prepared by: Luis Novak    Exercises  - Supine Active Straight Leg Raise  - 1 x daily - 7 x weekly - 3 sets - 10 reps  - Prone Hip Extension  - 1 x daily - 7 x weekly - 3 sets - 10 reps  - Sidelying Hip Adduction  - 1 x daily - 7 x weekly - 3 sets - 10 reps  - Sidelying Hip Abduction  - 1 x daily - 4 x weekly - 2-3 sets - 10 reps  - Hip Extension with Resistance Loop  - 1 x daily - 4 x weekly - 2-3 sets - 10 reps  - Hip Abduction with Resistance Loop  - 1 x daily - 4 x weekly - 2-3 sets - 10 reps  - Standing Hip Adduction with Anchored Resistance  - 1 x daily - 4 x weekly - 2-3 sets - 10 reps  - Standing Hip Flexion with Anchored Resistance  - 1 x daily - 4 x weekly - 2-3 sets - 10 reps  - Side Stepping with Resistance at Ankles  - 1 x daily - 4 x weekly - 1 sets - 10 reps  - Squat with Chair Touch  - 1 x daily - 4 x weekly - 2-3 sets - 10 reps  - Reverse Lunge  - 1 x daily - 4 x weekly - 2-3 sets - 10 reps  - Lateral Step Down  - 1 x daily - 4 x weekly - 2-3 sets - 10 reps  - Sitting Knee Extension with Resistance  - 1 x daily - 4 x weekly - 2-3 sets - 10 reps  - Standing Isometric Hip Abduction with Knee at 90 at Wall  - 1 x daily - 4 x weekly - 2 sets - 10 reps - 5 seconds hold       "

## 2025-05-30 ENCOUNTER — OFFICE VISIT (OUTPATIENT)
Dept: PHYSICAL THERAPY | Facility: CLINIC | Age: 70
End: 2025-05-30
Attending: NURSE PRACTITIONER
Payer: MEDICARE

## 2025-05-30 DIAGNOSIS — R29.898 LEG WEAKNESS, BILATERAL: Primary | ICD-10-CM

## 2025-05-30 DIAGNOSIS — M62.59 ATROPHY OF MUSCLE OF MULTIPLE SITES: ICD-10-CM

## 2025-05-30 PROCEDURE — 97110 THERAPEUTIC EXERCISES: CPT | Performed by: PHYSICAL THERAPIST

## 2025-05-30 NOTE — PROGRESS NOTES
Discharge     Today's date: 2025  Patient name: Stefanie Cameron  : 1955  MRN: [id number]  Referring provider: Becca Quan CRNP  Dx:   Encounter Diagnosis     ICD-10-CM    1. Leg weakness, bilateral  R29.898       2. Atrophy of muscle of multiple sites  M62.59           Start Time: 1015  Stop Time: 1120  Total time in clinic (min): 65 minutes    Subjective: Patient reports that she continues to have intermittent cramping and pain into her legs.      Objective: See treatment diary below      Assessment: Tolerated treatment well. Reviewed comprehensive HEP and discussed modifications to program. Patient feels independent with HEP and is prepared for D/C at this time. Patient instructed to contact PT if she notices a decline in function or if she has any questions/concerns. Patient overall feeling stronger with less atrophy. Patient does have intermittent cramping and pain into her legs, instructed to contact ortho regarding possible imaging. Patient instructed to increase her water intake as well.      Plan: Discharge to HEP.       POC expires Unit limit Auth  expiration date PT/OT + Visit Limit?   25 N/A N/A BOMN                 Visit/Unit Tracking  AUTH Status:  Date 4/3 4/7 4/9 4/21 4/28 5/5 5/7 5/21 5/23 5/28 5/30    N/A Used 1 2 3 4 5 6 7 8 9 10 11     Remaining                  Precautions: DM, polyneuropathy      Manuals    Reassessment        GR                                            Neuro Re-Ed                                                                                                        Ther Ex             Nust[place]: UE/LE L6 10 min L6 10 min  L6 10 min L6 10 min L8 10 min L6 10 min L6 10 min L6 10 min L6 10 min   Supine SLR  Reviewed with TB    2# 1x10 b/l       Side lying hip abduction      2# 2x10 b/l       Side stepping with TB    RTB 7 laps  RTB 11 laps RTB  laps (length of gym)      Matrix: knee flexion 30# 3x10   20# 4x10 30#  "4x10  30# 3x10 30# 3x10  30# 4x10   Matrix: knee extension 30# 3x10   20# 3x10 20# 3x10  20# 3x10 30# 3x10  30# 4x10   Standing hip ext             [place]: 4 way hip  3# 3x10 ea. B/l  [place] 3# 25x ea.    3# x30 ea. B/l    Hip abduction clock       RTB 2x5 b/l   RTB 2x5 b/l   Side lying hip adduction         3x10 b/l    Prone hip ext. With TB  RTB x10           Patient education: HEP review  GR           Standing isometric hip abd against wall at 90 deg flex  10x5\" b/l           Ther Activity             Total gym: squats L22 s3x10 single leg   L22 3x10   L22 3x10 L22 3x10  L22 2x10 single leg   KB squats 15# 3x10   15# 3x12         LSD    6\" 3x10 b/l   6\" x10 ea. 8\" 2x10 ea.  4\" x10 b/l   Stationary lunges       2x10 b/l      Star slider X10 ea. B/l         X10 b/l   Gait Training                                       Modalities                                       Access Code: [email or web address]  URL: [email or web address]  Date: 05/28/2025  Prepared by: Luis Novak    Exercises  - Supine Active Straight Leg Raise  - 1 x daily - 7 x weekly - 3 sets - 10 reps  - Prone Hip Extension  - 1 x daily - 7 x weekly - 3 sets - 10 reps  - Sidelying Hip Adduction  - 1 x daily - 7 x weekly - 3 sets - 10 reps  - Sidelying Hip Abduction  - 1 x daily - 4 x weekly - 2-3 sets - 10 reps  - Hip Extension with Resistance Loop  - 1 x daily - 4 x weekly - 2-3 sets - 10 reps  - Hip Abduction with Resistance Loop  - 1 x daily - 4 x weekly - 2-3 sets - 10 reps  - Standing Hip Adduction with Anchored Resistance  - 1 x daily - 4 x weekly - 2-3 sets - 10 reps  - Standing Hip Flexion with Anchored Resistance  - 1 x daily - 4 x weekly - 2-3 sets - 10 reps  - Side Stepping with Resistance at Ankles  - 1 x daily - 4 x weekly - 1 sets - 10 reps  - Squat with Chair Touch  - 1 x daily - 4 x weekly - 2-3 sets - 10 reps  - Reverse Lunge  - 1 x daily - 4 x weekly - 2-3 sets - 10 reps  - Lateral Step Down  - 1 x daily - 4 x weekly - 2-3 sets - 10 " reps  - Sitting Knee Extension with Resistance  - 1 x daily - 4 x weekly - 2-3 sets - 10 reps  - Standing Isometric Hip Abduction with Knee at 90 at Wall  - 1 x daily - 4 x weekly - 2 sets - 10 reps - 5 seconds hold